# Patient Record
Sex: FEMALE | Race: WHITE
[De-identification: names, ages, dates, MRNs, and addresses within clinical notes are randomized per-mention and may not be internally consistent; named-entity substitution may affect disease eponyms.]

---

## 2017-02-12 ENCOUNTER — HOSPITAL ENCOUNTER (OUTPATIENT)
Dept: HOSPITAL 17 - NEPE | Age: 37
Setting detail: OBSERVATION
LOS: 1 days | Discharge: HOME | End: 2017-02-13
Attending: HOSPITALIST | Admitting: HOSPITALIST
Payer: COMMERCIAL

## 2017-02-12 VITALS
RESPIRATION RATE: 20 BRPM | HEART RATE: 95 BPM | DIASTOLIC BLOOD PRESSURE: 80 MMHG | SYSTOLIC BLOOD PRESSURE: 121 MMHG | TEMPERATURE: 98.8 F | OXYGEN SATURATION: 98 %

## 2017-02-12 VITALS — HEIGHT: 71 IN | WEIGHT: 149.91 LBS | BODY MASS INDEX: 20.99 KG/M2

## 2017-02-12 DIAGNOSIS — G43.909: ICD-10-CM

## 2017-02-12 DIAGNOSIS — K59.09: ICD-10-CM

## 2017-02-12 DIAGNOSIS — D72.829: ICD-10-CM

## 2017-02-12 DIAGNOSIS — T18.128A: Primary | ICD-10-CM

## 2017-02-12 DIAGNOSIS — K44.9: ICD-10-CM

## 2017-02-12 DIAGNOSIS — K21.9: ICD-10-CM

## 2017-02-12 DIAGNOSIS — K29.70: ICD-10-CM

## 2017-02-12 PROCEDURE — 88312 SPECIAL STAINS GROUP 1: CPT

## 2017-02-12 PROCEDURE — 43239 EGD BIOPSY SINGLE/MULTIPLE: CPT

## 2017-02-12 PROCEDURE — G0378 HOSPITAL OBSERVATION PER HR: HCPCS

## 2017-02-12 PROCEDURE — 83690 ASSAY OF LIPASE: CPT

## 2017-02-12 PROCEDURE — 43248 EGD GUIDE WIRE INSERTION: CPT

## 2017-02-12 PROCEDURE — 00740: CPT

## 2017-02-12 PROCEDURE — 96375 TX/PRO/DX INJ NEW DRUG ADDON: CPT

## 2017-02-12 PROCEDURE — 85025 COMPLETE CBC W/AUTO DIFF WBC: CPT

## 2017-02-12 PROCEDURE — 88305 TISSUE EXAM BY PATHOLOGIST: CPT

## 2017-02-12 PROCEDURE — 84703 CHORIONIC GONADOTROPIN ASSAY: CPT

## 2017-02-12 PROCEDURE — C9113 INJ PANTOPRAZOLE SODIUM, VIA: HCPCS

## 2017-02-12 PROCEDURE — 93005 ELECTROCARDIOGRAM TRACING: CPT

## 2017-02-12 PROCEDURE — 96374 THER/PROPH/DIAG INJ IV PUSH: CPT

## 2017-02-12 PROCEDURE — C1769 GUIDE WIRE: HCPCS

## 2017-02-12 PROCEDURE — 80053 COMPREHEN METABOLIC PANEL: CPT

## 2017-02-12 PROCEDURE — 99285 EMERGENCY DEPT VISIT HI MDM: CPT

## 2017-02-12 PROCEDURE — 71010: CPT

## 2017-02-12 NOTE — PD
HPI


Chief Complaint:  Foreign Body


Time Seen by Provider:  23:01


Travel History


International Travel<30 days:  No


Contact w/Intl Traveler<30days:  No


Traveled to known affect area:  No





History of Present Illness


HPI


The patient is a 37 year old female who presents to the [facility] 

emergency department with a history of reportedly having difficulty swallowing 

some starchy foods and meat over the last year.  She reports that she has had 

associated heartburn and reflux.  She reports that she has tried Prevacid 

without any relief.  She reports that over the last month her symptoms have 

greatly worsened.  She reports that today she went to [employer] and ate a sample 

of steak and asparagus and immediately began to choke on it.  She reports that 

she did not cough, however it felt like the food was stuck in her throat.  The 

patient reports that since then she has not been able to drink any fluids.  She 

reports that she cannot even swallow her own saliva.  She reports having 

central burning sensation in her chest.  The patient denies having any blood in 

her stool or black or tarry stools.  She reports that she last moved her bowels 

on Friday.  She reports having problems with chronic constipation.  She reports 

that she occasionally takes MiraLAX or senna.  The patient reports that she has 

a history of migraine headaches and usually will have a headache 3-4 times per 

week.  She reports that she takes BC powder 3-4 times per week for this.  She 

denies taking any other over-the-counter pain medication.  She reports that she 

drinks alcohol, usually 2 glasses of wine weekend.  The patient denies having a 

primary care physician.  She denies ever having endoscopy. The patient denies 

any recent fevers, cough, congestion, neck pain, shortness of breath, abdominal 

pain, vomiting, diarrhea, urinary symptoms, or neurologic symptoms. 


LMP:





[facility]


Past Medical History


*** Narrative Medical


The patient's past medical history is significant for ovarian cysts, history of

  headaches.


Medical History:  Denies Significant Hx


Diminished Hearing:  No


Pregnant?:  Not Pregnant


:  1


Miscarriage:  1


Ovarian Cysts:  Yes (CYSTS REMOVED )





Past Surgical History


*** Narrative Surgical


The patient's past surgical history is significant for 2 ovarian cysts being 

resected laparoscopically.


Gynecologic Surgery:  Yes (OVARIAN CYST   )





Social History


Alcohol Use:  Yes (WEEKENDS)


Tobacco Use:  No


Substance Use:  No (DENIES)





Allergies-Medications


(Allergen,Severity, Reaction):  


Coded Allergies:  


     No Known Allergies (Verified , 17)


Reported Meds & Prescriptions





Reported Meds & Active Scripts


Active


Pantoprazole (Pantoprazole Sodium) 40 Mg Tab 40 Mg PO DAILY 30 Days


Reported


Seasonique (Levonorgestrel-Ethinyl Estradiol) 0.15-0.03-0.01 Mg Tab 1 Tab PO 

DAILY





Narrative Medication


She reports taking BC powders for headaches 3-4 times per week.





Review of Systems


Except as stated in HPI:  all other systems reviewed are Neg


General / Constitutional:  No: Fever


Eyes:  No: Visual changes


HENT:  No: Headaches


Cardiovascular:  Positive: Chest Pain or Discomfort


Respiratory:  No: Cough, Shortness of Breath


Gastrointestinal:  Positive: Nausea, Constipation, Indigestion,  No: Vomiting, 

Diarrhea, Abdominal Pain, Changes in Bowel Habits, Loss of Appetite


Genitourinary:  No: Dysuria


Musculoskeletal:  No: Pain


Skin:  No Rash


Neurologic:  No: Weakness


Psychiatric:  No: Depression


Endocrine:  No: Polydipsia


Hematologic/Lymphatic:  No: Easy Bruising





Physical Exam


Narrative


General: 


The patient is well-developed well-nourished female who is uncomfortable 

appearing on examination, intermittently spitting saliva into a cup.





Head and Neck exam: 


Head is normocephalic atraumatic. 


Eyes: EOMI, pupils are equal round and reactive to light. 


Nose: Midline septum with pink mucous membranes 


Mouth: Dentition unremarkable. Moist mucus membranes. Posterior oropharynx is 

not erythematous. No tonsillar hypertrophy. Uvula midline. Airway patent. 


Neck: No palpable lymphadenopathy. No nuchal rigidity. No thyromegaly. 





Cardiovascular: 


Regular rate and rhythm without murmurs, gallops, or rubs. 





Lungs: 


Clear to auscultation bilaterally. No wheezes, rhonchi, or rales.


 


Abdomen:


Soft, without tenderness to palpation in all 4 quadrants of the abdomen. No 

guarding, rebound, or rigidity.  Normal bowel sounds are audible.  No 

tenderness on palpation of McBurney's point.





Extremities: 


No clubbing, cyanosis, or edema. 2+ pulses in all 4 extremities.  No calf 

tenderness on palpation.





Back: 


No spinous process tenderness to palpation. No costovertebral angle tenderness 

to palpation. 





Neurologic Exam: Grossly nonfocal.  Skin Exam: No rash noted. Intact skin that 

is warm and dry.





Data


Data


Last Documented VS





Vital Signs








  Date Time  Temp Pulse Resp B/P Pulse Ox O2 Delivery O2 Flow Rate FiO2


 


17 22:42 98.8 95 20 121/80 98 Room Air  








Orders





 Chest, Single Ap (17 23:13)


Iv Access Insert/Monitor (17 23:13)


Ecg Monitoring (17 23:13)


Oximetry (17 23:13)


Sodium Chlor 0.9% 1000 Ml Inj (Ns 1000 M (17 23:15)


Glucagon Inj (Glucagon Inj) (17 23:15)


Electrocardiogram (17 23:29)


Complete Blood Count With Diff (17 23:29)


Comprehensive Metabolic Panel (17 23:29)


Lipase (17 23:29)


Ed Urine Pregnancytest Poc (17 23:29)


Pantoprazole Inj (Protonix Inj) (17 23:30)


Admit Order (Ed Use Only) (17 01:01)





Labs





 Laboratory Tests








Test 17





 23:40


 


White Blood Count 14.5 TH/MM3


 


Red Blood Count 3.79 MIL/MM3


 


Hemoglobin 11.2 GM/DL


 


Hematocrit 32.5 %


 


Mean Corpuscular Volume 85.8 FL


 


Mean Corpuscular Hemoglobin 29.4 PG


 


Mean Corpuscular Hemoglobin 34.3 %





Concent 


 


Red Cell Distribution Width 13.3 %


 


Platelet Count 555 TH/MM3


 


Mean Platelet Volume 7.7 FL


 


Neutrophils (%) (Auto) 75.9 %


 


Lymphocytes (%) (Auto) 13.0 %


 


Monocytes (%) (Auto) 6.9 %


 


Eosinophils (%) (Auto) 3.7 %


 


Basophils (%) (Auto) 0.5 %


 


Neutrophils # (Auto) 11.0 TH/MM3


 


Lymphocytes # (Auto) 1.9 TH/MM3


 


Monocytes # (Auto) 1.0 TH/MM3


 


Eosinophils # (Auto) 0.5 TH/MM3


 


Basophils # (Auto) 0.1 TH/MM3


 


CBC Comment DIFF FINAL 


 


Differential Comment  


 


Sodium Level 141 MEQ/L


 


Potassium Level 3.7 MEQ/L


 


Chloride Level 106 MEQ/L


 


Carbon Dioxide Level 24.5 MEQ/L


 


Anion Gap 11 MEQ/L


 


Blood Urea Nitrogen 14 MG/DL


 


Creatinine 0.72 MG/DL


 


Estimat Glomerular Filtration 91 ML/MIN





Rate 


 


Random Glucose 82 MG/DL


 


Calcium Level 8.7 MG/DL


 


Total Bilirubin 0.2 MG/DL


 


Aspartate Amino Transf 10 U/L





(AST/SGOT) 


 


Alanine Aminotransferase 15 U/L





(ALT/SGPT) 


 


Alkaline Phosphatase 92 U/L


 


Total Protein 8.1 GM/DL


 


Albumin 3.3 GM/DL


 


Lipase 81 U/L











MDM


Medical Decision Making


Medical Screen Exam Complete:  Yes


Emergency Medical Condition:  Yes


Medical Record Reviewed:  Yes


Interpretation(s)





Laboratory Tests








Test 17





 23:40


 


White Blood Count 14.5 TH/MM3





 (4.0-11.0)


 


Red Blood Count 3.79 MIL/MM3





 (4.00-5.30)


 


Hemoglobin 11.2 GM/DL





 (11.6-15.3)


 


Hematocrit 32.5 %





 (35.0-46.0)


 


Platelet Count 555 TH/MM3





 (150-450)


 


Neutrophils (%) (Auto) 75.9 %





 (16.0-70.0)


 


Neutrophils # (Auto) 11.0 TH/MM3





 (1.8-7.7)


 


Monocytes # (Auto) 1.0 TH/MM3





 (0-0.9)


 


Eosinophils # (Auto) 0.5 TH/MM3





 (0-0.4)


 


Aspartate Amino Transf 10 U/L (15-37)





(AST/SGOT) 


 


Albumin 3.3 GM/DL





 (3.4-5.0)








Last Impressions








Chest X-Ray 17 [id number] Signed





Impressions: 





 Service Date/Time:  2017 23:21 - CONCLUSION:  1. No 

foreign 





 body is identified. 2. Possible nodule right base     Salazar You MD 








Differential Diagnosis


Acid reflux with stricture formation, versus esophageal food bolus


Narrative Course


During the course of the patients emergency department visit, the patients 

history, examination, and differential diagnosis were reviewed with the 

patient. The patient had  IV access obtained and blood work sent for analysis.  

The patient was placed on a cardiac monitor with oximetry and blood pressure 

monitoring.  An EKG has been ordered.  A chest x-ray has been ordered.





The patient was provided Protonix 40 mg IV, normal saline 1 L IV fluid bolus, 

glucagon 0.5 mg IV.





The patients laboratory studies were reviewed and remarkable for a white count 

of 14.5, hemoglobin 11.2, platelets 555, neutrophils 75.9, CMP is unremarkable.

  Lipase 81





 Radiology studies were reviewed and remarkable for a chest x-ray that shows no 

foreign body, possible nodule right lung base.





The patient was reexamined and reported some improvement after the glucagon, 

however she was given a trial of by mouth hydration and again was not able to 

tolerate by mouth and vomited and continued to spit out her saliva which she 

was not able to swallow.  A call was placed out to the gastroenterologist on 

call.  He reports that he will not be able to do her endoscopy until the 

morning.  The patient will be admitted for observation for pain control, IV 

fluids.  





The patients results were discussed with the patient, including the plan of 

care. I explained that further testing and/ or monitoring is indicated based on 

the patients history, examination, and/ or laboratory findings. Therefore, I 

recommended admission for additional evaluation. The patient expressed 

understanding and was agreeable with this plan. The patient was admitted to the 

hospital in stable condition and [facility]





Physician Communication


Physician Communication


I spoke to Dr. Long at 12:45 AM.  He requested that the patient be observed 

and nothing by mouth as he plans to take the patient for endoscopy in the 

morning.  A [facility] was then [facility] 

[facility].  The patient's case is discussed with Dr. Travis who did 

agree to admit the patient for further evaluation and treatment at this time.





Diagnosis





 Primary Impression:  


 Intractable vomiting


 Qualified Code:  R11.11 - Intractable vomiting without nausea, unspecified 

vomiting type


 Additional Impression:  


 Sensation of foreign body in esophagus





Admitting Information


Admitting Physician Requests:  Observation


Scripts


Pantoprazole 40 Mg Tab40 Mg PO DAILY  30 Days


   Prov:Estela Dyer DO         17








Neva Rowland MD 2017 23:37

## 2017-02-13 VITALS — TEMPERATURE: 98.4 F

## 2017-02-13 VITALS
OXYGEN SATURATION: 99 % | TEMPERATURE: 97.9 F | HEART RATE: 89 BPM | RESPIRATION RATE: 18 BRPM | DIASTOLIC BLOOD PRESSURE: 68 MMHG | SYSTOLIC BLOOD PRESSURE: 104 MMHG

## 2017-02-13 VITALS
OXYGEN SATURATION: 96 % | HEART RATE: 84 BPM | SYSTOLIC BLOOD PRESSURE: 113 MMHG | RESPIRATION RATE: 16 BRPM | DIASTOLIC BLOOD PRESSURE: 2 MMHG

## 2017-02-13 VITALS
RESPIRATION RATE: 18 BRPM | HEART RATE: 83 BPM | OXYGEN SATURATION: 98 % | DIASTOLIC BLOOD PRESSURE: 55 MMHG | TEMPERATURE: 99 F | SYSTOLIC BLOOD PRESSURE: 110 MMHG

## 2017-02-13 VITALS
OXYGEN SATURATION: 99 % | DIASTOLIC BLOOD PRESSURE: 68 MMHG | HEART RATE: 89 BPM | TEMPERATURE: 97.9 F | SYSTOLIC BLOOD PRESSURE: 104 MMHG | RESPIRATION RATE: 18 BRPM

## 2017-02-13 LAB
ALP SERPL-CCNC: 92 U/L (ref 45–117)
ALT SERPL-CCNC: 15 U/L (ref 10–53)
ANION GAP SERPL CALC-SCNC: 11 MEQ/L (ref 5–15)
AST SERPL-CCNC: 10 U/L (ref 15–37)
BASOPHILS # BLD AUTO: 0.1 TH/MM3 (ref 0–0.2)
BASOPHILS NFR BLD: 0.5 % (ref 0–2)
BILIRUB SERPL-MCNC: 0.2 MG/DL (ref 0.2–1)
BUN SERPL-MCNC: 14 MG/DL (ref 7–18)
CHLORIDE SERPL-SCNC: 106 MEQ/L (ref 98–107)
EOSINOPHIL # BLD: 0.5 TH/MM3 (ref 0–0.4)
EOSINOPHIL NFR BLD: 3.7 % (ref 0–4)
ERYTHROCYTE [DISTWIDTH] IN BLOOD BY AUTOMATED COUNT: 13.3 % (ref 11.6–17.2)
GFR SERPLBLD BASED ON 1.73 SQ M-ARVRAT: 91 ML/MIN (ref 89–?)
HCO3 BLD-SCNC: 24.5 MEQ/L (ref 21–32)
HCT VFR BLD CALC: 32.5 % (ref 35–46)
HEMO FLAGS: (no result)
LYMPHOCYTES # BLD AUTO: 1.9 TH/MM3 (ref 1–4.8)
LYMPHOCYTES NFR BLD AUTO: 13 % (ref 9–44)
MCH RBC QN AUTO: 29.4 PG (ref 27–34)
MCHC RBC AUTO-ENTMCNC: 34.3 % (ref 32–36)
MCV RBC AUTO: 85.8 FL (ref 80–100)
MONOCYTES NFR BLD: 6.9 % (ref 0–8)
NEUTROPHILS # BLD AUTO: 11 TH/MM3 (ref 1.8–7.7)
NEUTROPHILS NFR BLD AUTO: 75.9 % (ref 16–70)
PLATELET # BLD: 555 TH/MM3 (ref 150–450)
POTASSIUM SERPL-SCNC: 3.7 MEQ/L (ref 3.5–5.1)
RBC # BLD AUTO: 3.79 MIL/MM3 (ref 4–5.3)
SODIUM SERPL-SCNC: 141 MEQ/L (ref 136–145)
WBC # BLD AUTO: 14.5 TH/MM3 (ref 4–11)

## 2017-02-13 RX ADMIN — MORPHINE SULFATE PRN MG: 2 INJECTION, SOLUTION INTRAMUSCULAR; INTRAVENOUS at 09:50

## 2017-02-13 RX ADMIN — MORPHINE SULFATE PRN MG: 2 INJECTION, SOLUTION INTRAMUSCULAR; INTRAVENOUS at 03:16

## 2017-02-13 RX ADMIN — MORPHINE SULFATE PRN MG: 2 INJECTION, SOLUTION INTRAMUSCULAR; INTRAVENOUS at 06:24

## 2017-02-13 RX ADMIN — MORPHINE SULFATE PRN MG: 2 INJECTION, SOLUTION INTRAMUSCULAR; INTRAVENOUS at 13:21

## 2017-02-13 NOTE — RADRPT
EXAM DATE/TIME:  02/12/2017 23:21 

 

[facility] COMPARISON:     

No previous studies available for comparison.

 

                     

INDICATIONS :     

Foreign body.

                     

 

MEDICAL HISTORY :     

None.          

 

SURGICAL HISTORY :     

None.   

 

ENCOUNTER:     

Initial                                        

 

ACUITY:     

1 day      

 

PAIN SCORE:     

0/10

 

LOCATION:     

Bilateral chest 

 

FINDINGS:     

The cardiac silhouette is normal in transverse diameter. There is no  evidence of pneumonia. No forei
gn body is identified. There is a questionable nodule in the right base. The maximal diameter of this
 finding is 15 mm. CT scan is recommended for further evaluation if clinically indicated. This can be
 performed on an elective basis.

 

CONCLUSION:     

1. No foreign body is identified.

2. Possible nodule right base

 

 

 

 Salazar You MD on February 12, 2017 at 23:59           

Board Certified Radiologist.

 This report was verified electronically.

## 2017-02-13 NOTE — EKG
Date Performed: 02/12/2017       Time Performed: 23:49:56

 

PTAGE:      37 years

 

EKG:      Sinus rhythm 

 

 NORMAL ECG 

 

NO PREVIOUS TRACING            

 

DOCTOR:   Salazar Raza  Interpretating Date/Time  02/13/2017 09:36:23

## 2017-02-13 NOTE — HHI.PR
Subjective


Remarks


Follow up for food impaction. Ms. Lopez underwent EGD study today. She is 

complaining of some discomfort but able to eat small amount. No fever, chills. 

GI cleared for discharge.





Objective


Vitals





 Vital Signs








  Date Time  Temp Pulse Resp B/P Pulse Ox O2 Delivery O2 Flow Rate FiO2


 


2/13/17 11:45  84 16 113/2 96 Room Air  


 


2/13/17 11:35 98.4 98 16 117/75 97 Room Air  


 


2/13/17 09:35 97.9 89 18 104/68 99   


 


2/13/17 08:00 99.0 83 18 110/55 98   


 


2/13/17 03:06 97.9 89 18 104/68 99   


 


2/12/17 22:42 98.8 95 20 121/80 98 Room Air  








 I/O








 2/12/17 2/12/17 2/12/17 2/13/17 2/13/17 2/13/17





 07:00 15:00 23:00 07:00 15:00 23:00


 


Intake Total    300 ml 10 ml 


 


Balance    300 ml 10 ml 


 


      


 


Intake IV Total    300 ml 10 ml 








Result Diagram:  


2/12/17 2340                                                                   

             2/12/17 2340





Imaging





Last Impressions








Chest X-Ray 2/12/17 2313 Signed





Impressions: 





 Service Date/Time:  Sunday, February 12, 2017 23:21 - CONCLUSION:  1. No 

foreign 





 body is identified. 2. Possible nodule right base     Salazar You MD 








Objective Remarks


GENERAL:  AOX3, NAD. 


SKIN: Warm and dry.


HEAD: Normocephalic.


EYES: No scleral icterus. No injection or drainage. 


NECK: Supple, trachea midline. No JVD or lymphadenopathy.


CARDIOVASCULAR: Regular rate and rhythm without murmurs, gallops, or rubs. 


RESPIRATORY: Breath sounds equal bilaterally. No accessory muscle use.


GASTROINTESTINAL: Abdomen soft, non-tender, nondistended. 


MUSCULOSKELETAL: No cyanosis, or edema. 


BACK: Nontender without obvious deformity. No CVA tenderness.





Procedures


EGD 2/13/2017.





A/P


Problem List:  


(1) Food impaction of esophagus


ICD Code:  T18.128A


Status:  Acute


(2) Intractable vomiting


ICD Code:  R11.10


Status:  Acute


(3) Leukocytosis


ICD Code:  D72.829


Status:  Acute


Assessment and Plan


Ms. Lopez is a 37-year-old female with a PMH of Migraines who presented to 

the ER with complaints of sensation of foreign body in esophagus after eating.  

EGD was done with dilation and disimpaction. 





- Esophageal food impaction


   - s/p EGD.    


   - GI recommends PPI, avoid NSAIDs. 


   - Retroflexed hiatal hernia noted as well as gastritis with superficial 

ulceration antrum. 





Full code. Ambulation. 





Discharge patient to home


Condition on discharge: Improved


Regular Diet as tolerated


Ad Cherrie activity


Rx written:


- Protonix 40mg Qday. 





Follow-up with GI within two weeks.








Estela Dyer DO Feb 13, 2017 2:45 pm

## 2017-02-13 NOTE — PD.CONS
HPI


History of Present Illness


This is a 37 year old female who came to the ER for evaluation after getting a 

piece of meat caught in her esophagus.  She reports that she has been having 

issues with dry solids occasionally getting caught in her mid esophagus for 

about a year.  This seems to have been gradually worsening, although it 

suddenly became much worse about a week ago.  She was at [employer] and had a 

sample of steak and asparagus and reports that it became lodged and that she 

could not get this to go down and that she could not even pass any water.  She 

denies any heartburn or reflux prior to this, but did have some after the food 

became lodged.  She reports that her appetite has been good up until this point 

and she has not had any other GI issues.  Of note, she does get migraines and 

she takes BC ASA about 3-4 times a week.   (Mary Grace Odom)





PFSH


Past Medical History


Migraines


Past Surgical History


Surgery for Ovarian Cyst


LEEP procedure (Mary Grace Odom)


Coded Allergies:  


     No Known Allergies (Verified , 2/12/17)


Medications





 Allergies








Coded Allergies Type Severity Reaction Last Updated Verified


 


  No Known Allergies    2/12/17 Yes








 Active Scripts








 Medications  Dose


 Route/Sig Days Date Category


 


 Seasonique


  (Levonorgestrel-Ethinyl


 Estradiol)


 0.15-0.03-0.01 Mg


 Tab  1 Tab


 PO DAILY   2/13/17 Reported








Family History


Maternal grandmother has DM


Social History


Occasional alcohol.  Negative for tobacco or drugs. (Mary Grace Odom)





Review of Systems


Constitutional:  DENIES: Fatigue, Weight loss


Respiratory:  DENIES: Cough


Cardiovascular:  DENIES: Chest pain


Gastrointestinal:  COMPLAINS OF: Difficulty Swallowing, Odynophagia, Heartburn,

  DENIES: Abdominal pain, Black stools, Bloody stools, Constipation, Diarrhea, 

Nausea, Vomiting, Anorexia


Musculoskeletal:  DENIES: Joint pain


Integumentary:  DENIES: Abnormal pigmentation


Hematologic/lymphatic:  DENIES: Bruising


Neurologic:  COMPLAINS OF: Headache


Psychiatric:  DENIES: Confusion (Mary Grace Odom)





GI Exam


Vitals I&O





 Vital Signs








  Date Time  Temp Pulse Resp B/P Pulse Ox O2 Delivery O2 Flow Rate FiO2


 


2/13/17 11:45  84 16 113/2 96 Room Air  


 


2/13/17 11:35 98.4 98 16 117/75 97 Room Air  


 


2/13/17 09:35 97.9 89 18 104/68 99   


 


2/13/17 08:00 99.0 83 18 110/55 98   


 


2/13/17 03:06 97.9 89 18 104/68 99   


 


2/12/17 22:42 98.8 95 20 121/80 98 Room Air  








 I/O








 2/12/17 2/12/17 2/12/17 2/13/17 2/13/17 2/13/17





 07:00 15:00 23:00 07:00 15:00 23:00


 


Intake Total    300 ml 10 ml 


 


Balance    300 ml 10 ml 


 


      


 


Intake IV Total    300 ml 10 ml 








Imaging





Last Impressions








Chest X-Ray 2/12/17 8613 Signed





Impressions: 





 Service Date/Time:  Sunday, February 12, 2017 23:21 - CONCLUSION:  1. No 

foreign 





 body is identified. 2. Possible nodule right base     Salazar You MD 








Laboratory











Test 2/12/17





 23:40


 


White Blood Count 14.5 TH/MM3


 


Red Blood Count 3.79 MIL/MM3


 


Hemoglobin 11.2 GM/DL


 


Hematocrit 32.5 %


 


Mean Corpuscular Volume 85.8 FL


 


Mean Corpuscular Hemoglobin 29.4 PG


 


Mean Corpuscular Hemoglobin 34.3 %





Concent 


 


Red Cell Distribution Width 13.3 %


 


Platelet Count 555 TH/MM3


 


Mean Platelet Volume 7.7 FL


 


Neutrophils (%) (Auto) 75.9 %


 


Lymphocytes (%) (Auto) 13.0 %


 


Monocytes (%) (Auto) 6.9 %


 


Eosinophils (%) (Auto) 3.7 %


 


Basophils (%) (Auto) 0.5 %


 


Neutrophils # (Auto) 11.0 TH/MM3


 


Lymphocytes # (Auto) 1.9 TH/MM3


 


Monocytes # (Auto) 1.0 TH/MM3


 


Eosinophils # (Auto) 0.5 TH/MM3


 


Basophils # (Auto) 0.1 TH/MM3


 


CBC Comment DIFF FINAL 


 


Differential Comment  


 


Sodium Level 141 MEQ/L


 


Potassium Level 3.7 MEQ/L


 


Chloride Level 106 MEQ/L


 


Carbon Dioxide Level 24.5 MEQ/L


 


Anion Gap 11 MEQ/L


 


Blood Urea Nitrogen 14 MG/DL


 


Creatinine 0.72 MG/DL


 


Estimat Glomerular Filtration 91 ML/MIN





Rate 


 


Random Glucose 82 MG/DL


 


Calcium Level 8.7 MG/DL


 


Total Bilirubin 0.2 MG/DL


 


Aspartate Amino Transf 10 U/L





(AST/SGOT) 


 


Alanine Aminotransferase 15 U/L





(ALT/SGPT) 


 


Alkaline Phosphatase 92 U/L


 


Total Protein 8.1 GM/DL


 


Albumin 3.3 GM/DL


 


Lipase 81 U/L








Physical Examination


HEENT: Normocephalic; atraumatic; no jaundice.  Throat is clear.


NECK: Neck is supple, no JVD, no lymphadenopathy.


CHEST:  CTA


CARDIAC:  Regular rate and rhythm with no murmur gallop or rubs.


ABDOMEN:  Soft, nondistended, nontender; no hepatosplenomegaly; bowel sounds 

are present in all four quadrants.


EXTREMITIES: No clubbing, cyanosis, or edema.


SKIN:  Normal; no rash; no jaundice.


CNS:  No focal deficits; alert and oriented times three. (Mary Grace Odom)





Assessment and Plan


Plan


ASSESSMENT:


- Foreign Body in esophagus, Dysphagia, Odynophagia. Intermittent dysphagia 

with dry foods getting caught x 1 year, worse over the past week.  Had a piece 

of steak and asparagus


   get lodged in esophagus yesterday and was not able to get this down or pass 

liquids.  Of note, she takes BC ASA about 3-4 times a week for migraines.





PLAN:


- Plan for egd +/- dilatation today


- Obtain consents


- NPO


- PPI


- Supportive care


- Further recommendations to follow based on results of above


- Pt seen and examined by Dr. Thompson and myself and this note is written on her 

behalf (Mary Grace Odom)


Physician Comments


seen, examined


agree with above  (Sugey Thompson MD)








Mary Grace Odom Feb 13, 2017 12:29


Sugey Thompson MD Feb 13, 2017 18:47

## 2017-12-27 ENCOUNTER — HOSPITAL ENCOUNTER (EMERGENCY)
Dept: HOSPITAL 17 - NEPD | Age: 37
Discharge: HOME | End: 2017-12-27
Payer: SELF-PAY

## 2017-12-27 VITALS
TEMPERATURE: 97.5 F | DIASTOLIC BLOOD PRESSURE: 89 MMHG | HEART RATE: 99 BPM | RESPIRATION RATE: 18 BRPM | SYSTOLIC BLOOD PRESSURE: 129 MMHG | OXYGEN SATURATION: 100 %

## 2017-12-27 DIAGNOSIS — J18.1: Primary | ICD-10-CM

## 2017-12-27 PROCEDURE — 71010: CPT

## 2017-12-27 PROCEDURE — 99284 EMERGENCY DEPT VISIT MOD MDM: CPT

## 2017-12-27 NOTE — RADRPT
EXAM DATE/TIME:  12/27/2017 12:08 

 

[facility] COMPARISON:     

CHEST SINGLE AP, February 12, 2017, 23:21.

 

                     

INDICATIONS :     

Cough.

                     

 

MEDICAL HISTORY :     

None.          

 

SURGICAL HISTORY :     

None.   

 

ENCOUNTER:     

Initial                                        

 

ACUITY:     

3 months      

 

PAIN SCORE:     

0/10

 

LOCATION:     

Bilateral chest 

 

FINDINGS:     

A single view of the chest demonstrates a mild infiltrate in the right middle lobe. Otherwise, the re
st of lung fields are grossly clear. There are no pleural effusions or pulmonary edema. The heart and
 hilar structures are within normal limits. The bony structures are intact and stable..

 

CONCLUSION:     

Mild infiltrate in the right middle lobe.

 

 

 

 Enrico Russell MD on December 27, 2017 at 12:30           

Board Certified Radiologist.

 This report was verified electronically.

## 2017-12-27 NOTE — PD
HPI


Chief Complaint:  Cold / Flu Symptoms


Time Seen by Provider:  11:24


Travel History


International Travel<30 days:  No


Contact w/Intl Traveler<30days:  No


Traveled to known affect area:  No





History of Present Illness


HPI


37-year-old female presents to the emergency Department with complaint of nasal 

congestion, cough, bilateral ear pain, chest congestion for the last 3-4 weeks 

with worsening the past few days.  Reports subjective fevers and chills.  

Denies throat pain.  Reports shortness of breath, chest tightness, wheezing.  

Denies chest pain.  Report body aches.  Denies vomiting, abdominal pain.  Been 

taking Anitha[place] for symptom management.  If symptoms are mild in severity.  

He does not have an established primary care provider.  Denies significant past 

medical history.  No known allergies.  Has no other medical complaints.  No 

other modifying factors or associated signs and symptoms.





PFSH


Past Medical History


Anxiety:  Yes


Depression:  Yes


Cancer:  No


Cardiovascular Problems:  No


Chemotherapy:  No


Diminished Hearing:  No


Endocrine:  No


Genitourinary:  No


Immune Disorder:  No


Musculoskeletal:  No


Neurologic:  No


Psychiatric:  Yes


Reproductive:  No


Respiratory:  No


Radiation Therapy:  No


Pregnant?:  Not Pregnant


:  1


Miscarriage:  1


Ovarian Cysts:  Yes (CYSTS REMOVED )





Past Surgical History


Gynecologic Surgery:  Yes (OVARIAN CYST   )





Social History


Alcohol Use:  Yes (WEEKENDS)


Tobacco Use:  No


Substance Use:  No (DENIES)





Allergies-Medications


(Allergen,Severity, Reaction):  


Coded Allergies:  


     No Known Allergies (Verified  Adverse Reaction, Unknown, 17)


Reported Meds & Prescriptions





Reported Meds & Active Scripts


Active


Deltasone (Prednisone) 20 Mg Tab 20 Mg PO BID 5 Days


Ventolin Hfa 18 GM Inh (Albuterol Sulfate) 90 Mcg/Act Aer 2 Puff INH Q4-6H PRN


Tessalon Perles (Benzonatate) 100 Mg Cap 100 Mg PO TID PRN 3 Days


Azithromycin 500 Mg Tab 500 Mg PO DAILY 5 Days


Reported


Prevacid (Lansoprazole) 15 Mg Capdr 15 Mg PO DAILY








Review of Systems


Except as stated in HPI:  all other systems reviewed are Neg





Physical Exam


Narrative


GENERAL: Well-nourished, well-developed  female patient, in no acute 

distress; afebrile, nontoxic-appearing


SKIN: Warm and dry.


HEAD: Atraumatic. Normocephalic. 


EYES: Pupils equal and round. No scleral icterus. No injection or drainage. 


ENT: Mucosa pink and moist. No erythema or exudates. No uvular edema. No uvular

, palatal, or tonsillar deviation.  Airway patent.  Nares without nasal blood, 

purulent drainage.


EARS: Bilateral pinnae and external canals appear within normal limits. 

Bilateral tympanic membranes without erythema, dullness or perforation.


NECK: Trachea midline.  No lymphadenopathy. 


CARDIOVASCULAR: Regular rate and rhythm.  No murmur appreciated.


RESPIRATORY: No accessory muscle use.  Lungs with mild Wheezing in bilateral 

bases to auscultation. Breath sounds equal bilaterally.  No retractions or 

tachypnea.  No Audible wheezing noted.  Persistent dry cough.


GASTROINTESTINAL: Abdomen soft, non-tender, nondistended. Hepatic and splenic 

margins not palpable.  Bowel sounds are active 4 quadrants.  


MUSCULOSKELETAL: No obvious deformities. No clubbing.  No cyanosis.  No edema. 


NEUROLOGICAL: Awake and alert.  Oriented 3.  No obvious cranial nerve 

deficits.  Motor grossly within normal limits. Normal speech. Moves all 

extremities.  5/5 strength to all extremities.


PSYCHIATRIC: Appropriate mood and affect; insight and judgment normal.





Data


Data


Last Documented VS





Vital Signs








  Date Time  Temp Pulse Resp B/P (MAP) Pulse Ox O2 Delivery O2 Flow Rate FiO2


 


17 11:30      Room Air  


 


17 11:13 97.5 99 18 129/89 (102) 100   








Orders





 Orders


Chest, Single Ap (17 11:32)


Prednisone (Deltasone) (17 11:45)


Albuterol Neb (Albuterol Neb) (17 11:45)


Influenzae A/B Antigen (17 11:37)


Ed Discharge Order (17 12:41)








[facility]


Medical Decision Making


Medical Screen Exam Complete:  Yes


Emergency Medical Condition:  Yes


Medical Record Reviewed:  Yes


Differential Diagnosis


Bronchitis, influenza, pneumonia, viral illness


Narrative Course


37-year-old female with cough/cold/flu symptoms.  Patient is afebrile and 

nontoxic-appearing.  Reports subjective fevers.  Patient with wheezing in 

bilateral bases.  No acute distress.  No retractions or tachypnea.  Persistent 

dry cough.  Prednisone, albuterol nebulizer, chest x-ray, influenza ordered.


1235:  Chest x-ray concludes:  Mild infiltrate in the right middle lobe.  On 

reexamination the patient reports improvement in chest tightness and shortness 

of breath.  Lung sounds are clear and equal throughout.  Azithromycin, Ventolin 

inhaler, prednisone, Tessalon Perles prescribed for home.  Instructed patient 

to follow up with primary care provider.  Patient verbalizes understanding and 

agreement with treatment plan.  Patient is medically cleared and stable for 

discharge.  Discussed reasons to return to the emergency department.  Patient 

agrees with treatment plan.  The patients vital signs are stable and the 

patient is stable for outpatient follow-up and treatment.  Patient discharged 

home, stable and in no acute distress.





Diagnosis





 Primary Impression:  


 Pneumonia


 Qualified Codes:  J18.1 - Lobar pneumonia, unspecified organism


Referrals:  


[facility]





Primary Care Physician


Patient Instructions:  Community Acquired Pneumonia (ED), General Instructions, 

Safe Use of Cough and Cold Medicines (ED)


Departure Forms:  Tests/Procedures, Work Release   Enter return to work date:  

Dec 29, 2017





***Additional Instructions:  


Ibuprofen or Tylenol as directed and as needed to reduce fever; may alternate 

ibuprofen and Tylenol as needed every 3 hours to minimize fever


Over-the-counter cold/flu medications as directed and as needed for symptom 

management


Get plenty of sleep/rest


Drink plenty of fluids to prevent dehydration; such as Gatorade, Powerade, 

Pedialyte


[place] diet to encourage nutrition such as crackers, fruit, applesauce, toast, 

soup etc.


Use an air humidifier/turn off ceiling fans


Use albuterol inhaler as needed for shortness of breath and/or wheezing


Take oral steroids as prescribed and complete full course


Follow-up with your primary care provider within 1 day; follow up for repeat 

chest x-ray in 7-10 days to verify resolution


Return immediately to the emergency department with worsening of symptoms


***Med/Other Pt SpecificInfo:  Prescription(s) given


Scripts


Prednisone (Deltasone) 20 Mg Tab


20 MG PO BID for 5 Days, #10 TAB 0 Refills


   Prov: Maryann Regalado ARNP         17 


Albuterol 18 GM Inh (Ventolin Hfa 18 GM Inh) 90 Mcg/Act Aer


2 PUFF INH Q4-6H Y for SOB/WHEEZING, #1 INHALER 0 Refills


   Prov: Maryann Regalado ARNP         17 


Benzonatate (Tessalon Perles) 100 Mg Cap


100 MG PO TID Y for COUGH for 3 Days, CAP 0 Refills


   Prov: Maryann Regalado         17 


Azithromycin (Azithromycin) 500 Mg Tab


500 MG PO DAILY for Infection for 5 Days, #5 TAB 0 Refills


   Prov: Maryann Regalado         17


Disposition:  01 DISCHARGE HOME


Condition:  Stable











Maryann Regalado Dec 27, 2017 11:37

## 2018-03-07 ENCOUNTER — HOSPITAL ENCOUNTER (OUTPATIENT)
Dept: HOSPITAL 17 - HRAD | Age: 38
Discharge: HOME | End: 2018-03-07
Attending: FAMILY MEDICINE
Payer: COMMERCIAL

## 2018-03-07 ENCOUNTER — HOSPITAL ENCOUNTER (INPATIENT)
Dept: HOSPITAL 17 - NEPE | Age: 38
LOS: 12 days | Discharge: HOME | DRG: 842 | End: 2018-03-19
Attending: HOSPITALIST | Admitting: HOSPITALIST
Payer: COMMERCIAL

## 2018-03-07 VITALS
SYSTOLIC BLOOD PRESSURE: 91 MMHG | RESPIRATION RATE: 16 BRPM | OXYGEN SATURATION: 97 % | DIASTOLIC BLOOD PRESSURE: 54 MMHG | TEMPERATURE: 98.5 F | HEART RATE: 94 BPM

## 2018-03-07 VITALS
RESPIRATION RATE: 18 BRPM | TEMPERATURE: 98.2 F | HEART RATE: 103 BPM | SYSTOLIC BLOOD PRESSURE: 97 MMHG | OXYGEN SATURATION: 97 % | DIASTOLIC BLOOD PRESSURE: 52 MMHG

## 2018-03-07 VITALS
OXYGEN SATURATION: 97 % | DIASTOLIC BLOOD PRESSURE: 56 MMHG | HEART RATE: 107 BPM | SYSTOLIC BLOOD PRESSURE: 92 MMHG | RESPIRATION RATE: 17 BRPM

## 2018-03-07 VITALS
RESPIRATION RATE: 20 BRPM | HEART RATE: 86 BPM | DIASTOLIC BLOOD PRESSURE: 46 MMHG | OXYGEN SATURATION: 99 % | SYSTOLIC BLOOD PRESSURE: 78 MMHG | TEMPERATURE: 97.8 F

## 2018-03-07 VITALS
DIASTOLIC BLOOD PRESSURE: 53 MMHG | SYSTOLIC BLOOD PRESSURE: 116 MMHG | OXYGEN SATURATION: 98 % | RESPIRATION RATE: 22 BRPM | HEART RATE: 82 BPM

## 2018-03-07 VITALS
OXYGEN SATURATION: 100 % | RESPIRATION RATE: 18 BRPM | HEART RATE: 90 BPM | DIASTOLIC BLOOD PRESSURE: 54 MMHG | SYSTOLIC BLOOD PRESSURE: 90 MMHG | TEMPERATURE: 98.2 F

## 2018-03-07 VITALS
RESPIRATION RATE: 16 BRPM | SYSTOLIC BLOOD PRESSURE: 93 MMHG | TEMPERATURE: 97.3 F | OXYGEN SATURATION: 97 % | DIASTOLIC BLOOD PRESSURE: 53 MMHG | HEART RATE: 94 BPM

## 2018-03-07 VITALS
TEMPERATURE: 98.7 F | SYSTOLIC BLOOD PRESSURE: 104 MMHG | DIASTOLIC BLOOD PRESSURE: 62 MMHG | HEART RATE: 100 BPM | RESPIRATION RATE: 16 BRPM | OXYGEN SATURATION: 99 %

## 2018-03-07 VITALS
HEART RATE: 91 BPM | DIASTOLIC BLOOD PRESSURE: 52 MMHG | SYSTOLIC BLOOD PRESSURE: 95 MMHG | OXYGEN SATURATION: 99 % | RESPIRATION RATE: 16 BRPM

## 2018-03-07 VITALS
SYSTOLIC BLOOD PRESSURE: 104 MMHG | OXYGEN SATURATION: 98 % | DIASTOLIC BLOOD PRESSURE: 66 MMHG | HEART RATE: 103 BPM | RESPIRATION RATE: 22 BRPM

## 2018-03-07 VITALS
HEART RATE: 103 BPM | DIASTOLIC BLOOD PRESSURE: 62 MMHG | RESPIRATION RATE: 18 BRPM | OXYGEN SATURATION: 98 % | SYSTOLIC BLOOD PRESSURE: 96 MMHG | TEMPERATURE: 98.8 F

## 2018-03-07 VITALS — OXYGEN SATURATION: 98 %

## 2018-03-07 VITALS — HEIGHT: 71 IN | BODY MASS INDEX: 18.33 KG/M2 | WEIGHT: 130.95 LBS

## 2018-03-07 VITALS
OXYGEN SATURATION: 98 % | SYSTOLIC BLOOD PRESSURE: 83 MMHG | RESPIRATION RATE: 20 BRPM | HEART RATE: 122 BPM | DIASTOLIC BLOOD PRESSURE: 55 MMHG

## 2018-03-07 VITALS
DIASTOLIC BLOOD PRESSURE: 69 MMHG | RESPIRATION RATE: 20 BRPM | HEART RATE: 106 BPM | OXYGEN SATURATION: 98 % | SYSTOLIC BLOOD PRESSURE: 95 MMHG

## 2018-03-07 VITALS — BODY MASS INDEX: 17.96 KG/M2 | WEIGHT: 128.31 LBS | HEIGHT: 71 IN

## 2018-03-07 DIAGNOSIS — K21.0: ICD-10-CM

## 2018-03-07 DIAGNOSIS — Z80.0: ICD-10-CM

## 2018-03-07 DIAGNOSIS — G47.00: ICD-10-CM

## 2018-03-07 DIAGNOSIS — L29.9: ICD-10-CM

## 2018-03-07 DIAGNOSIS — F32.9: ICD-10-CM

## 2018-03-07 DIAGNOSIS — R19.5: ICD-10-CM

## 2018-03-07 DIAGNOSIS — R50.9: ICD-10-CM

## 2018-03-07 DIAGNOSIS — Z87.891: ICD-10-CM

## 2018-03-07 DIAGNOSIS — M54.9: ICD-10-CM

## 2018-03-07 DIAGNOSIS — R59.0: ICD-10-CM

## 2018-03-07 DIAGNOSIS — F06.4: ICD-10-CM

## 2018-03-07 DIAGNOSIS — C81.29: ICD-10-CM

## 2018-03-07 DIAGNOSIS — R16.0: Primary | ICD-10-CM

## 2018-03-07 DIAGNOSIS — R63.4: ICD-10-CM

## 2018-03-07 DIAGNOSIS — R16.0: ICD-10-CM

## 2018-03-07 DIAGNOSIS — R61: ICD-10-CM

## 2018-03-07 DIAGNOSIS — K59.09: ICD-10-CM

## 2018-03-07 DIAGNOSIS — F14.90: ICD-10-CM

## 2018-03-07 DIAGNOSIS — C81.28: Primary | ICD-10-CM

## 2018-03-07 DIAGNOSIS — D50.9: ICD-10-CM

## 2018-03-07 LAB
ALBUMIN SERPL-MCNC: 2.8 GM/DL (ref 3.4–5)
ALP SERPL-CCNC: 220 U/L (ref 45–117)
ALT SERPL-CCNC: 26 U/L (ref 10–53)
AST SERPL-CCNC: 21 U/L (ref 15–37)
BASOPHILS # BLD AUTO: 0 TH/MM3 (ref 0–0.2)
BASOPHILS NFR BLD: 0.3 % (ref 0–2)
BILIRUB SERPL-MCNC: 0.4 MG/DL (ref 0.2–1)
BUN SERPL-MCNC: 9 MG/DL (ref 7–18)
CALCIUM SERPL-MCNC: 8.9 MG/DL (ref 8.5–10.1)
CHLORIDE SERPL-SCNC: 101 MEQ/L (ref 98–107)
COLOR UR: (no result)
CREAT SERPL-MCNC: 0.79 MG/DL (ref 0.5–1)
EOSINOPHIL # BLD: 0.1 TH/MM3 (ref 0–0.4)
EOSINOPHIL NFR BLD: 0.7 % (ref 0–4)
ERYTHROCYTE [DISTWIDTH] IN BLOOD BY AUTOMATED COUNT: 19.1 % (ref 11.6–17.2)
GFR SERPLBLD BASED ON 1.73 SQ M-ARVRAT: 81 ML/MIN (ref 89–?)
GLUCOSE SERPL-MCNC: 67 MG/DL (ref 74–106)
GLUCOSE UR STRIP-MCNC: (no result) MG/DL
HCO3 BLD-SCNC: 27.4 MEQ/L (ref 21–32)
HCT VFR BLD CALC: 22.9 % (ref 35–46)
HGB BLD-MCNC: 7.4 GM/DL (ref 11.6–15.3)
HGB UR QL STRIP: (no result)
INR PPP: 1 RATIO
KETONES UR STRIP-MCNC: (no result) MG/DL
LYMPHOCYTES # BLD AUTO: 0.7 TH/MM3 (ref 1–4.8)
LYMPHOCYTES NFR BLD AUTO: 5.9 % (ref 9–44)
MCH RBC QN AUTO: 24.4 PG (ref 27–34)
MCHC RBC AUTO-ENTMCNC: 32.4 % (ref 32–36)
MCV RBC AUTO: 75.4 FL (ref 80–100)
MONOCYTE #: 0.8 TH/MM3 (ref 0–0.9)
MONOCYTES NFR BLD: 7.2 % (ref 0–8)
NEUTROPHILS # BLD AUTO: 10 TH/MM3 (ref 1.8–7.7)
NEUTROPHILS NFR BLD AUTO: 85.9 % (ref 16–70)
NITRITE UR QL STRIP: (no result)
PLATELET # BLD: 625 TH/MM3 (ref 150–450)
PMV BLD AUTO: 6.7 FL (ref 7–11)
PROT SERPL-MCNC: 8.5 GM/DL (ref 6.4–8.2)
PROTHROMBIN TIME: 10.6 SEC (ref 9.8–11.6)
RBC # BLD AUTO: 3.04 MIL/MM3 (ref 4–5.3)
SODIUM SERPL-SCNC: 137 MEQ/L (ref 136–145)
SP GR UR STRIP: 1 (ref 1–1.03)
URINE LEUKOCYTE ESTERASE: (no result)
WBC # BLD AUTO: 11.6 TH/MM3 (ref 4–11)

## 2018-03-07 PROCEDURE — C1729 CATH, DRAINAGE: HCPCS

## 2018-03-07 PROCEDURE — 88305 TISSUE EXAM BY PATHOLOGIST: CPT

## 2018-03-07 PROCEDURE — 86304 IMMUNOASSAY TUMOR CA 125: CPT

## 2018-03-07 PROCEDURE — 86038 ANTINUCLEAR ANTIBODIES: CPT

## 2018-03-07 PROCEDURE — 82103 ALPHA-1-ANTITRYPSIN TOTAL: CPT

## 2018-03-07 PROCEDURE — 77012 CT SCAN FOR NEEDLE BIOPSY: CPT

## 2018-03-07 PROCEDURE — P9016 RBC LEUKOCYTES REDUCED: HCPCS

## 2018-03-07 PROCEDURE — 84439 ASSAY OF FREE THYROXINE: CPT

## 2018-03-07 PROCEDURE — 86255 FLUORESCENT ANTIBODY SCREEN: CPT

## 2018-03-07 PROCEDURE — 94010 BREATHING CAPACITY TEST: CPT

## 2018-03-07 PROCEDURE — 72157 MRI CHEST SPINE W/O & W/DYE: CPT

## 2018-03-07 PROCEDURE — 73502 X-RAY EXAM HIP UNI 2-3 VIEWS: CPT

## 2018-03-07 PROCEDURE — 94726 PLETHYSMOGRAPHY LUNG VOLUMES: CPT

## 2018-03-07 PROCEDURE — 80048 BASIC METABOLIC PNL TOTAL CA: CPT

## 2018-03-07 PROCEDURE — 85730 THROMBOPLASTIN TIME PARTIAL: CPT

## 2018-03-07 PROCEDURE — 80307 DRUG TEST PRSMV CHEM ANLYZR: CPT

## 2018-03-07 PROCEDURE — 88313 SPECIAL STAINS GROUP 2: CPT

## 2018-03-07 PROCEDURE — C9113 INJ PANTOPRAZOLE SODIUM, VIA: HCPCS

## 2018-03-07 PROCEDURE — 77001 FLUOROGUIDE FOR VEIN DEVICE: CPT

## 2018-03-07 PROCEDURE — 76942 ECHO GUIDE FOR BIOPSY: CPT

## 2018-03-07 PROCEDURE — 88342 IMHCHEM/IMCYTCHM 1ST ANTB: CPT

## 2018-03-07 PROCEDURE — 72197 MRI PELVIS W/O & W/DYE: CPT

## 2018-03-07 PROCEDURE — 86703 HIV-1/HIV-2 1 RESULT ANTBDY: CPT

## 2018-03-07 PROCEDURE — 36561 INSERT TUNNELED CV CATH: CPT

## 2018-03-07 PROCEDURE — 85097 BONE MARROW INTERPRETATION: CPT

## 2018-03-07 PROCEDURE — 96374 THER/PROPH/DIAG INJ IV PUSH: CPT

## 2018-03-07 PROCEDURE — 86301 IMMUNOASSAY TUMOR CA 19-9: CPT

## 2018-03-07 PROCEDURE — 99153 MOD SED SAME PHYS/QHP EA: CPT

## 2018-03-07 PROCEDURE — 88307 TISSUE EXAM BY PATHOLOGIST: CPT

## 2018-03-07 PROCEDURE — 86900 BLOOD TYPING SEROLOGIC ABO: CPT

## 2018-03-07 PROCEDURE — 38222 DX BONE MARROW BX & ASPIR: CPT

## 2018-03-07 PROCEDURE — 30233N1 TRANSFUSION OF NONAUTOLOGOUS RED BLOOD CELLS INTO PERIPHERAL VEIN, PERCUTANEOUS APPROACH: ICD-10-PCS | Performed by: EMERGENCY MEDICINE

## 2018-03-07 PROCEDURE — 93306 TTE W/DOPPLER COMPLETE: CPT

## 2018-03-07 PROCEDURE — 82105 ALPHA-FETOPROTEIN SERUM: CPT

## 2018-03-07 PROCEDURE — 82378 CARCINOEMBRYONIC ANTIGEN: CPT

## 2018-03-07 PROCEDURE — 94729 DIFFUSING CAPACITY: CPT

## 2018-03-07 PROCEDURE — 83020 HEMOGLOBIN ELECTROPHORESIS: CPT

## 2018-03-07 PROCEDURE — 88341 IMHCHEM/IMCYTCHM EA ADD ANTB: CPT

## 2018-03-07 PROCEDURE — 83615 LACTATE (LD) (LDH) ENZYME: CPT

## 2018-03-07 PROCEDURE — 83540 ASSAY OF IRON: CPT

## 2018-03-07 PROCEDURE — 82746 ASSAY OF FOLIC ACID SERUM: CPT

## 2018-03-07 PROCEDURE — 86920 COMPATIBILITY TEST SPIN: CPT

## 2018-03-07 PROCEDURE — 84703 CHORIONIC GONADOTROPIN ASSAY: CPT

## 2018-03-07 PROCEDURE — 84550 ASSAY OF BLOOD/URIC ACID: CPT

## 2018-03-07 PROCEDURE — 84443 ASSAY THYROID STIM HORMONE: CPT

## 2018-03-07 PROCEDURE — 82390 ASSAY OF CERULOPLASMIN: CPT

## 2018-03-07 PROCEDURE — A9579 GAD-BASE MR CONTRAST NOS,1ML: HCPCS

## 2018-03-07 PROCEDURE — C1788 PORT, INDWELLING, IMP: HCPCS

## 2018-03-07 PROCEDURE — 76937 US GUIDE VASCULAR ACCESS: CPT

## 2018-03-07 PROCEDURE — 85610 PROTHROMBIN TIME: CPT

## 2018-03-07 PROCEDURE — 96375 TX/PRO/DX INJ NEW DRUG ADDON: CPT

## 2018-03-07 PROCEDURE — 88311 DECALCIFY TISSUE: CPT

## 2018-03-07 PROCEDURE — 96361 HYDRATE IV INFUSION ADD-ON: CPT

## 2018-03-07 PROCEDURE — 82728 ASSAY OF FERRITIN: CPT

## 2018-03-07 PROCEDURE — 94060 EVALUATION OF WHEEZING: CPT

## 2018-03-07 PROCEDURE — 87040 BLOOD CULTURE FOR BACTERIA: CPT

## 2018-03-07 PROCEDURE — 86850 RBC ANTIBODY SCREEN: CPT

## 2018-03-07 PROCEDURE — 83550 IRON BINDING TEST: CPT

## 2018-03-07 PROCEDURE — 83520 IMMUNOASSAY QUANT NOS NONAB: CPT

## 2018-03-07 PROCEDURE — 85652 RBC SED RATE AUTOMATED: CPT

## 2018-03-07 PROCEDURE — 47000 NEEDLE BIOPSY OF LIVER PERQ: CPT

## 2018-03-07 PROCEDURE — 36430 TRANSFUSION BLD/BLD COMPNT: CPT

## 2018-03-07 PROCEDURE — G0378 HOSPITAL OBSERVATION PER HR: HCPCS

## 2018-03-07 PROCEDURE — 38505 NEEDLE BIOPSY LYMPH NODES: CPT

## 2018-03-07 PROCEDURE — C1830 POWER BONE MARROW BX NEEDLE: HCPCS

## 2018-03-07 PROCEDURE — 86901 BLOOD TYPING SEROLOGIC RH(D): CPT

## 2018-03-07 PROCEDURE — 82607 VITAMIN B-12: CPT

## 2018-03-07 PROCEDURE — 80074 ACUTE HEPATITIS PANEL: CPT

## 2018-03-07 PROCEDURE — 74018 RADEX ABDOMEN 1 VIEW: CPT

## 2018-03-07 PROCEDURE — 85025 COMPLETE CBC W/AUTO DIFF WBC: CPT

## 2018-03-07 PROCEDURE — 96376 TX/PRO/DX INJ SAME DRUG ADON: CPT

## 2018-03-07 PROCEDURE — 72158 MRI LUMBAR SPINE W/O & W/DYE: CPT

## 2018-03-07 PROCEDURE — 99152 MOD SED SAME PHYS/QHP 5/>YRS: CPT

## 2018-03-07 PROCEDURE — 80053 COMPREHEN METABOLIC PANEL: CPT

## 2018-03-07 PROCEDURE — 81001 URINALYSIS AUTO W/SCOPE: CPT

## 2018-03-07 NOTE — RADRPT
EXAM DATE/TIME:  03/07/2018 09:22 

 

[facility] COMPARISON:     

No previous studies available for comparison.

 

 

 

INDICATIONS :      

Liver mass.

                     

 

 

 

SEDATION TIME:       

45 minutes

 

 

BIOPSY SITE:       

Left 

                     

 

MEDICATION(S):

1.) 5 mg midazolam (Versed) IV  

2.) 250 mcg fentanyl (Sublimaze) IV  

 

 

DEVICE(S):

1.) 18 gauge Temno core biopsy needle 

                     

 

MEDICAL HISTORY :     

None.   

 

SURGICAL HISTORY :     

None.   

 

ENCOUNTER:     

Initial

 

ACUITY:     

1 day

 

PAIN SCORE:     

0/10

 

LOCATION:     

Left 

                     

A total of three core specimen(s) were obtained and sent to the laboratory for pathologic evaluation.


 

 

PROCEDURE:

1.   CT guided liver biopsy.

2.   Conscious sedation with continuous EKG and oximetry monitoring.

3.   EKG and oximetry remained stable throughout the procedure.

 

Prior to the procedure informed consent was obtained.  Any appropriate prior imaging studies were rev
iewed. 

Using automated exposure control and adjustment of the mA and/or kV according to patient size, radiat
ion dose was kept as low as reasonably achievable to obtain optimal diagnostic quality images.  DICOM
 format image data is available electronically for review and comparison.   

 

 

The site was prepped in a sterile fashion.  Full sterile technique was used, including cap, mask, kaykay
rile gloves and gown and a large sterile sheet.  Hand hygiene and 2% chlorhexidine and/or betadine/al
cohol prep was utilized per protocol for cutaneous antisepsis.  The skin and subcutaneous tissues wer
e infiltrated with local anesthetic solution.

 

With CT guidance the previously identified target was localized. Biopsy was performed using the presc
ribed needle as above.  Adequate hemostasis was obtained with compression at the puncture site.

 

Follow-up CT scan reveals no hemorrhage.

 

The patient tolerated the procedure well and there were no complications. The patient was returned to
 the Radiology Outpatient Unit in stable condition.

 

CONCLUSION:     Uncomplicated CT guided biopsy of a left hepatic mass.

 

 

 

 Michele Vela MD on March 07, 2018 at 10:51           

Board Certified Radiologist.

 This report was verified electronically.

## 2018-03-07 NOTE — HHI.HP
HPI


Service


[facility]


Primary Care Physician


Diana Carbajal M.D.


Admission Diagnosis





symptomatic anemia


Chief Complaint:  


weakness s/p liver bx with anemia


Travel History


International Travel<30 Days:  No


Contact w/Intl Traveler <30 Da:  No


Traveled to Known Affected Are:  No


History of Present Illness





39 YO F presents to the ED for evaluation of multiple medical complaints.  She 

endorses intermittent dizziness. She states that she woke up around 530am and 

had an oral temp of 103.  She states that she also experienced stinging pain in 

the tailbone. No known injury. She complains of stinging pruritus of the back.  

She complains of left hip pain without trauma.  She complains of intermittent 

right shin pain which she states is "in the bone."  She states that she has 

lost 20 pounds since January.  She endorses night sweats for an unknown period 

of time. She endorses 3 week history of nonproductive cough. She denies CP, SOB

, palpitations. She endorses chronic constipation,one bowel movement a week "

for my lifetime."  States her stools have been dark lately.  She denies anorexia

, N/V,abdominal pain, diarrhea. She denies risk of pregnancy, states that LMP 

was " about a month ago."  She is a current smoker. She endorse occasionally 

snorting cocaine. She endorses occasional alcohol use.  She denies history of 

IVDA.  She states that she had CT guided biopsies of a liver mass and a right 

inguinal lymph node this AM, was afebrile at that time. She was discharged from 

the procedure around 3pm today. She complains of right inguinal pain at the 

needle insertion site of the right inguinal region. She states that her PCP 

called her and told her to come to the ED for "low hemoglobin and problems with 

the biopsies."   She is followed by Dr. Carbajal.  As outpatient found to have 

liver abnormality and rt inguinal lyph node abnormality and were biopsied today 

,patient had 1 week ago hgb 8 now 7.5 and syptomatic admit for blood 

transfusion and further work up did have trace positive stools.





Formerly [facility], [facility]





Review of Systems


Constitutional:  COMPLAINS OF: Diaphoretic episodes, Fatigue, Fever, Weight loss

, Change in appetite, Night Sweats


Gastrointestinal:  COMPLAINS OF: Abdominal pain, Black stools, GERD


Neurologic:  COMPLAINS OF: Localized weakness





Past Family Social History


Past Medical History


recent work up for abdominal pain,liver and rt inguinal node bx,hx pneumonia


Past Surgical History


ovarian cyst


Reported Medications





Magnesium Oxide 250 Mg Tab 250 Mg PO DAILY


Ferrous Sulfate 325 Mg (65 Mg Iron) Tablet 325 Mg PO DAILY


Vitamin B-12 (Cyanocobalamin) 500 Mcg Tab 500 Mcg PO DAILY


Calcium Gluconate 45 Mg Calcium (500 Mg) Tab 500 Mg PO DAILY


     1 gram of salt is 93 mg elemental calcium.


Fish Oil + D3 (Fish Oil-Cholecalciferol) 1,200-1,000 Mg-Unit Cap 1 Cap PO DAILY


Probiotic (Saccharomyces Boulardii) 250 Mg Cap 250 Mg PO BID


Xyzal (Levocetirizine Dihydrochloride) 2.5 Mg/5 Ml Solution 2.5 Mg PO DAILY


Meloxicam 7.5 Mg Tab 7.5 Mg PO DAILY


Hydroxyzine HCl 50 Mg Tab 50 Mg PO HS


Ranitidine (Ranitidine HCl) 150 Mg Tab 150 Mg PO DAILY


Allergies:  


Coded Allergies:  


     No Known Allergies (Verified  Adverse Reaction, Unknown, 1/3/18)


Social History


former smoker occ etoh





Physical Exam


Vital Signs





Vital Signs








  Date Time  Temp Pulse Resp B/P (MAP) Pulse Ox O2 Delivery O2 Flow Rate FiO2


 


3/7/18 20:55 98.7 100 16 104/62 99   


 


3/7/18 20:01  91 16 95/52 (66) 99 Room Air  


 


3/7/18 17:30     98   


 


3/7/18 16:36 98.2 90 18 90/54 (66) 100   








Physical Exam


GENERAL: This is a well-nourished, well-developed patient, in no apparent 

distress.


SKIN: No rashes, ecchymoses or lesions. Cool and dry.


HEAD: Atraumatic. Normocephalic. No temporal or scalp tenderness.


EYES: Pupils equal round and reactive. Extraocular motions intact. No scleral 

icterus. No injection or drainage. 


ENT: Nose without bleeding, purulent drainage or septal hematoma. Throat 

without erythema, tonsillar hypertrophy or exudate. Uvula midline. Airway 

patent.


NECK: Trachea midline. No JVD or lymphadenopathy. Supple, nontender, no 

meningeal signs.


CARDIOVASCULAR: Regular rate and rhythm without murmurs, gallops, or rubs. 


RESPIRATORY: Clear to auscultation. Breath sounds equal bilaterally. No wheezes

, rales, or rhonchi.  


GASTROINTESTINAL: Abdomen soft, mild tender, nondistended. No hepato-

splenomegaly, or palpable masses. No guarding.guiac trace positive


MUSCULOSKELETAL: Extremities without clubbing, cyanosis, or edema. No joint 

tenderness, effusion, or edema noted. No calf tenderness. Negative Homans sign 

bilaterally.


NEUROLOGICAL: Awake and alert. Cranial nerves II through XII intact.  Motor and 

sensory grossly within normal limits. Five out of 5 muscle strength in all 

muscle groups.  Normal speech.


Laboratory





Laboratory Tests








Test


  3/7/18


17:25


 


White Blood Count 11.6 


 


Red Blood Count 3.04 


 


Hemoglobin 7.4 


 


Hematocrit 22.9 


 


Mean Corpuscular Volume 75.4 


 


Mean Corpuscular Hemoglobin 24.4 


 


Mean Corpuscular Hemoglobin


Concent 32.4 


 


 


Red Cell Distribution Width 19.1 


 


Platelet Count 625 


 


Mean Platelet Volume 6.7 


 


Neutrophils (%) (Auto) 85.9 


 


Lymphocytes (%) (Auto) 5.9 


 


Monocytes (%) (Auto) 7.2 


 


Eosinophils (%) (Auto) 0.7 


 


Basophils (%) (Auto) 0.3 


 


Neutrophils # (Auto) 10.0 


 


Lymphocytes # (Auto) 0.7 


 


Monocytes # (Auto) 0.8 


 


Eosinophils # (Auto) 0.1 


 


Basophils # (Auto) 0.0 


 


CBC Comment DIFF FINAL 


 


Differential Comment  


 


Prothrombin Time 10.6 


 


Prothromb Time International


Ratio 1.0 


 


 


Activated Partial


Thromboplast Time 45.5 


 


 


Urine Color LIGHT-YELLOW 


 


Urine Turbidity CLEAR 


 


Urine pH 6.0 


 


Urine Specific Gravity 1.001 


 


Urine Protein NEG 


 


Urine Glucose (UA) NEG 


 


Urine Ketones NEG 


 


Urine Occult Blood NEG 


 


Urine Nitrite NEG 


 


Urine Bilirubin NEG 


 


Urine Urobilinogen LESS THAN 2.0 


 


Urine Leukocyte Esterase NEG 


 


Urine WBC LESS THAN 1 


 


Microscopic Urinalysis Comment


  CULT NOT


INDICATED


 


Blood Urea Nitrogen 9 


 


Creatinine 0.79 


 


Random Glucose 67 


 


Total Protein 8.5 


 


Albumin 2.8 


 


Calcium Level 8.9 


 


Alkaline Phosphatase 220 


 


Aspartate Amino Transf


(AST/SGOT) 21 


 


 


Alanine Aminotransferase


(ALT/SGPT) 26 


 


 


Total Bilirubin 0.4 


 


Sodium Level 137 


 


Potassium Level 3.4 


 


Chloride Level 101 


 


Carbon Dioxide Level 27.4 


 


Anion Gap 9 


 


Estimat Glomerular Filtration


Rate 81 


 


 


Ethyl Alcohol Level LESS THAN 3 








Result Diagram:  


3/7/18 1725                                                                    

            3/7/18 1725





Imaging





Last 24 hours Impressions








Hip and Pelvis X-Ray 3/7/18 1854 Signed





Impressions: 





 Service Date/Time:  2018 19:11 - CONCLUSION:  Negative 





 evaluation of the left hip.     Roby Matos MD 








Course


in er had decrease blood pressure received fluid which improved blood pressure 

and received morphine for pain





Caprini VTE Risk Assessment


Caprini VTE Risk Assessment:  No/Low Risk (score <= 1)


Caprini Risk Assessment Model











 Point Value = 1          Point Value = 2  Point Value = 3  Point Value = 5


 


Age 41-60


Minor surgery


BMI > 25 kg/m2


Swollen legs


Varicose veins


Pregnancy or postpartum


History of unexplained or recurrent


   spontaneous 


Oral contraceptives or hormone


   replacement


Sepsis (< 1 month)


Serious lung disease, including


   pneumonia (< 1 month)


Abnormal pulmonary function


Acute myocardial infarction


Congestive heart failure (< 1 month)


History of inflammatory bowel disease


Medical patient at bed rest Age 61-74


Arthroscopic surgery


Major open surgery (> 45 min)


Laparoscopic surgery (> 45 min)


Malignancy


Confined to bed (> 72 hours)


Immobilizing plaster cast


Central venous access Age >= 75


History of VTE


Family history of VTE


Factor V Leiden


Prothrombin 41372C


Lupus anticoagulant


Anticardiolipin antibodies


Elevated serum homocysteine


Heparin-induced thrombocytopenia


Other congenital or acquired


   thrombophilia Stroke (< 1 month)


Elective arthroplasty


Hip, pelvis, or leg fracture


Acute spinal cord injury (< 1 month)








Prophylaxis Regimen











   Total Risk


Factor Score Risk Level Prophylaxis Regimen


 


0-1      Low Early ambulation


 


2 Moderate Order ONE of the following:


*Sequential Compression Device (SCD)


*Heparin 5000 units SQ BID


 


3-4 Higher Order ONE of the following medications:


*Heparin 5000 units SQ TID


*Enoxaparin/Lovenox 40 mg SQ daily (WT < 150 kg, CrCl > 30 mL/min)


*Enoxaparin/Lovenox 30 mg SQ daily (WT < 150 kg, CrCl > 10-29 mL/min)


*Enoxaparin/Lovenox 30 mg SQ BID (WT < 150 kg, CrCl > 30 mL/min)


AND/OR


*Sequential Compression Device (SCD)


 


5 or more Highest Order ONE of the following medications:


*Heparin 5000 units SQ TID (Preferred with Epidurals)


*Enoxaparin/Lovenox 40 mg SQ daily (WT < 150 kg, CrCl > 30 mL/min)


*Enoxaparin/Lovenox 30 mg SQ daily (WT < 150 kg, CrCl > 10-29 mL/min)


*Enoxaparin/Lovenox 30 mg SQ BID (WT < 150 kg, CrCl > 30 mL/min)


AND


*Sequential Compression Device (SCD)











Assessment and Plan


Problem List:  


(1) Symptomatic anemia


ICD Codes:  D64.9 - Anemia, unspecified


Plan:  will transfuse 2 units blood follow up labs GI consult had guiac 

positive stools





(2) Liver mass


ICD Codes:  R16.0 - Hepatomegaly, not elsewhere classified


Plan:  bx pending





(3) Lymph node enlargement


ICD Codes:  R59.9 - Enlarged lymph nodes, unspecified


Plan:  bx pending





Assessment and Plan


further plqan as per workup and results of biopsy


Code Status


full


Discussed Condition With


patient











Lonnie Benitez MD Mar 7, 2018 21:17

## 2018-03-07 NOTE — PD.RAD
Post CT Procedure Prog Note


Pre Procedure Diagnosis:  


(1) Liver mass


(2) Lymph node enlargement


Post Procedure Diagnosis:  


Procedure Date:


Mar 7, 2018


Supervising Radiologist:


Michele Vela


Anesthesia:  Local, Conscious Sedation


Plan of Activity


Patient to Unit:  [facility]


Patient Condition:  Good


See PACS Report for procedural detail/treatment





Biopsy


Imaging Guidance:  CT, Ultrasound


Biopsy Procedure:  Liver, Lymph Node


Specimen:  Core Biopsy


Findings:


Left liver mass and Rt inguinal enlarged lymph node











Michele Vela MD Mar 7, 2018 10:38

## 2018-03-07 NOTE — RADRPT
EXAM DATE/TIME:  03/07/2018 19:11 

 

[facility] COMPARISON:     

No previous studies available for comparison.

 

                     

INDICATIONS :     

Patient complains of left hip pain. No known injury.

                     

 

MEDICAL HISTORY :     

None.          

 

SURGICAL HISTORY :     

None.   

 

ENCOUNTER:     

Initial                                        

 

ACUITY:     

1 day      

 

PAIN SCORE:     

5/10

 

LOCATION:     

Left  Hip

 

FINDINGS:     

Examination of the left hip was performed with AP Pelvis.  The primary and secondary trabecular patte
rn of the femoral neck is intact.  The hip joint is of normal width without significant sclerosis or 
bony hypertrophy.  The acetabulum is grossly intact.  Multiple particles of stool within the adnexa c
ontrast is present in the left colon and sigmoid.

 

CONCLUSION:     

Negative evaluation of the left hip.

 

 

 

 Roby Matos MD on March 07, 2018 at 19:30           

Board Certified Radiologist.

 This report was verified electronically.

## 2018-03-07 NOTE — RADRPT
EXAM DATE/TIME:  03/07/2018 08:56 

 

[facility] COMPARISON:     

No previous studies available for comparison.

 

EXTERNAL COMPARISON:  

[facility],  CT Abdomen and CT Chest, Mar 2 2018.

 

 

INDICATIONS :      

Enlarged inguinal lymph node; right.

                     

                     

 

 

 

MEDICAL HISTORY :        

Ovarian cysts.  Gastritis.  Reflux.  

 

SURGICAL HISTORY :        

Ovarian cysts removed.

 

ENCOUNTER:     

Initial

 

ACUITY:     

1 week

 

PAIN SCORE:     

0/10

 

LOCATION:     

Right lower quadrant

                     

 

ORGAN:      

Right lymph node 

 

SPECIMENS:      

Six core specimen(s) submitted for pathologic evaluation.

 

DEVICE:      

18 gauge Temno needle

                     

Post procedure scanning reveals no hematoma or other complication.

 

The possibility does exist that the tissue obtained will be non-diagnostic.  If the sample is non-kayden
gnostic a repeat

biopsy or surgical biopsy may need to be performed.  

 

TECHNIQUE:  

1.  Ultrasound guidance for needle biopsy.

2.  Needle biopsy.

 

The risks, benefits and alternatives to the procedure were explained and verbal and written consent w
as obtained.  The site was prepped in sterile fashion.  Full sterile technique was used, including ca
p, mask, sterile gloves and gown and a large sterile sheet.  Hand hygiene and 2% chlorhexidine and/or
 betadine/alcohol prep was utilized per protocol for cutaneous antisepsis.  The skin and subcutaneous
 tissues were infiltrated with local anesthetic solution.  Sterile gel and sterile probe cover were u
tilized for ultrasound guidance.

 

With the patient on the ultrasound table, images were obtained.  

 

A needle was advanced into the identified target and the number of specimens as above obtained and crockett
bmitted for pathologic evaluation.

 

The patient tolerated the procedure well and left the ultrasound suite in stable condition. 

 

CONCLUSION:     

Uncomplicated ultrasound guided needle biopsy of an enlarged right inguinal lymph node..  

 

 

 

 Michele Vela MD on March 07, 2018 at 10:50           

Board Certified Radiologist.

 This report was verified electronically.

## 2018-03-07 NOTE — PD
HPI


Chief Complaint:  Medical Clearance


Time Seen by Provider:  16:50


Travel History


International Travel<30 days:  No


Contact w/Intl Traveler<30days:  No


Traveled to known affect area:  No





History of Present Illness


HPI


39 YO F presents to the ED for evaluation of multiple medical complaints.  She 

endorses intermittent dizziness. She states that she woke up around 530am and 

had an oral temp of 103.  She states that she also experienced stinging pain in 

the tailbone. No known injury. She complains of stinging pruritus of the back.  

She complains of left hip pain without trauma.  She complains of intermittent 

right shin pain which she states is "in the bone."  She states that she has 

lost 20 pounds since January.  She endorses night sweats for an unknown period 

of time. She endorses 3 week history of nonproductive cough. She denies CP, SOB

, palpitations. She endorses chronic constipation,one bowel movement a week "

for my lifetime."  States her stools have been dark lately.  She denies anorexia

, N/V,abdominal pain, diarrhea. She denies risk of pregnancy, states that LMP 

was " about a month ago."  She is a current smoker. She endorse occasionally 

snorting cocaine. She endorses occasional alcohol use.  She denies history of 

IVDA.  She states that she had CT guided biopsies of a liver mass and a right 

inguinal lymph node this AM, was afebrile at that time. She was discharged from 

the procedure around 3pm today. She complains of right inguinal pain at the 

needle insertion site of the right inguinal region. She states that her PCP 

called her and told her to come to the ED for "low hemoglobin and problems with 

the biopsies."   She is followed by Dr. Carbajal.





[facility]


Past Medical History


Anxiety:  Yes


Depression:  Yes


Cancer:  No


Cardiovascular Problems:  No


Chemotherapy:  No


Diminished Hearing:  No


Endocrine:  No


Gastrointestinal Disorders:  Yes


Genitourinary:  No


Immune Disorder:  No


Musculoskeletal:  No


Neurologic:  No


Psychiatric:  Yes


Reproductive:  No


Respiratory:  No


Radiation Therapy:  No


:  1


Miscarriage:  1


Ovarian Cysts:  Yes (CYSTS REMOVED )





Past Surgical History


Abdominal Surgery:  Yes (ESOPHAGEAL STRETCH )


Gynecologic Surgery:  Yes (OVARIAN CYST   )


Other Surgery:  Yes (BILATERAL OVARIAN CYST REMOVAL)





Social History


Alcohol Use:  Yes (WEEKENDS)


Tobacco Use:  No (QUIT )


Substance Use:  No (DENIES)





Allergies-Medications


(Allergen,Severity, Reaction):  


Coded Allergies:  


     No Known Allergies (Verified  Adverse Reaction, Unknown, 1/3/18)


Reported Meds & Prescriptions





Reported Meds & Active Scripts


Active


Reported


Magnesium Oxide 250 Mg Tab 250 Mg PO DAILY


Ferrous Sulfate 325 Mg (65 Mg Iron) Tablet 325 Mg PO DAILY


Vitamin B-12 (Cyanocobalamin) 500 Mcg Tab 500 Mcg PO DAILY


Calcium Gluconate 45 Mg Calcium (500 Mg) Tab 500 Mg PO DAILY


     1 gram of salt is 93 mg elemental calcium.


Fish Oil + D3 (Fish Oil-Cholecalciferol) 1,200-1,000 Mg-Unit Cap 1 Cap PO DAILY


Probiotic (Saccharomyces Boulardii) 250 Mg Cap 250 Mg PO BID


Xyzal (Levocetirizine Dihydrochloride) 2.5 Mg/5 Ml Solution 2.5 Mg PO DAILY


Meloxicam 7.5 Mg Tab 7.5 Mg PO DAILY


Hydroxyzine HCl 50 Mg Tab 50 Mg PO HS


Ranitidine (Ranitidine HCl) 150 Mg Tab 150 Mg PO DAILY








Review of Systems


Except as stated in HPI:  all other systems reviewed are Neg





Physical Exam


Narrative


GENERAL: Well-nourished, well-developed thin, anxious white female in no acute 

distress.


SKIN: Focused skin assessment warm/dry.  No rashes noted.  Tiny puncture wound 

in the right groin without signs of hematoma or active bleeding.  Tiny puncture 

wound in the epigastric region with no active bleeding or signs of hematoma.


HEAD: Normocephalic.


EYES: No scleral icterus. No injection or drainage. 


NECK: Supple, trachea midline. No JVD or lymphadenopathy.


CARDIOVASCULAR: Regular rate and rhythm without murmurs, gallops, or rubs. 


RESPIRATORY: Breath sounds clear and equal bilaterally. No accessory muscle use.


GASTROINTESTINAL: Abdomen soft, non-tender, nondistended.  Active bowel sounds.


MUSCULOSKELETAL: No cyanosis, or edema.  No tenderness to palpation over the 

tailbone, left hip, right hip or left shin.


BACK: Nontender without obvious deformity. No CVA tenderness.





Data


Data


Last Documented VS





Vital Signs








  Date Time  Temp Pulse Resp B/P (MAP) Pulse Ox O2 Delivery O2 Flow Rate FiO2


 


3/7/18 17:30     98   


 


3/7/18 16:36 98.2 90 18 90/54 (66)    








Orders





 Orders


Complete Blood Count With Diff (3/7/18 17:04)


Comprehensive Metabolic Panel (3/7/18 17:04)


Prothrombin Time / Inr (Pt) (3/7/18 17:04)


Act Partial Throm Time (Ptt) (3/7/18 17:04)


Urinalysis - C+S If Indicated (3/7/18 17:04)


Iv Access Insert/Monitor (3/7/18 17:04)


Ecg Monitoring (3/7/18 17:04)


Oximetry (3/7/18 17:04)


Sodium Chlor 0.9% 1000 Ml Inj (Ns 1000 M (3/7/18 17:04)


Sodium Chloride 0.9% Flush (Ns Flush) (3/7/18 17:15)


Ed Urine Pregnancytest Poc (3/7/18 17:04)


Drug Screen, Random Urine (3/7/18 17:04)


Type And Screen (3/7/18 17:04)


Hydroxyzine Pamoate (Vistaril) (3/7/18 17:15)


Alcohol (Ethanol) (3/7/18 17:08)


Morphine Inj (Morphine Inj) (3/7/18 18:45)


Ondansetron Inj (Zofran Inj) (3/7/18 18:45)


Hip, Uni(Ap&Lat) W Ap Pelvis (3/7/18 18:54)


Blood Product Administration (3/7/18 18:56)


Sodium Chlor 0.9% 250 Ml Inj (Ns 250 Ml (3/7/18 19:00)


Diphenhydramine (Benadryl) (3/7/18 19:00)


Acetaminophen (Tylenol) (3/7/18 19:00)


Red Blood Cells (Rbc) (3/7/18 18:56)


Admit Order (Ed Use Only) (3/7/18 19:46)


Vital Signs (Adult) Q4H (3/7/18 19:46)


Activity Oob With Assistance (3/7/18 19:46)


Notify Dr: Other (3/7/18 19:46)


Diet Regular Basic (3/7/18 Dinner)





Labs





Laboratory Tests








Test


  3/7/18


17:25


 


White Blood Count 11.6 TH/MM3 


 


Red Blood Count 3.04 MIL/MM3 


 


Hemoglobin 7.4 GM/DL 


 


Hematocrit 22.9 % 


 


Mean Corpuscular Volume 75.4 FL 


 


Mean Corpuscular Hemoglobin 24.4 PG 


 


Mean Corpuscular Hemoglobin


Concent 32.4 % 


 


 


Red Cell Distribution Width 19.1 % 


 


Platelet Count 625 TH/MM3 


 


Mean Platelet Volume 6.7 FL 


 


Neutrophils (%) (Auto) 85.9 % 


 


Lymphocytes (%) (Auto) 5.9 % 


 


Monocytes (%) (Auto) 7.2 % 


 


Eosinophils (%) (Auto) 0.7 % 


 


Basophils (%) (Auto) 0.3 % 


 


Neutrophils # (Auto) 10.0 TH/MM3 


 


Lymphocytes # (Auto) 0.7 TH/MM3 


 


Monocytes # (Auto) 0.8 TH/MM3 


 


Eosinophils # (Auto) 0.1 TH/MM3 


 


Basophils # (Auto) 0.0 TH/MM3 


 


CBC Comment DIFF FINAL 


 


Differential Comment  


 


Prothrombin Time 10.6 SEC 


 


Prothromb Time International


Ratio 1.0 RATIO 


 


 


Activated Partial


Thromboplast Time 45.5 SEC 


 


 


Urine Color LIGHT-YELLOW 


 


Urine Turbidity CLEAR 


 


Urine pH 6.0 


 


Urine Specific Gravity 1.001 


 


Urine Protein NEG mg/dL 


 


Urine Glucose (UA) NEG mg/dL 


 


Urine Ketones NEG mg/dL 


 


Urine Occult Blood NEG 


 


Urine Nitrite NEG 


 


Urine Bilirubin NEG 


 


Urine Urobilinogen


  LESS THAN 2.0


MG/DL


 


Urine Leukocyte Esterase NEG 


 


Urine WBC


  LESS THAN 1


/hpf


 


Microscopic Urinalysis Comment


  CULT NOT


INDICATED


 


Blood Urea Nitrogen 9 MG/DL 


 


Creatinine 0.79 MG/DL 


 


Random Glucose 67 MG/DL 


 


Total Protein 8.5 GM/DL 


 


Albumin 2.8 GM/DL 


 


Calcium Level 8.9 MG/DL 


 


Alkaline Phosphatase 220 U/L 


 


Aspartate Amino Transf


(AST/SGOT) 21 U/L 


 


 


Alanine Aminotransferase


(ALT/SGPT) 26 U/L 


 


 


Total Bilirubin 0.4 MG/DL 


 


Sodium Level 137 MEQ/L 


 


Potassium Level 3.4 MEQ/L 


 


Chloride Level 101 MEQ/L 


 


Carbon Dioxide Level 27.4 MEQ/L 


 


Anion Gap 9 MEQ/L 


 


Estimat Glomerular Filtration


Rate 81 ML/MIN 


 


 


Ethyl Alcohol Level


  LESS THAN 3


MG/DL











MDM


Medical Decision Making


Medical Screen Exam Complete:  Yes


Emergency Medical Condition:  Yes


Differential Diagnosis


Symptomatically anemia versus GI bleed versus musculoskeletal pain versus 

metabolic derangement versus other


Narrative Course


38-year-old female presents to the ED for evaluation of intermittent dizziness, 

nights sweats, bone pain, pruritus weight loss, dark stools.  She had CT guided 

biopsies of a liver mass and right inguinal lymph nodes this a.m.  On 

presentation she complains of pain in the right groin needle insertion site.  

She states that her PCPs office called and told her to come to the ED for low 

hemoglobin.  She is followed by Dr. Carbajal.  Vitals reviewed, BP 90/54 on 

presentation.  Physical exam reveals no focal neuro deficits.  Chest CT AB.  

Abdomen soft and nontender.  No evidence of hematoma or active bleeding at the 

CT biopsy sites.  IV was established.  Patient was administered 50 mg Vistaril 

and 1 L normal saline.  She complained of pain and was administered 4 mg 

morphine and 4 mg Zofran.





I spoke with Dr. Carbajal who states that she has seen the patient once.  She 

states that the patient's hemoglobin has dropped from 8 to 7 in 1 week.  She 

states that she sent the patient for biopsies and is suspicious of lymphoma.





CBC: WBC 11.6, hemoglobin 7.4.  Hematocrit 22.9.  Platelets 625.


INR 1.0.


CMP:  BUN 9.  Creatinine 0.79.


ER impression test: Negative.


UA: No culture indicated.


Alcohol less than 3.


Hip and pelvis x-ray: Negative evaluation of the left hip per radiology read.





I discussed the results of the workup with the patient as well as my 

recommendation for transfusion and observation.  Patient is agreeable to this 

plan.  2 units packed red blood cells to be administered pending type and 

screen.  I spoke with Dr. Joy who agrees to accept the patient to the 

medicine service.  Please see medicine notes for disposition.





[facility] Point of Care


Internal Pos. & Neg. Controls:  Passed


Fecal Specimen Occult Blood:  Positive


Comment


Faintly positive.











Julisa Plunkett Mar 7, 2018 17:18

## 2018-03-08 VITALS
SYSTOLIC BLOOD PRESSURE: 100 MMHG | RESPIRATION RATE: 18 BRPM | OXYGEN SATURATION: 98 % | TEMPERATURE: 97.6 F | HEART RATE: 84 BPM | DIASTOLIC BLOOD PRESSURE: 57 MMHG

## 2018-03-08 VITALS
SYSTOLIC BLOOD PRESSURE: 114 MMHG | HEART RATE: 90 BPM | TEMPERATURE: 98.8 F | OXYGEN SATURATION: 100 % | DIASTOLIC BLOOD PRESSURE: 60 MMHG | RESPIRATION RATE: 16 BRPM

## 2018-03-08 VITALS
OXYGEN SATURATION: 100 % | HEART RATE: 96 BPM | DIASTOLIC BLOOD PRESSURE: 62 MMHG | RESPIRATION RATE: 16 BRPM | TEMPERATURE: 98.5 F | SYSTOLIC BLOOD PRESSURE: 107 MMHG

## 2018-03-08 VITALS
DIASTOLIC BLOOD PRESSURE: 64 MMHG | HEART RATE: 102 BPM | TEMPERATURE: 98.4 F | RESPIRATION RATE: 16 BRPM | SYSTOLIC BLOOD PRESSURE: 114 MMHG | OXYGEN SATURATION: 100 %

## 2018-03-08 VITALS
TEMPERATURE: 98.3 F | RESPIRATION RATE: 16 BRPM | HEART RATE: 137 BPM | DIASTOLIC BLOOD PRESSURE: 63 MMHG | OXYGEN SATURATION: 99 % | SYSTOLIC BLOOD PRESSURE: 111 MMHG

## 2018-03-08 VITALS
HEART RATE: 89 BPM | OXYGEN SATURATION: 98 % | SYSTOLIC BLOOD PRESSURE: 101 MMHG | TEMPERATURE: 99.1 F | DIASTOLIC BLOOD PRESSURE: 53 MMHG | RESPIRATION RATE: 18 BRPM

## 2018-03-08 VITALS — TEMPERATURE: 98.7 F

## 2018-03-08 VITALS
HEART RATE: 106 BPM | TEMPERATURE: 98.6 F | DIASTOLIC BLOOD PRESSURE: 54 MMHG | SYSTOLIC BLOOD PRESSURE: 92 MMHG | OXYGEN SATURATION: 96 % | RESPIRATION RATE: 18 BRPM

## 2018-03-08 VITALS — TEMPERATURE: 103 F

## 2018-03-08 LAB
ALBUMIN SERPL-MCNC: 2.3 GM/DL (ref 3.4–5)
ALP SERPL-CCNC: 167 U/L (ref 45–117)
ALT SERPL-CCNC: 19 U/L (ref 10–53)
AST SERPL-CCNC: 20 U/L (ref 15–37)
BASOPHILS # BLD AUTO: 0 TH/MM3 (ref 0–0.2)
BASOPHILS NFR BLD: 0.2 % (ref 0–2)
BILIRUB SERPL-MCNC: 1.2 MG/DL (ref 0.2–1)
BUN SERPL-MCNC: 9 MG/DL (ref 7–18)
CALCIUM SERPL-MCNC: 8.1 MG/DL (ref 8.5–10.1)
CANCER AG125 SERPL-ACNC: 10.4 U/ML (ref 0–30.2)
CANCER AG19-9 SERPL-ACNC: 7 U/ML (ref 0–35)
CEA SERPL-MCNC: 0.4 NG/ML (ref 0.2–5)
CHLORIDE SERPL-SCNC: 102 MEQ/L (ref 98–107)
CREAT SERPL-MCNC: 0.62 MG/DL (ref 0.5–1)
EOSINOPHIL # BLD: 0.2 TH/MM3 (ref 0–0.4)
EOSINOPHIL NFR BLD: 1.8 % (ref 0–4)
ERYTHROCYTE [DISTWIDTH] IN BLOOD BY AUTOMATED COUNT: 18.8 % (ref 11.6–17.2)
GFR SERPLBLD BASED ON 1.73 SQ M-ARVRAT: 107 ML/MIN (ref 89–?)
GLUCOSE SERPL-MCNC: 80 MG/DL (ref 74–106)
HCO3 BLD-SCNC: 24.9 MEQ/L (ref 21–32)
HCT VFR BLD CALC: 26.8 % (ref 35–46)
HGB BLD-MCNC: 9.1 GM/DL (ref 11.6–15.3)
LYMPHOCYTES # BLD AUTO: 0.6 TH/MM3 (ref 1–4.8)
LYMPHOCYTES NFR BLD AUTO: 6.5 % (ref 9–44)
MCH RBC QN AUTO: 26 PG (ref 27–34)
MCHC RBC AUTO-ENTMCNC: 34 % (ref 32–36)
MCV RBC AUTO: 76.3 FL (ref 80–100)
MONOCYTE #: 0.7 TH/MM3 (ref 0–0.9)
MONOCYTES NFR BLD: 7.8 % (ref 0–8)
NEUTROPHILS # BLD AUTO: 7.6 TH/MM3 (ref 1.8–7.7)
NEUTROPHILS NFR BLD AUTO: 83.7 % (ref 16–70)
PLATELET # BLD: 524 TH/MM3 (ref 150–450)
PMV BLD AUTO: 6.8 FL (ref 7–11)
PROT SERPL-MCNC: 7.1 GM/DL (ref 6.4–8.2)
RBC # BLD AUTO: 3.51 MIL/MM3 (ref 4–5.3)
SODIUM SERPL-SCNC: 136 MEQ/L (ref 136–145)
T4 FREE SERPL-MCNC: 1.25 NG/DL (ref 0.76–1.46)
WBC # BLD AUTO: 9.1 TH/MM3 (ref 4–11)

## 2018-03-08 RX ADMIN — MORPHINE SULFATE PRN MG: 2 INJECTION, SOLUTION INTRAMUSCULAR; INTRAVENOUS at 16:20

## 2018-03-08 RX ADMIN — THIAMINE HYDROCHLORIDE SCH MLS/HR: 100 INJECTION, SOLUTION INTRAMUSCULAR; INTRAVENOUS at 05:44

## 2018-03-08 RX ADMIN — MORPHINE SULFATE PRN MG: 2 INJECTION, SOLUTION INTRAMUSCULAR; INTRAVENOUS at 00:13

## 2018-03-08 RX ADMIN — MORPHINE SULFATE PRN MG: 2 INJECTION, SOLUTION INTRAMUSCULAR; INTRAVENOUS at 12:16

## 2018-03-08 RX ADMIN — STANDARDIZED SENNA CONCENTRATE AND DOCUSATE SODIUM SCH TAB: 8.6; 5 TABLET, FILM COATED ORAL at 09:14

## 2018-03-08 RX ADMIN — MORPHINE SULFATE PRN MG: 2 INJECTION, SOLUTION INTRAMUSCULAR; INTRAVENOUS at 05:42

## 2018-03-08 RX ADMIN — Medication SCH ML: at 09:00

## 2018-03-08 RX ADMIN — MORPHINE SULFATE PRN MG: 2 INJECTION, SOLUTION INTRAMUSCULAR; INTRAVENOUS at 21:26

## 2018-03-08 RX ADMIN — THIAMINE HYDROCHLORIDE SCH MLS/HR: 100 INJECTION, SOLUTION INTRAMUSCULAR; INTRAVENOUS at 10:37

## 2018-03-08 RX ADMIN — Medication SCH ML: at 20:03

## 2018-03-08 RX ADMIN — WATER PRN ML: 1 IRRIGANT IRRIGATION at 16:22

## 2018-03-08 RX ADMIN — MORPHINE SULFATE PRN MG: 2 INJECTION, SOLUTION INTRAMUSCULAR; INTRAVENOUS at 09:13

## 2018-03-08 RX ADMIN — MORPHINE SULFATE SCH MG: 15 TABLET, EXTENDED RELEASE ORAL at 20:03

## 2018-03-08 RX ADMIN — ACETAMINOPHEN PRN MG: 325 TABLET ORAL at 05:10

## 2018-03-08 RX ADMIN — STANDARDIZED SENNA CONCENTRATE AND DOCUSATE SODIUM SCH TAB: 8.6; 5 TABLET, FILM COATED ORAL at 20:02

## 2018-03-08 NOTE — RADRPT
EXAM DATE/TIME:  03/08/2018 17:55 

 

[facility] COMPARISON:     

No previous studies available for comparison.

       

 

 

INDICATIONS :     

Mass. Back pain.

                     

 

CONTRAST:     

11 cc Omniscan (gadodiamide) IV

                     

 

MEDICAL HISTORY :     

None.     

 

SURGICAL HISTORY :           

Ovarian cyst removal.

 

ENCOUNTER:     

Initial

 

ACUITY:     

3 day

 

PAIN SCORE:     

8/10

 

LOCATION:         

Back.

 

TECHNIQUE:     

Multiplanar multisequence MRI examination of the sacrum/coccyx was performed.

 

FINDINGS:     

There is normal alignment configuration of the sacrum and coccyx.  There is a focal area of T2 prolon
gation in the superior right S2 vertebral body without signal abnormality on noncontrast T1, but ther
e is evidence of correlating enhancement on the postcontrast T1 scan.  There is some scattered areas 
of T2 prolongation in the sacroiliac bilaterally with some mild discernible enhancement on the postco
ntrast T1 weighted images.  Moderate amount of free fluid in the right dependent pelvis.

 

CONCLUSION:     

There are signal abnormalities in the superior S2 and bilateral sacroiliac with T2 prolongation and c
ontrast enhancement.  This suggests the possibility of metastatic lesions.

 

 

 

 Roby Matos MD on March 08, 2018 at 19:48           

Board Certified Radiologist.

 This report was verified electronically.

## 2018-03-08 NOTE — HHI.PR
Subjective


Remarks


Pt reports that she had previously been diagnosed with pneumonia in 12/2017 

when she was seen in the ED with a noted 


 RLL infiltrate. She was treated with Azithromycin, Prednisone and Albuterol 

Inhaler 


She had significant weight loss since that time of approx 20lbs. 


She has had various other complaints over the last few months of generalized 

pruritus, back pain, constipation, anemia and more recently


 she has had fevers, night sweats, shaking chills, and a non productive cough 

that comes and goes. 


She was seen as an outpt by GI and had evaluation with EGD with dilation in 2/ 2018 for complaints of dysphagia and odynophagia, which noted 


 gastritis/superficial ulceration of the antrum, esophagitis in the distal 

esophagus, and food bolus in the stomach. 


She was supposed to have an outpt colonoscopy which has not been performed yet 

for evaluation of anemia. 


She had also had outpt CT Chest/Abd/Pelvis which noted evidence of metastatic 

lesions in the lungs, liver, spleen, retroperitoneal lymphadenopathy,


 and inguinal lymphadenopathy. 


She underwent biopsy of the liver and right inguinal LN yesterday. She had 

increased pain at the biopsy sites and fever and was called for a noted


 decrease in her H/H on outpt labs and recommended to come to the ED for 

further evaluation. 





She reports continued pain at the biopsy sites, pain in her back and tailbone 

area which is described as a burning pain


She continues to have generalized pruritus


Pt also spiked a fever of 103 last night. 


No BM in almost a week





Objective


Vitals





Vital Signs








  Date Time  Temp Pulse Resp B/P (MAP) Pulse Ox O2 Delivery O2 Flow Rate FiO2


 


3/8/18 05:55 98.7       


 


3/8/18 05:07 103.0       


 


3/8/18 03:43 98.3 137 16 111/63 (79) 99   


 


3/8/18 03:06 98.4 102 16 114/64 100   


 


3/8/18 02:33 98.8 90 16 114/60 100   


 


3/7/18 23:54 97.3 94 16 93/53 (66) 97   


 


3/7/18 21:37 98.5 94 16 91/54 (66) 97   


 


3/7/18 20:55 98.7 100 16 104/62 99   


 


3/7/18 20:01  91 16 95/52 (66) 99 Room Air  


 


3/7/18 17:30     98   


 


3/7/18 16:36 98.2 90 18 90/54 (66) 100   














 3/8/18 3/8/18 3/9/18





 15:00 23:00 07:00


 


Intake Total 1200 ml  


 


Balance 1200 ml  


 


   


 


Intake Oral 1200 ml  


 


# Voids 3  








Result Diagram:  


3/8/18 0640                                                                    

            3/8/18 0640





Other Results





 Laboratory Tests








Test


  3/7/18


17:25 3/8/18


06:40


 


White Blood Count 11.6 TH/MM3  9.1 TH/MM3 


 


Red Blood Count 3.04 MIL/MM3  3.51 MIL/MM3 


 


Hemoglobin 7.4 GM/DL  9.1 GM/DL 


 


Hematocrit 22.9 %  26.8 % 


 


Mean Corpuscular Volume 75.4 FL  76.3 FL 


 


Mean Corpuscular Hemoglobin 24.4 PG  26.0 PG 


 


Mean Corpuscular Hemoglobin


Concent 32.4 % 


  34.0 % 


 


 


Red Cell Distribution Width 19.1 %  18.8 % 


 


Platelet Count 625 TH/MM3  524 TH/MM3 


 


Mean Platelet Volume 6.7 FL  6.8 FL 


 


Neutrophils (%) (Auto) 85.9 %  83.7 % 


 


Lymphocytes (%) (Auto) 5.9 %  6.5 % 


 


Monocytes (%) (Auto) 7.2 %  7.8 % 


 


Eosinophils (%) (Auto) 0.7 %  1.8 % 


 


Basophils (%) (Auto) 0.3 %  0.2 % 


 


Neutrophils # (Auto) 10.0 TH/MM3  7.6 TH/MM3 


 


Lymphocytes # (Auto) 0.7 TH/MM3  0.6 TH/MM3 


 


Monocytes # (Auto) 0.8 TH/MM3  0.7 TH/MM3 


 


Eosinophils # (Auto) 0.1 TH/MM3  0.2 TH/MM3 


 


Basophils # (Auto) 0.0 TH/MM3  0.0 TH/MM3 


 


CBC Comment DIFF FINAL  DIFF FINAL 


 


Differential Comment    


 


Prothrombin Time 10.6 SEC  


 


Prothromb Time International


Ratio 1.0 RATIO 


  


 


 


Activated Partial


Thromboplast Time 45.5 SEC 


  


 


 


Urine Color LIGHT-YELLOW  


 


Urine Turbidity CLEAR  


 


Urine pH 6.0  


 


Urine Specific Gravity 1.001  


 


Urine Protein NEG mg/dL  


 


Urine Glucose (UA) NEG mg/dL  


 


Urine Ketones NEG mg/dL  


 


Urine Occult Blood NEG  


 


Urine Nitrite NEG  


 


Urine Bilirubin NEG  


 


Urine Urobilinogen


  LESS THAN 2.0


MG/DL 


 


 


Urine Leukocyte Esterase NEG  


 


Urine WBC


  LESS THAN 1


/hpf 


 


 


Microscopic Urinalysis Comment


  CULT NOT


INDICATED 


 


 


Blood Urea Nitrogen 9 MG/DL  9 MG/DL 


 


Creatinine 0.79 MG/DL  0.62 MG/DL 


 


Random Glucose 67 MG/DL  80 MG/DL 


 


Total Protein 8.5 GM/DL  7.1 GM/DL 


 


Albumin 2.8 GM/DL  2.3 GM/DL 


 


Calcium Level 8.9 MG/DL  8.1 MG/DL 


 


Alkaline Phosphatase 220 U/L  167 U/L 


 


Aspartate Amino Transf


(AST/SGOT) 21 U/L 


  20 U/L 


 


 


Alanine Aminotransferase


(ALT/SGPT) 26 U/L 


  19 U/L 


 


 


Total Bilirubin 0.4 MG/DL  1.2 MG/DL 


 


Sodium Level 137 MEQ/L  136 MEQ/L 


 


Potassium Level 3.4 MEQ/L  3.6 MEQ/L 


 


Chloride Level 101 MEQ/L  102 MEQ/L 


 


Carbon Dioxide Level 27.4 MEQ/L  24.9 MEQ/L 


 


Anion Gap 9 MEQ/L  9 MEQ/L 


 


Estimat Glomerular Filtration


Rate 81 ML/MIN 


  107 ML/MIN 


 


 


Urine Opiates Screen NEG  


 


Urine Barbiturates Screen NEG  


 


Urine Amphetamines Screen NEG  


 


Urine Benzodiazepines Screen POS  


 


Urine Cocaine Screen NEG  


 


Urine Cannabinoids Screen NEG  


 


Ethyl Alcohol Level


  LESS THAN 3


MG/DL 


 








Imaging





Last 24 hours Impressions








Hip and Pelvis X-Ray 3/7/18 6450 Signed





Impressions: 





 Service Date/Time:  Wednesday, March 7, 2018 19:11 - CONCLUSION:  Negative 





 evaluation of the left hip.     Roby Matos MD 





Outpt Chest CT W/W/O IV contrast (3/2/18):


- Innumerable masses scattered throughout the lungs. Numerous lymph nodes 

throughout the mediastinum and both hilar regions





Outpt CT Abd/pelvis W/W/O contrast (3/2/18):


- There is widespread metastatic disease throughout the spleen, the liver and 

the retroperitoneum. Lymphoma most likely diagnosis versus conceivably melanoma 

or breast cancer. 


- Indeterminate sclerotic lesion in T12. 


- Liver with 2.7cm low-density lesion in the lateral segment of the left lobe 

of the liver. At least 5 other low-density lesions scattered throughout the 

liver concerning for metastatic disease. 


- Spleen is markedly heterogenous appearance with multiple masses. 


- Retroperitoneum with 2.2cm left periaortic lymph node below the left renal 

vein. At least 3 other retroperitoneal lymph nodes are present. 


- 3.0 x 1.6cm right inguinal lymph node. Some internal obturator 

lymphadenopathy on the right as well.


Objective Remarks


General: Thin female in no acute distress


Chest: CTA


Cardiac: Regular


Abd: +BS, soft nondistended, tenderness to palpation over the biopsy sites but 

no evidence of 


 drainage or erythema or swelling at the sites


Ext: No edema





A/P


Problem List:  


(1) Symptomatic anemia


ICD Codes:  D64.9 - Anemia, unspecified


Plan:  


- Pt is a 39 y/o female who was sent to the ED by her PCP for worsening anemia 

and leukocytosis on outpt labs. 


- Pt reported various complaints at the time of admission, including weight 

loss (Approx 20lbs in the last 3 months), intermittent dizziness, 


 fevers, "bone pain", night sweats. 


- Pts outpt H/H has decreased to Hgb 7.4/Hct 22.8, MCV 77, MCH 25 on 3/6/18 and 

was recommended to report to the ED for further eval. 


- Pt had previously reported intermittent black stools and was noted to be 

Hemoccult positive in the ED. 


- She was seen by GI in 2/2017 and had an EGD with dilation for complaints of 

dysphagia and odynophagia. The EGD noted gastritis/superficial 


 ulceration of the antrum, esophagitis in the distal esophagus, and food bolus 

in the stomach. 


- Pt was planned for outpt evaluation with colonoscopy which had not been 

performed yet. 


- Pt has family hx of colon cancer, father. 


- She has received 2 units of PRBCs at admission with H/H improving to Hgb 9.1/

Hct 26.9 (3/8/18)


- GI has been consulted


- Monitor H/H 


- PPI


- Supportive care





(2) Fever


ICD Codes:  R50.9 - Fever, unspecified


Status:  Acute


Plan:  


- Pt has had reported night sweats and was noted to have fevers prior to 

admission, Tmax 103 degrees


- Blood cultures taken in the ED are pending


- UA was negative


- Tylenol PRN





(3) Weight loss, unintentional


ICD Codes:  R63.4 - Abnormal weight loss


Plan:  


- Pt had reported unintentional weight loss ~20lbs since 1/2018, night sweats 

and had outpt CT Chest/Abd/Pelvis on 3/2/18


- Outpt Chest CT W/W/O IV contrast (3/2/18):


   - Innumerable masses scattered throughout the lungs. Numerous lymph nodes 

throughout the mediastinum and both hilar regions





- Outpt CT Abd/pelvis W/W/O contrast (3/2/18):


   - There is widespread metastatic disease throughout the spleen, the liver 

and the retroperitoneum. Lymphoma most likely diagnosis


    versus conceivably melanoma or breast cancer. 


   - Indeterminate sclerotic lesion in T12. 


   - Liver with 2.7cm low-density lesion in the lateral segment of the left 

lobe of the liver. At least 5 other low-density lesions scattered


    throughout the liver concerning for metastatic disease. 


   - Spleen is markedly heterogenous appearance with multiple masses. 


   - Retroperitoneum with 2.2cm left periaortic lymph node below the left renal 

vein. At least 3 other retroperitoneal lymph nodes are present. 


   - 3.0 x 1.6cm right inguinal lymph node. Some internal obturator 

lymphadenopathy on the right as well. 





- Pt underwent CT guided liver biopsy and US guided LN biopsy on 3/7/18. 

Pathology is pending. 


- Check AFP, CA-19-9, CEA, Ca 125


- Check TSH, Free T4


- She denies ever having had a mammogram previously, no reported obvious breast 

masses. She does not perform self-breast exams regularly


- She does get pap smears annually and reports hx of an abnormal PAP 3 years 

ago and underwent a LEEP procedure at that time. Her last PAP


 was in 2017 and she reports that this was normal. 


- She has family hx of colon cancer, father diagnosed in his early 70s





(4) Liver mass


ICD Codes:  R16.0 - Hepatomegaly, not elsewhere classified


Plan:  


- See above


- Pathology pending. 





(5) Lymph node enlargement


ICD Codes:  R59.9 - Enlarged lymph nodes, unspecified


Plan:  


- See above. 


- Pathology pending





Assessment and Plan


Patient examined.


Assessment and plan formulated with Renée Dykes PA-C.


I agree with the above.


concern for metastatic dz process.  unknown primary.


outpt ct imaging reviewed. f/u liver and ln bx results. call path


lab and hopefully prelim tomorrow. f/u spine imaging as she


has alot of complaints


she is refusing colonoscopy prep.


try to get basal pain meds started. bp will be limiting.











Renée Dykes Mar 8, 2018 08:21


Pedro Le MD Mar 8, 2018 16:18

## 2018-03-08 NOTE — RADRPT
EXAM DATE/TIME:  03/08/2018 17:55 

 

[facility] COMPARISON:     

No previous studies available for comparison.

       

 

 

INDICATIONS :     

Mass. Back pain.

                     

 

CONTRAST:     

11 cc Omniscan (gadodiamide) IV

                     

 

MEDICAL HISTORY :     

None.     

 

SURGICAL HISTORY :           

Ovarian cyst removal.

 

ENCOUNTER:     

Initial

 

ACUITY:     

3 day

 

PAIN SCORE:     

6/10

 

LOCATION:         

Back.

 

TECHNIQUE:     

Multiplanar multisequence MRI of the lumbar spine was performed with and without contrast.

 

FINDINGS:     

The most caudal appearing lumbar vertebra is numbered as L5.  Vertebral body height is maintained the
 vertebral bodies are in normal alignment.  There is some scattered areas of T2 prolongation in the m
arrow of the anterior inferior L1 and L2 vertebral body and on the postcontrast study, there is some 
enhancement in the anterior inferior L1 vertebral body.  Interspace height is maintained.  The conus 
is at the level of T12-L1.  No abnormal areas of enhancement within the thecal sac.

 

T12-L1:  

The thecal sac has a normal diameter.  No evidence of disc bulge or protrusion.  The neural foramina 
are patent bilaterally.

 

L1-L2:  

The thecal sac has a normal diameter.  No evidence of disc bulge or protrusion.  The neural foramina 
are patent bilaterally.

 

L2-L3:  

The thecal sac has a normal diameter.  No evidence of disc bulge or protrusion.  The neural foramina 
are patent bilaterally.

 

L3-L4:  

The thecal sac has a normal diameter.  No evidence of disc bulge or protrusion.  The neural foramina 
are patent bilaterally.

 

L4-L5:  

The thecal sac has a normal diameter.  No evidence of disc bulge or protrusion.  The neural foramina 
are patent bilaterally.

 

L5-S1:  

The thecal sac has a normal diameter.  No evidence of disc bulge or protrusion.  The neural foramina 
are patent bilaterally.

 

CONCLUSION:     

1. No evidence of disc bulge or protrusion.

2. Triangular shaped area of T2 prolongation in the anterior inferior L1 vertebral body with correlat
ing enhancement on the postcontrast study.  This of uncertain significance, but would be an unusual s
hape and location for a metastatic lesion.

 

 

 

 Roby Matos MD on March 08, 2018 at 19:45           

Board Certified Radiologist.

 This report was verified electronically.

## 2018-03-08 NOTE — RADRPT
EXAM DATE/TIME:  03/08/2018 17:55 

 

[facility] COMPARISON:     

No previous studies available for comparison.

       

 

 

INDICATIONS :     

Mass. Back pain.

                     

 

CONTRAST:     

11 cc Omniscan (gadodiamide) IV

                     

 

MEDICAL HISTORY :     

None.     

 

SURGICAL HISTORY :           

Ovarian cyst removal.

 

ENCOUNTER:     

Initial

 

ACUITY:     

3 day

 

PAIN SCORE:     

5/10

 

LOCATION:         

Back.

 

TECHNIQUE:     

Multiplanar multisequence MRI of the thoracic spine was performed.

 

FINDINGS:     

Vertebral body height is maintained.  There are signal abnormalities in several thoracic vertebral mirna
dies including the T3, T4, and T8.  At T3, there is mild T2 prolongation anteriorly with some periphe
ral enhancement seen at T4, there is T2 prolongation in the posterior vertebral body with correlating
 contrast enhancement both in the vertebral body and right pedicle.  At T8, there is no T2 prolongati
on or abnormal enhancement, but there is marked T1 prolongation of the vertebral bodies.  At T10-T11,
 there are some focal areas of fatty replacement of the marrow.

 

The thoracic cord has normal size and signal characteristics.  The conus is at the level of L1.

 

CONCLUSION:     

Signal abnormalities within the T3 and T4 vertebral body and right T4 pedicle with contrast enhanceme
nt.  There is also T1 prolongation without contrast enhancement in the T8 vertebral body.  The appear
ance is nonspecific but is abnormal and suggests possible metastatic disease.

 

 

 

 Roby Matos MD on March 08, 2018 at 19:20           

Board Certified Radiologist.

 This report was verified electronically.

## 2018-03-08 NOTE — RADRPT
EXAM DATE/TIME:  03/08/2018 15:26 

 

[facility] COMPARISON:     

No previous studies available for comparison.

 

                     

INDICATIONS :     

Abdominal pain, constipation.

                     

 

MEDICAL HISTORY :            

ovarian cysts, gastritis, reflux, ocarian cysts.   

 

SURGICAL HISTORY :        

biopsy liver, ovarian cysts removed

 

ENCOUNTER:     

Initial                                        

 

ACUITY:     

1 day      

 

PAIN SCORE:     

9/10

 

LOCATION:     

Bilateral  abdomen

 

FINDINGS:     

Supine view of the abdomen was performed.  The abdominal bowel gas pattern is normal.  No definite ca
lcifications overlying the kidneys..  There is some residual contrast seen within the left colon. The
re is stool throughout the colon. The lung bases are grossly clear. The osseous structures are unrema
rkable.

 

CONCLUSION:     

Moderate stool throughout the colon. Otherwise, benign-appearing abdomen.

 

 

 

 Enrico Russell MD on March 08, 2018 at 15:37           

Board Certified Radiologist.

 This report was verified electronically.

## 2018-03-08 NOTE — PD.CONS
HPI


History of Present Illness


This is a 38 year old female who presented to ER after being told by PCP for 

abnormal labwork.  She is s/p liver mass and inguinal lymph node biopsy 3/7/18.

  GI has been consulted for anemia, poss liver mass, heme pos stool.  She was 

scheduled to have EGD and colonoscopy at [facility] but had fever of 104 and now has 

appt 3/20/18 for those procedures.  Admits intermittently dark stool.  she 

takes iron. She admits intermittent lower abd pain associated with 

constipation. She has lost 20 lbs in 3-4 months. The weight loss started 

December when she was sick with PNA.  Denies cedric blood in stool, n/v, 

diarrhea.  She had EGD and dilatation 2/13/17 and finding of food bolus, reflux 

esophagitis, gastritis.  denies hx liver problems, GIB.  Not on blood thinners. 

Denies frequent NSAID use.  Admits occasional consumption ETOH but in large 

amounts when she does drink.  She had CT done at  that found lesions liver, 

lungs, spine.   


 (Kajal De La Vega)





PFSH


Past Medical History


migraines


Past Surgical History


liver bx, lymph node bx


ovarian cyst removal


 (Kajal De La Vega)


Coded Allergies:  


     No Known Allergies (Verified  Adverse Reaction, Unknown, 1/3/18)


Family History


DM


Social History


+ ETOH, heavy at times


former smoker, quit 3 y ago


denies illicit drug use currently, per EMR has admitted to cocaine use


denies IVDU


 (Kajal De La Vega)





Review of Systems


Constitutional:  COMPLAINS OF: Fever, Weight loss


Endocrine:  DENIES: Polydipsia


Eyes:  DENIES: Blurred vision


Ears, nose, mouth, throat:  DENIES: Hearing loss


Respiratory:  DENIES: Hemoptysis


Cardiovascular:  DENIES: Chest pain


Gastrointestinal:  COMPLAINS OF: Abdominal pain, Black stools, Constipation, 

DENIES: Bloody stools, Diarrhea, Nausea, Vomiting, Difficulty Swallowing


Genitourinary:  DENIES: Hematuria


Musculoskeletal:  DENIES: Joint Swelling


Integumentary:  DENIES: Abnormal pigmentation


Hematologic/lymphatic:  COMPLAINS OF: Bruising


Immunologic/allergic:  DENIES: Eczema


Neurologic:  DENIES: Abnormal gait


Psychiatric:  DENIES: Confusion (Kajal De La Vega)





GI Exam


Vitals I&O





Vital Signs








  Date Time  Temp Pulse Resp B/P (MAP) Pulse Ox O2 Delivery O2 Flow Rate FiO2


 


3/8/18 08:22 97.6 84 18 100/57 (71) 98   


 


3/8/18 05:55 98.7       


 


3/8/18 05:07 103.0       


 


3/8/18 03:43 98.3 137 16 111/63 (79) 99   


 


3/8/18 03:06 98.4 102 16 114/64 100   


 


3/8/18 02:33 98.8 90 16 114/60 100   


 


3/7/18 23:54 97.3 94 16 93/53 (66) 97   


 


3/7/18 21:37 98.5 94 16 91/54 (66) 97   


 


3/7/18 20:55 98.7 100 16 104/62 99   


 


3/7/18 20:01  91 16 95/52 (66) 99 Room Air  


 


3/7/18 17:30     98   


 


3/7/18 16:36 98.2 90 18 90/54 (66) 100   














I/O      


 


 3/7/18 3/7/18 3/7/18 3/8/18 3/8/18 3/8/18





 07:00 15:00 23:00 07:00 15:00 23:00


 


Intake Total   1020 ml 800 ml 1200 ml 


 


Balance   1020 ml 800 ml 1200 ml 


 


      


 


Intake Oral     1200 ml 


 


IV Total   1000 ml   


 


Packed Cells    800 ml  


 


Blood Product IV Normal Saline Flush   20 ml   


 


# Voids     3 








Imaging





Last Impressions








Hip and Pelvis X-Ray 3/7/18 7075 Signed





Impressions: 





 Service Date/Time:  Wednesday, March 7, 2018 19:11 - CONCLUSION:  Negative 





 evaluation of the left hip.     Roby Matos MD 








Laboratory











Test


  3/7/18


17:25 3/8/18


06:40


 


White Blood Count 11.6 TH/MM3  9.1 TH/MM3 


 


Red Blood Count 3.04 MIL/MM3  3.51 MIL/MM3 


 


Hemoglobin 7.4 GM/DL  9.1 GM/DL 


 


Hematocrit 22.9 %  26.8 % 


 


Mean Corpuscular Volume 75.4 FL  76.3 FL 


 


Mean Corpuscular Hemoglobin 24.4 PG  26.0 PG 


 


Mean Corpuscular Hemoglobin


Concent 32.4 % 


  34.0 % 


 


 


Red Cell Distribution Width 19.1 %  18.8 % 


 


Platelet Count 625 TH/MM3  524 TH/MM3 


 


Mean Platelet Volume 6.7 FL  6.8 FL 


 


Neutrophils (%) (Auto) 85.9 %  83.7 % 


 


Lymphocytes (%) (Auto) 5.9 %  6.5 % 


 


Monocytes (%) (Auto) 7.2 %  7.8 % 


 


Eosinophils (%) (Auto) 0.7 %  1.8 % 


 


Basophils (%) (Auto) 0.3 %  0.2 % 


 


Neutrophils # (Auto) 10.0 TH/MM3  7.6 TH/MM3 


 


Lymphocytes # (Auto) 0.7 TH/MM3  0.6 TH/MM3 


 


Monocytes # (Auto) 0.8 TH/MM3  0.7 TH/MM3 


 


Eosinophils # (Auto) 0.1 TH/MM3  0.2 TH/MM3 


 


Basophils # (Auto) 0.0 TH/MM3  0.0 TH/MM3 


 


CBC Comment DIFF FINAL  DIFF FINAL 


 


Differential Comment    


 


Prothrombin Time 10.6 SEC  


 


Prothromb Time International


Ratio 1.0 RATIO 


  


 


 


Activated Partial


Thromboplast Time 45.5 SEC 


  


 


 


Urine Color LIGHT-YELLOW  


 


Urine Turbidity CLEAR  


 


Urine pH 6.0  


 


Urine Specific Gravity 1.001  


 


Urine Protein NEG mg/dL  


 


Urine Glucose (UA) NEG mg/dL  


 


Urine Ketones NEG mg/dL  


 


Urine Occult Blood NEG  


 


Urine Nitrite NEG  


 


Urine Bilirubin NEG  


 


Urine Urobilinogen


  LESS THAN 2.0


MG/DL 


 


 


Urine Leukocyte Esterase NEG  


 


Urine WBC


  LESS THAN 1


/hpf 


 


 


Microscopic Urinalysis Comment


  CULT NOT


INDICATED 


 


 


Blood Urea Nitrogen 9 MG/DL  9 MG/DL 


 


Creatinine 0.79 MG/DL  0.62 MG/DL 


 


Random Glucose 67 MG/DL  80 MG/DL 


 


Total Protein 8.5 GM/DL  7.1 GM/DL 


 


Albumin 2.8 GM/DL  2.3 GM/DL 


 


Calcium Level 8.9 MG/DL  8.1 MG/DL 


 


Alkaline Phosphatase 220 U/L  167 U/L 


 


Aspartate Amino Transf


(AST/SGOT) 21 U/L 


  20 U/L 


 


 


Alanine Aminotransferase


(ALT/SGPT) 26 U/L 


  19 U/L 


 


 


Total Bilirubin 0.4 MG/DL  1.2 MG/DL 


 


Sodium Level 137 MEQ/L  136 MEQ/L 


 


Potassium Level 3.4 MEQ/L  3.6 MEQ/L 


 


Chloride Level 101 MEQ/L  102 MEQ/L 


 


Carbon Dioxide Level 27.4 MEQ/L  24.9 MEQ/L 


 


Anion Gap 9 MEQ/L  9 MEQ/L 


 


Estimat Glomerular Filtration


Rate 81 ML/MIN 


  107 ML/MIN 


 


 


Urine Opiates Screen NEG  


 


Urine Barbiturates Screen NEG  


 


Urine Amphetamines Screen NEG  


 


Urine Benzodiazepines Screen POS  


 


Urine Cocaine Screen NEG  


 


Urine Cannabinoids Screen NEG  


 


Ethyl Alcohol Level


  LESS THAN 3


MG/DL 


 














 Date/Time


Source Procedure


Growth Status


 


 


 3/8/18 06:40


Blood Other Aerobic Blood Culture


Pending Received


 


 3/8/18 06:40


Blood Other Anaerobic Blood Culture


Pending Received








Physical Examination


HEENT: PERRL; normocephalic; atraumatic; no jaundice.   


CHEST:  CTA


CARDIAC:  tachy


ABDOMEN:  Semifirm, nondistended, TTP region liver bx; no hepatosplenomegaly; 

bowel sounds are present in all four quadrants.


EXTREMITIES: No clubbing, cyanosis, or edema.


SKIN:  Normal; no rash; no jaundice.


CNS:  No focal deficits; alert and oriented times three.


 (Kajal De La Vega)





Assessment and Plan


Plan


ASSESSMENT


- anemia, heme pos stool - could be multifactoria, GIB vs HOMA.  microcytic.  

hgb 7.4 on admission, admits dark stools, on iron


- abnormal imaging, liver mass - reportedly seen on CT done recently at [facility], had bx yesterday.  tumor markers pending





PLAN


- EGD and colonoscopy in am


- obtain consent


- clears today


- NPO after midnight


- mg citrate x 2 + dulcolax


- monitor labs


- liver w/u


- further recs as case unfolds





pt seen by myself and Dr Patten and this note is on his behalf


 (Kajal De La Vega)


Physician Comments


Seen and examined, plan for EGD and Colonoscopy.


Thank you for the consult.


 (Cheri Patten MD)











Kajal De La Vega Mar 8, 2018 10:31


Cheri Patten MD Mar 9, 2018 06:08

## 2018-03-09 VITALS
HEART RATE: 129 BPM | RESPIRATION RATE: 22 BRPM | TEMPERATURE: 100.6 F | DIASTOLIC BLOOD PRESSURE: 65 MMHG | OXYGEN SATURATION: 94 % | SYSTOLIC BLOOD PRESSURE: 117 MMHG

## 2018-03-09 VITALS
SYSTOLIC BLOOD PRESSURE: 100 MMHG | OXYGEN SATURATION: 96 % | HEART RATE: 99 BPM | RESPIRATION RATE: 20 BRPM | TEMPERATURE: 99.6 F | DIASTOLIC BLOOD PRESSURE: 60 MMHG

## 2018-03-09 VITALS
RESPIRATION RATE: 18 BRPM | HEART RATE: 98 BPM | OXYGEN SATURATION: 100 % | TEMPERATURE: 98.4 F | SYSTOLIC BLOOD PRESSURE: 108 MMHG | DIASTOLIC BLOOD PRESSURE: 64 MMHG

## 2018-03-09 VITALS
DIASTOLIC BLOOD PRESSURE: 57 MMHG | TEMPERATURE: 97.8 F | SYSTOLIC BLOOD PRESSURE: 93 MMHG | RESPIRATION RATE: 18 BRPM | OXYGEN SATURATION: 95 % | HEART RATE: 88 BPM

## 2018-03-09 VITALS
DIASTOLIC BLOOD PRESSURE: 55 MMHG | TEMPERATURE: 104 F | HEART RATE: 56 BPM | SYSTOLIC BLOOD PRESSURE: 90 MMHG | OXYGEN SATURATION: 97 % | RESPIRATION RATE: 18 BRPM

## 2018-03-09 VITALS
OXYGEN SATURATION: 98 % | TEMPERATURE: 99.4 F | HEART RATE: 91 BPM | DIASTOLIC BLOOD PRESSURE: 60 MMHG | SYSTOLIC BLOOD PRESSURE: 98 MMHG

## 2018-03-09 VITALS
HEART RATE: 119 BPM | TEMPERATURE: 102.7 F | SYSTOLIC BLOOD PRESSURE: 88 MMHG | OXYGEN SATURATION: 93 % | RESPIRATION RATE: 16 BRPM | DIASTOLIC BLOOD PRESSURE: 50 MMHG

## 2018-03-09 VITALS
RESPIRATION RATE: 16 BRPM | SYSTOLIC BLOOD PRESSURE: 94 MMHG | HEART RATE: 96 BPM | TEMPERATURE: 96.6 F | OXYGEN SATURATION: 95 % | DIASTOLIC BLOOD PRESSURE: 59 MMHG

## 2018-03-09 VITALS — TEMPERATURE: 100.4 F

## 2018-03-09 LAB
% SATURATION IRON PROFILE: 3.4 % (ref 20–50)
% SATURATION IRON PROFILE: 5.1 % (ref 20–50)
BASOPHILS # BLD AUTO: 0 TH/MM3 (ref 0–0.2)
BASOPHILS NFR BLD: 0.3 % (ref 0–2)
EOSINOPHIL # BLD: 0.1 TH/MM3 (ref 0–0.4)
EOSINOPHIL NFR BLD: 0.9 % (ref 0–4)
ERYTHROCYTE [DISTWIDTH] IN BLOOD BY AUTOMATED COUNT: 18.6 % (ref 11.6–17.2)
FERRITIN SERPL-MCNC: 234 NG/ML (ref 8–252)
FERRITIN SERPL-MCNC: 384 NG/ML (ref 8–252)
FOLATE SERPL-MCNC: 13.8 NG/ML (ref 3.1–17.5)
HCT VFR BLD CALC: 26.3 % (ref 35–46)
HEPATITIS A AB IGM: NEGATIVE
HEPATITIS B CORE AB IGM: NEGATIVE
HGB BLD-MCNC: 8.9 GM/DL (ref 11.6–15.3)
IRON (FE): 10 MCG/DL (ref 50–170)
IRON (FE): 12 MCG/DL (ref 50–170)
LYMPHOCYTES # BLD AUTO: 0.6 TH/MM3 (ref 1–4.8)
LYMPHOCYTES NFR BLD AUTO: 5.9 % (ref 9–44)
MCH RBC QN AUTO: 25.9 PG (ref 27–34)
MCHC RBC AUTO-ENTMCNC: 33.7 % (ref 32–36)
MCV RBC AUTO: 76.9 FL (ref 80–100)
MONOCYTE #: 0.5 TH/MM3 (ref 0–0.9)
MONOCYTES NFR BLD: 5.4 % (ref 0–8)
NEUTROPHILS # BLD AUTO: 8.3 TH/MM3 (ref 1.8–7.7)
NEUTROPHILS NFR BLD AUTO: 87.5 % (ref 16–70)
PLATELET # BLD: 591 TH/MM3 (ref 150–450)
PMV BLD AUTO: 6.8 FL (ref 7–11)
RBC # BLD AUTO: 3.42 MIL/MM3 (ref 4–5.3)
TIBC SERPL-MCNC: 235 MCG/DL (ref 250–450)
TIBC SERPL-MCNC: 293 MCG/DL (ref 250–450)
VIT B12 SERPL-MCNC: 1166 PG/ML (ref 193–986)
WBC # BLD AUTO: 9.5 TH/MM3 (ref 4–11)

## 2018-03-09 RX ADMIN — HYDROMORPHONE HYDROCHLORIDE PRN MG: 2 INJECTION INTRAMUSCULAR; INTRAVENOUS; SUBCUTANEOUS at 23:57

## 2018-03-09 RX ADMIN — THIAMINE HYDROCHLORIDE SCH MLS/HR: 100 INJECTION, SOLUTION INTRAMUSCULAR; INTRAVENOUS at 05:42

## 2018-03-09 RX ADMIN — HYDROMORPHONE HYDROCHLORIDE PRN MG: 2 INJECTION INTRAMUSCULAR; INTRAVENOUS; SUBCUTANEOUS at 20:41

## 2018-03-09 RX ADMIN — ACETAMINOPHEN PRN MG: 325 TABLET ORAL at 23:50

## 2018-03-09 RX ADMIN — MORPHINE SULFATE SCH MG: 15 TABLET, EXTENDED RELEASE ORAL at 18:31

## 2018-03-09 RX ADMIN — ACETAMINOPHEN PRN MG: 325 TABLET ORAL at 04:59

## 2018-03-09 RX ADMIN — MORPHINE SULFATE SCH MG: 15 TABLET, EXTENDED RELEASE ORAL at 10:23

## 2018-03-09 RX ADMIN — Medication SCH ML: at 09:00

## 2018-03-09 RX ADMIN — MORPHINE SULFATE PRN MG: 2 INJECTION, SOLUTION INTRAMUSCULAR; INTRAVENOUS at 05:39

## 2018-03-09 RX ADMIN — STANDARDIZED SENNA CONCENTRATE AND DOCUSATE SODIUM SCH TAB: 8.6; 5 TABLET, FILM COATED ORAL at 09:00

## 2018-03-09 RX ADMIN — STANDARDIZED SENNA CONCENTRATE AND DOCUSATE SODIUM SCH TAB: 8.6; 5 TABLET, FILM COATED ORAL at 20:43

## 2018-03-09 RX ADMIN — Medication SCH ML: at 20:43

## 2018-03-09 RX ADMIN — THIAMINE HYDROCHLORIDE SCH MLS/HR: 100 INJECTION, SOLUTION INTRAMUSCULAR; INTRAVENOUS at 21:45

## 2018-03-09 RX ADMIN — MORPHINE SULFATE PRN MG: 2 INJECTION, SOLUTION INTRAMUSCULAR; INTRAVENOUS at 02:23

## 2018-03-09 RX ADMIN — MORPHINE SULFATE PRN MG: 2 INJECTION, SOLUTION INTRAMUSCULAR; INTRAVENOUS at 11:55

## 2018-03-09 RX ADMIN — ACETAMINOPHEN PRN MG: 325 TABLET ORAL at 14:45

## 2018-03-09 RX ADMIN — THIAMINE HYDROCHLORIDE SCH MLS/HR: 100 INJECTION, SOLUTION INTRAMUSCULAR; INTRAVENOUS at 13:06

## 2018-03-09 NOTE — GIPROC
[facility]

[address] [facility]. [facility], [address]

 

 

EGD PROCEDURE REPORT     EXAM DATE: 03/09/2018

 

PATIENT NAME:      Annette Lopez           MR #:      [id number]

YOB: 1980      VISIT #:     [id number]

ATTENDING:     Cheri Patten MD     ORDER #:     [id number]

ASSISTANT:      Gaby Greenwood and Shavon Degroot     STATUS:     inpatient

 

INDICATIONS:  The patient is a 38 yr old female here for an EGD due to anemia

 

PROCEDURE PERFORMED:     EGD, diagnostic

MEDICATIONS:     Per Anesthesia.

TOPICAL ANESTHETIC:     none

 

CONSENT: The patient understands the risks and benefits of the procedure and

understands that these risks include, but are not limited to: sedation,

allergic reaction, infection, perforation and/or bleeding. Alternative means of

evaluation and treatment include, among others: physical exam, x-rays, and/or

surgical intervention. The patient elects to proceed with this endoscopic

procedure.

 



medical equipment was checked for proper function. Hand hygiene and appropriate

measures for infection prevention was taken. After the risks, benefits and

alternatives of the procedure were thoroughly explained, Informed consent was

verified, confirmed and timeout was successfully executed by the treatment

team. The patient was anesthetized with topical anesthesia and the Pentax

[id number] endoscope was introduced through the mouth and advanced to the second

portion of the duodenum.  Retroflexion was performed and was normal  The

gastroscope was then slowly withdrawn and removed.

The endoscopy was otherwise normal.

 

 

 

ADVERSE EVENTS:     There were no complications.

IMPRESSIONS:     1.  Normal endoscopy otherwise

2.  Retroflexion was performed and was normal

 

RECOMMENDATIONS:     Colonoscopy

PATIENT CONDITION:     stable

DISPOSITION:     Observation

REPEAT EXAM:     NONE

 

 

___________________________________

Cheri Patten MD

eSigned:  Cheri Patten MD 03/09/2018 9:26 AM

 

 

cc:

 

 

 

 

PATIENT NAME:  Annette Lopez

MR#: [id number]

## 2018-03-09 NOTE — HHI.PR
Subjective


Remarks


Pt still having a lot of pain in her tailbone


She is still having fevers, last night fever of 103





Objective


Vitals





Vital Signs








  Date Time  Temp Pulse Resp B/P (MAP) Pulse Ox O2 Delivery O2 Flow Rate FiO2


 


3/9/18 09:31 97.9 89 18 91/46 (61) 97   


 


3/9/18 05:43 100.4       


 


3/9/18 04:48 104.0 56 18 90/55 (67) 97   


 


3/9/18 00:11 98.4 98 18 108/64 (79) 100   


 


3/8/18 22:06 99.1 89 18 101/53 (69) 98   


 


3/8/18 16:35 98.5 96 16 107/62 (77) 100   


 


3/8/18 12:20 98.6 106 18 92/54 (67) 96   














 3/9/18 3/9/18 3/10/18





 15:00 23:00 07:00


 


Intake Total 400 ml  


 


Balance 400 ml  


 


   


 


Other 400 ml  








Result Diagram:  


3/8/18 0640                                                                    

            3/8/18 0640





Other Results





 Laboratory Tests








Test


  3/7/18


17:25 3/8/18


06:40 3/8/18


11:20


 


White Blood Count 11.6 TH/MM3  9.1 TH/MM3  


 


Red Blood Count 3.04 MIL/MM3  3.51 MIL/MM3  


 


Hemoglobin 7.4 GM/DL  9.1 GM/DL  


 


Hematocrit 22.9 %  26.8 %  


 


Mean Corpuscular Volume 75.4 FL  76.3 FL  


 


Mean Corpuscular Hemoglobin 24.4 PG  26.0 PG  


 


Mean Corpuscular Hemoglobin


Concent 32.4 % 


  34.0 % 


  


 


 


Red Cell Distribution Width 19.1 %  18.8 %  


 


Platelet Count 625 TH/MM3  524 TH/MM3  


 


Mean Platelet Volume 6.7 FL  6.8 FL  


 


Neutrophils (%) (Auto) 85.9 %  83.7 %  


 


Lymphocytes (%) (Auto) 5.9 %  6.5 %  


 


Monocytes (%) (Auto) 7.2 %  7.8 %  


 


Eosinophils (%) (Auto) 0.7 %  1.8 %  


 


Basophils (%) (Auto) 0.3 %  0.2 %  


 


Neutrophils # (Auto) 10.0 TH/MM3  7.6 TH/MM3  


 


Lymphocytes # (Auto) 0.7 TH/MM3  0.6 TH/MM3  


 


Monocytes # (Auto) 0.8 TH/MM3  0.7 TH/MM3  


 


Eosinophils # (Auto) 0.1 TH/MM3  0.2 TH/MM3  


 


Basophils # (Auto) 0.0 TH/MM3  0.0 TH/MM3  


 


CBC Comment DIFF FINAL  DIFF FINAL  


 


Differential Comment     


 


Prothrombin Time 10.6 SEC   


 


Prothromb Time International


Ratio 1.0 RATIO 


  


  


 


 


Activated Partial


Thromboplast Time 45.5 SEC 


  


  


 


 


Urine Color LIGHT-YELLOW   


 


Urine Turbidity CLEAR   


 


Urine pH 6.0   


 


Urine Specific Gravity 1.001   


 


Urine Protein NEG mg/dL   


 


Urine Glucose (UA) NEG mg/dL   


 


Urine Ketones NEG mg/dL   


 


Urine Occult Blood NEG   


 


Urine Nitrite NEG   


 


Urine Bilirubin NEG   


 


Urine Urobilinogen


  LESS THAN 2.0


MG/DL 


  


 


 


Urine Leukocyte Esterase NEG   


 


Urine WBC


  LESS THAN 1


/hpf 


  


 


 


Microscopic Urinalysis Comment


  CULT NOT


INDICATED 


  


 


 


Blood Urea Nitrogen 9 MG/DL  9 MG/DL  


 


Creatinine 0.79 MG/DL  0.62 MG/DL  


 


Random Glucose 67 MG/DL  80 MG/DL  


 


Total Protein 8.5 GM/DL  7.1 GM/DL  


 


Albumin 2.8 GM/DL  2.3 GM/DL  


 


Calcium Level 8.9 MG/DL  8.1 MG/DL  


 


Alkaline Phosphatase 220 U/L  167 U/L  


 


Aspartate Amino Transf


(AST/SGOT) 21 U/L 


  20 U/L 


  


 


 


Alanine Aminotransferase


(ALT/SGPT) 26 U/L 


  19 U/L 


  


 


 


Total Bilirubin 0.4 MG/DL  1.2 MG/DL  


 


Sodium Level 137 MEQ/L  136 MEQ/L  


 


Potassium Level 3.4 MEQ/L  3.6 MEQ/L  


 


Chloride Level 101 MEQ/L  102 MEQ/L  


 


Carbon Dioxide Level 27.4 MEQ/L  24.9 MEQ/L  


 


Anion Gap 9 MEQ/L  9 MEQ/L  


 


Estimat Glomerular Filtration


Rate 81 ML/MIN 


  107 ML/MIN 


  


 


 


Urine Opiates Screen NEG   


 


Urine Barbiturates Screen NEG   


 


Urine Amphetamines Screen NEG   


 


Urine Benzodiazepines Screen POS   


 


Urine Cocaine Screen NEG   


 


Urine Cannabinoids Screen NEG   


 


Ethyl Alcohol Level


  LESS THAN 3


MG/DL 


  


 


 


Tumor Marker Alpha Fetoprotein   0.9 NG/ML 


 


Carcinoembryonic Antigen   0.4 NG/ML 


 


CA 19-9 Antigen   7.0 U/ML 


 


 Antigen   10.4 U/ML 


 


Free Thyroxine   1.25 NG/DL 


 


Thyroid Stimulating Hormone


3rd Gen 


  


  3.720 uIU/ML 


 








Imaging





Last Impressions








Thoracic Spine MRI 3/8/18 0000 Signed





Impressions: 





 Service Date/Time:  Thursday, March 8, 2018 17:55 - CONCLUSION:  Signal 





 abnormalities within the T3 and T4 vertebral body and right T4 pedicle with 





 contrast enhancement.  There is also T1 prolongation without contrast 





 enhancement in the T8 vertebral body.  The appearance is nonspecific but is 





 abnormal and suggests possible metastatic disease.     Roby Matos MD 


 


Sacrum/Coccyx MRI 3/8/18 0000 Signed





Impressions: 





 Service Date/Time:  Thursday, March 8, 2018 17:55 - CONCLUSION:  There are 





 signal abnormalities in the superior S2 and bilateral sacroiliac with T2 





 prolongation and contrast enhancement.  This suggests the possibility of 





 metastatic lesions.     Roby Matos MD 


 


Lumbar Spine MRI 3/8/18 0000 Signed





Impressions: 





 Service Date/Time:  Thursday, March 8, 2018 17:55 - CONCLUSION:  1. No 

evidence 





 of disc bulge or protrusion. 2. Triangular shaped area of T2 prolongation in 

the 





 anterior inferior L1 vertebral body with correlating enhancement on the 





 postcontrast study.  This of uncertain significance, but would be an unusual 





 shape and location for a metastatic lesion.     Roby Matos MD 


 


Abdomen X-Ray 3/8/18 0000 Signed





Impressions: 





 Service Date/Time:  Thursday, March 8, 2018 15:26 - CONCLUSION:  Moderate 

stool 





 throughout the colon. Otherwise, benign-appearing abdomen.     Enrico Russell MD 


 


Hip and Pelvis X-Ray 3/7/18 0894 Signed





Impressions: 





 Service Date/Time:  Wednesday, March 7, 2018 19:11 - CONCLUSION:  Negative 





 evaluation of the left hip.     Roby Matos MD 











Outpt Chest CT W/W/O IV contrast (3/2/18):


- Innumerable masses scattered throughout the lungs. Numerous lymph nodes 

throughout the mediastinum and both hilar regions





Outpt CT Abd/pelvis W/W/O contrast (3/2/18):


- There is widespread metastatic disease throughout the spleen, the liver and 

the retroperitoneum. Lymphoma most likely diagnosis versus conceivably melanoma 

or breast cancer. 


- Indeterminate sclerotic lesion in T12. 


- Liver with 2.7cm low-density lesion in the lateral segment of the left lobe 

of the liver. At least 5 other low-density lesions scattered throughout the 

liver concerning for metastatic disease. 


- Spleen is markedly heterogenous appearance with multiple masses. 


- Retroperitoneum with 2.2cm left periaortic lymph node below the left renal 

vein. At least 3 other retroperitoneal lymph nodes are present. 


- 3.0 x 1.6cm right inguinal lymph node. Some internal obturator 

lymphadenopathy on the right as well.


Objective Remarks


General: Thin female in no acute distress


Chest: CTA


Cardiac: Regular


Abd: +BS, soft nondistended, tenderness to palpation over the biopsy sites but 

no evidence of 


 drainage or erythema or swelling at the sites


Ext: No edema





A/P


Problem List:  


(1) Symptomatic anemia


ICD Codes:  D64.9 - Anemia, unspecified


Plan:  


- Pt is a 39 y/o female who was sent to the ED by her PCP for worsening anemia 

and leukocytosis on outpt labs. 


- Pt reported various complaints at the time of admission, including weight 

loss (Approx 20lbs in the last 3 months), intermittent dizziness, 


 fevers, "bone pain", night sweats. 


- Pts outpt H/H has decreased to Hgb 7.4/Hct 22.8, MCV 77, MCH 25 on 3/6/18 and 

was recommended to report to the ED for further eval. 


- Pt had previously reported intermittent black stools and was noted to be 

Hemoccult positive in the ED. 


- She was seen by GI in 2/2017 and had an EGD with dilation for complaints of 

dysphagia and odynophagia. The EGD noted gastritis/superficial 


 ulceration of the antrum, esophagitis in the distal esophagus, and food bolus 

in the stomach. 


- Pt was planned for outpt evaluation with colonoscopy which had not been 

performed yet. 


- Pt has family hx of colon cancer, father. 


- She has received 2 units of PRBCs at admission with H/H improving to Hgb 9.1/

Hct 26.9 (3/8/18)


- GI following


- Pt had EGD today which was negative. 


- Pt refused the colonoscopy yesterday


- Monitor H/H 


- PPI


- Supportive care





(2) Fever


ICD Codes:  R50.9 - Fever, unspecified


Status:  Acute


Plan:  


- Pt has had reported night sweats and was noted to have fevers prior to 

admission, Tmax 103 degrees


- Blood cultures taken in the ED with NGTD


- UA was negative


- May be tumor fever or related to malignancy, no obvious signs of infection


- Tylenol PRN





(3) Weight loss, unintentional


ICD Codes:  R63.4 - Abnormal weight loss


Plan:  


- Pt had reported unintentional weight loss ~20lbs since 1/2018, night sweats 

and had outpt CT Chest/Abd/Pelvis on 3/2/18


- Outpt Chest CT W/W/O IV contrast (3/2/18):


   - Innumerable masses scattered throughout the lungs. Numerous lymph nodes 

throughout the mediastinum and both hilar regions





- Outpt CT Abd/pelvis W/W/O contrast (3/2/18):


   - There is widespread metastatic disease throughout the spleen, the liver 

and the retroperitoneum. Lymphoma most likely diagnosis


    versus conceivably melanoma or breast cancer. 


   - Indeterminate sclerotic lesion in T12. 


   - Liver with 2.7cm low-density lesion in the lateral segment of the left 

lobe of the liver. At least 5 other low-density lesions scattered


    throughout the liver concerning for metastatic disease. 


   - Spleen is markedly heterogenous appearance with multiple masses. 


   - Retroperitoneum with 2.2cm left periaortic lymph node below the left renal 

vein. At least 3 other retroperitoneal lymph nodes are present. 


   - 3.0 x 1.6cm right inguinal lymph node. Some internal obturator 

lymphadenopathy on the right as well. 





- Pt underwent CT guided liver biopsy and US guided LN biopsy on 3/7/18. 

Pathology is pending. 


- AFP, CA-19-9, CEA, Ca 125 are WNL


- TSH, Free T4 are WNL


- Spoke with pathology labs yesterday and will check back later today to see if 

there are any preliminary results





- She denies ever having had a mammogram previously, no reported obvious breast 

masses. She does not perform self-breast exams regularly


- She does get pap smears annually and reports hx of an abnormal PAP 3 years 

ago and underwent a LEEP procedure at that time. Her last PAP


 was in 2017 and she reports that this was normal. 


- She has family hx of colon cancer, father diagnosed in his early 70s





(4) Back pain


ICD Codes:  M54.9 - Dorsalgia, unspecified


Status:  Acute


Plan:  


- Pt has been having increased pain in her tailbone and back more recently. 


- Thoracic Spine MRI (3/8/18) --> Signal abnormalities within the T3 and T4 

vertebral body and right T4 pedicle with contrast enhancement.  There is 


 also T1 prolongation without contrast enhancement in the T8 vertebral body.  

The appearance is nonspecific but is abnormal and suggests possible


 metastatic disease. 


- Lumbar Spine MRI (3/8/18) --> No evidence of disc bulge or protrusion. 

Triangular shaped area of T2 prolongation in the anterior inferior L1 vertebral 


 body with correlating enhancement on the postcontrast study.  This of 

uncertain significance, but would be an unusual shape and location for a


 metastatic lesion.  


- Sacral spine MRI (3/8/18) --> There are signal abnormalities in the superior 

S2 and bilateral sacroiliac with T2 prolongation and contrast enhancement.


  This suggests the possibility of metastatic lesions.


- Started Oramorph 15mg Q12H on 3/8 but she is still having a lot of pain. We 

will increase frequency to Q8H on the Oramorph


- IV Morphine 2mg Q3H PRN and Norco PRN





(5) Liver mass


ICD Codes:  R16.0 - Hepatomegaly, not elsewhere classified


Plan:  


- See above


- Pathology pending. 





(6) Lymph node enlargement


ICD Codes:  R59.9 - Enlarged lymph nodes, unspecified


Plan:  


- See above. 


- Pathology pending





Assessment and Plan


Patient examined.


Assessment and plan formulated with Renée Dykes PA-C.


I agree with the above.


metastatic dz process. biopsies pending. severe pain


adjust pain meds and await biopsy then consult oncology.











Renée Dykes Mar 9, 2018 11:23


Pedro Le MD Mar 9, 2018 12:19

## 2018-03-09 NOTE — MB
cc:

Mary Kennedy MD, Abdul J MD Hernandez,Diana Le,Pedro LOZA MD

****

 

 

DATE OF CONSULT:

2018

 

REASON FOR CONSULTATION:

Consult requested by Dr. Le for evaluation of Hodgkin's 

lymphoma.

 

HISTORY OF PRESENT ILLNESS:

Annette is a pleasant 38-year-old female.  She is without having any 

significant past medical history, except that she has anxiety, 

depression and gastroesophageal reflux disease.  The patient says that

she has not been feeling well for at least a year or so.  She did not 

seek any medical advice, as she did not have any health insurance.  

About 4 months  ago in December she had developed pneumonia-like 

symptoms with cough.  She took over-the-counter medications.  Since 

then, the patient said that she has been running fever.  Sometimes it 

goes up to 103.  Also, she gets drenching night sweats.  She has lost 

20 pounds in the last 4 months.  She stated that she has been eating 

but not gaining weight.

 

She also had been complaining of back pain and severe bone pain.  She 

finally got health insurance last month and went to see Dr. Carbajal.

 Blood workup was ordered and she was found to have severe anemia.  A 

CAT of the chest, abdomen and pelvis was done which showed 

lymphadenopathy, multiple lesions in the lung, spleen and liver.  The 

patient underwent biopsy of the liver mass and also right inguinal 

lymph node.  This was done as an outpatient.

 

After the biopsy when patient went home, she started feeling sick and 

had a fever of 104 with chills and sweating.  She had called her 

primary physician who advised her to come to the emergency room for 

further evaluation.

 

When patient came into the emergency room, she had a blood test which 

showed a white count of 11.6, hemoglobin 7.4, hematocrit 22.9, MCV 75,

platelet count 625.  The comprehensive metabolic profile was normal 

except the potassium was 3.4, alkaline phosphatase was 220, total 

protein was 8.5 and albumin was 2.8.  The patient was admitted to the 

hospital for anemia.  She had received 2 units of blood transfusion.  

Dr. Le received a call from the pathologist, Dr. Snider, that

the lymph node biopsy came back positive for classical Hodgkin's 

lymphoma.  Dr. Le had contacted me for further evaluation and

management of Hodgkin's lymphoma.

 

The patient has been not feeling well.  She has severe bone, 

especially tailbone pain, bilateral shoulder pain and middle back 

pain.  She has been getting morphine which does not touch her pain and

this has been changed to Dilaudid a few hours ago.  Her significant 

other was present at the bedside.  The rest of the review of systems 

is negative.

 

PAST MEDICAL HISTORY:

Depression, anxiety, gastroesophageal reflux disease, history of 

hypoglycemia, endometriosis, ovarian cyst.

 

PAST SURGICAL HISTORY:

Bilateral ovarian cyst removed several years ago.

 

ALLERGIES:

NONE.

 

MEDICATIONS:

A lot of over-the-counter medications and Zantac due to the 

gastroesophageal reflux disease.

 

FAMILY HISTORY:

Father is alive with colon cancer.  Mother is alive and well.  The 

patient has 2 brothers and 1 sister.  One of the brothers  from a 

motor vehicle accident.  She does not have any children.

 

SOCIAL HISTORY:

The patient does not smoke cigarettes anymore, occasionally drinks 

alcohol.  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 

unemployed.

 

PHYSICAL EXAMINATION:

GENERAL:  A well-developed, well-nourished white female in mild 

distress due to the back pain.

VITAL SIGNS:  Temperature 97.8, heart rate is 88, respiratory rate is 

18, blood pressure is 93/57, O2 saturation is 95%.

HEENT:  JULIÁN, EOMI.  Anicteric.  No oral lesions noted.

NECK:  No lymphadenopathy noted.

LUNGS:  Clear.  No wheezing, rhonchi, rales.

CARDIAC:  Regular rate and rhythm.

ABDOMEN:  Diffusely tender.

EXTREMITIES:  No pedal edema.

NEUROLOGIC:  Awake, alert and oriented x 3.

SKIN:  No significant lesions are noted.

 

ASSESSMENT:

1.  Classical Hodgkin's lymphoma, mixed cellularity stage IV-B with 

visceral involvement.

2.  Severe back pain due to multiple lesions in the spine from 

Hodgkin's lymphoma.

3.  Fever, night sweats and weight loss due to B symptoms of Hodgkin's

lymphoma.

4.  History of anxiety and depression.

5.  Gastroesophageal reflux disease.

6.  History of endometriosis with bilateral ovarian cysts.

 

PLAN:

I have reviewed her available records and I had an extensive 

discussion with the patient and her significant other regarding the 

diagnosis, staging, treatment and prognosis of stage IV-B Hodgkin's 

lymphoma.  The patient is in severe pain.  Therefore, my 

recommendation is to consult radiation oncologist to evaluate for 

palliative radiation therapy to the spine to control her pain.  I have

also recommended that we need to start chemotherapy as soon as 

possible as she has visceral involvement such as lungs, liver and 

spleen.

 

We discussed the results of the CAT scan which was done as an 

outpatient.  The CT of the chest done on  showed multiple masses 

scattered throughout the lungs.  There are numerous lymph nodes noted 

in the mediastinum and both hilar regions. The CAT scan of the abdomen

and pelvis which was done on the same day, , showed widespread 

metastatic disease throughout the spleen, liver, retroperitoneum, 

sclerotic lesion in T12.  There is a 2.7 cm low-density lesion noted 

in the left lobe of the liver.  There are at least 5 other low-density

lesions scattered throughout the liver concerning for metastatic 

disease.  Spleen has markedly heterogenous appearance with multiple 

masses.  Left paraaortic lymphadenopathy with 2.2 cm.  There are 3 

other retroperitoneal lymph nodes noted as well.  There is a 3 cm 

right inguinal lymph node noted.   There is internal obturator

lymphadenopathy noted on the right side.  She had multiple 

tumor markers done, which are all normal such as alpha-fetoprotein, CA

19-9, CEA and CA-125.  TSH and free T4 are also normal.

 

She had an upper endoscopy last month which showed some gastritis and 

esophagitis.  Due to the anemia and stools are heme positive, she 

underwent upper endoscopy again today which came back normal.  

Colonoscopy has been recommended, but the patient wants to have it 

done as an outpatient.

 

Unfortunately, the iron studies were not done prior to the blood 

transfusion.  I have called the lab and asked them if they can run the

iron studies on the admission specimen.  The iron studies done today 

are not reliable since these were done after the blood transfusion.  

She does have microcytic hypochromic anemia which is due to the iron 

deficiency.  The cause of iron deficiency is unknown at the present 

time.  Certainly, anemia could be from bone marrow involvement by the 

lymphoma.

 

We discussed that she has quite extensive widely disseminated 

Hodgkin's lymphoma.  We need to start treatment as soon as possible.  

We will see how she responds to the chemotherapy.  She had a hepatitis

screen which came back negative.  The liver biopsy is non-diagnostic. 

I will check the HIV test prior to giving her chemotherapy.  I will 

also check the vitamin B12 and serum folate to evaluate for the cause 

of the anemia.  I will get the echocardiogram for left ventricular 

ejection fraction prior to the anthracycline chemotherapy.  I will 

also get a pulmonary function test with DLCO prior to giving bleomycin

chemotherapy.  We will also check LDH and sed rate for lymphoma tumor 

burden.

 

We discuss results of MRI whole spine which shows multiple lesions c/w lymphoma.

I have placed a consult for radiation oncologist to see if

she can get radiation therapy to the painful site, especially the 

tailbone and the middle back.  She is in excruciating pain which is 

not bearable according to her.  She has been getting morphine and now 

it is switched over to Dilaudid.

 

I will transfer the patient to the oncology floor where she will get 

the chemotherapy.  I have discussed with the patient's nurse as well. 

 

The patient and her significant other have asked several questions and

these were answered to their satisfaction.



We also discuss that chemotherpay and radiation may cause infertility and 
option 

of egg cryopreservation was offered. Pt has decline it. She does not want to 
delay 

her chemotherapy. Child adoption as an alternative was discuss. 

 

Thank you, Dr. Le, for asking my opinion.

 

 

__________________________________

MD JEAN CARLOS Echevarria//neri

D: 2018, 06:38 PM

T: 2018, 08:29 PM

Visit #: [id number]

Job #: [id number]

JAMAL

## 2018-03-10 VITALS — HEART RATE: 104 BPM

## 2018-03-10 VITALS
OXYGEN SATURATION: 100 % | TEMPERATURE: 97.5 F | HEART RATE: 108 BPM | DIASTOLIC BLOOD PRESSURE: 68 MMHG | RESPIRATION RATE: 17 BRPM | SYSTOLIC BLOOD PRESSURE: 120 MMHG

## 2018-03-10 VITALS
RESPIRATION RATE: 18 BRPM | TEMPERATURE: 98.3 F | DIASTOLIC BLOOD PRESSURE: 68 MMHG | HEART RATE: 80 BPM | SYSTOLIC BLOOD PRESSURE: 102 MMHG | OXYGEN SATURATION: 98 %

## 2018-03-10 VITALS — HEART RATE: 116 BPM

## 2018-03-10 VITALS
DIASTOLIC BLOOD PRESSURE: 58 MMHG | SYSTOLIC BLOOD PRESSURE: 102 MMHG | OXYGEN SATURATION: 100 % | HEART RATE: 104 BPM | RESPIRATION RATE: 16 BRPM | TEMPERATURE: 99.8 F

## 2018-03-10 VITALS — HEART RATE: 107 BPM | SYSTOLIC BLOOD PRESSURE: 101 MMHG | DIASTOLIC BLOOD PRESSURE: 55 MMHG

## 2018-03-10 VITALS
SYSTOLIC BLOOD PRESSURE: 108 MMHG | OXYGEN SATURATION: 100 % | HEART RATE: 96 BPM | RESPIRATION RATE: 24 BRPM | TEMPERATURE: 98.6 F | DIASTOLIC BLOOD PRESSURE: 69 MMHG

## 2018-03-10 VITALS — HEART RATE: 110 BPM

## 2018-03-10 VITALS
OXYGEN SATURATION: 100 % | SYSTOLIC BLOOD PRESSURE: 111 MMHG | RESPIRATION RATE: 20 BRPM | HEART RATE: 105 BPM | TEMPERATURE: 102.7 F | DIASTOLIC BLOOD PRESSURE: 69 MMHG

## 2018-03-10 VITALS — HEART RATE: 76 BPM

## 2018-03-10 VITALS — HEART RATE: 105 BPM

## 2018-03-10 VITALS — HEART RATE: 94 BPM

## 2018-03-10 VITALS — HEART RATE: 86 BPM

## 2018-03-10 LAB
A1AT SERPL-MCNC: 346 MG/DL (ref 100–190)
ALBUMIN SERPL-MCNC: 2 GM/DL (ref 3.4–5)
ALP SERPL-CCNC: 196 U/L (ref 45–117)
ALT SERPL-CCNC: 17 U/L (ref 10–53)
AST SERPL-CCNC: 16 U/L (ref 15–37)
BASOPHILS # BLD AUTO: 0 TH/MM3 (ref 0–0.2)
BASOPHILS NFR BLD: 0.2 % (ref 0–2)
BILIRUB SERPL-MCNC: 0.4 MG/DL (ref 0.2–1)
BUN SERPL-MCNC: 8 MG/DL (ref 7–18)
CALCIUM SERPL-MCNC: 8.3 MG/DL (ref 8.5–10.1)
CHLORIDE SERPL-SCNC: 98 MEQ/L (ref 98–107)
CREAT SERPL-MCNC: 0.54 MG/DL (ref 0.5–1)
EOSINOPHIL # BLD: 0.2 TH/MM3 (ref 0–0.4)
EOSINOPHIL NFR BLD: 2.1 % (ref 0–4)
ERYTHROCYTE [DISTWIDTH] IN BLOOD BY AUTOMATED COUNT: 18.6 % (ref 11.6–17.2)
GFR SERPLBLD BASED ON 1.73 SQ M-ARVRAT: 126 ML/MIN (ref 89–?)
GLUCOSE SERPL-MCNC: 68 MG/DL (ref 74–106)
HCO3 BLD-SCNC: 24.4 MEQ/L (ref 21–32)
HCT VFR BLD CALC: 25.6 % (ref 35–46)
HGB BLD-MCNC: 8.8 GM/DL (ref 11.6–15.3)
LYMPHOCYTES # BLD AUTO: 0.6 TH/MM3 (ref 1–4.8)
LYMPHOCYTES NFR BLD AUTO: 6.2 % (ref 9–44)
MCH RBC QN AUTO: 26.6 PG (ref 27–34)
MCHC RBC AUTO-ENTMCNC: 34.3 % (ref 32–36)
MCV RBC AUTO: 77.7 FL (ref 80–100)
MONOCYTE #: 0.7 TH/MM3 (ref 0–0.9)
MONOCYTES NFR BLD: 7.9 % (ref 0–8)
NEUTROPHILS # BLD AUTO: 7.4 TH/MM3 (ref 1.8–7.7)
NEUTROPHILS NFR BLD AUTO: 83.6 % (ref 16–70)
PLATELET # BLD: 586 TH/MM3 (ref 150–450)
PMV BLD AUTO: 6.6 FL (ref 7–11)
PROT SERPL-MCNC: 6.7 GM/DL (ref 6.4–8.2)
RBC # BLD AUTO: 3.3 MIL/MM3 (ref 4–5.3)
SODIUM SERPL-SCNC: 134 MEQ/L (ref 136–145)
WBC # BLD AUTO: 8.9 TH/MM3 (ref 4–11)

## 2018-03-10 RX ADMIN — MORPHINE SULFATE SCH MG: 30 TABLET, EXTENDED RELEASE ORAL at 16:57

## 2018-03-10 RX ADMIN — ACETAMINOPHEN PRN MG: 325 TABLET ORAL at 23:25

## 2018-03-10 RX ADMIN — HYDROMORPHONE HYDROCHLORIDE PRN MG: 2 INJECTION INTRAMUSCULAR; INTRAVENOUS; SUBCUTANEOUS at 03:38

## 2018-03-10 RX ADMIN — STANDARDIZED SENNA CONCENTRATE AND DOCUSATE SODIUM SCH TAB: 8.6; 5 TABLET, FILM COATED ORAL at 08:00

## 2018-03-10 RX ADMIN — HYDROMORPHONE HYDROCHLORIDE PRN MG: 2 INJECTION INTRAMUSCULAR; INTRAVENOUS; SUBCUTANEOUS at 16:58

## 2018-03-10 RX ADMIN — MORPHINE SULFATE SCH MG: 15 TABLET, EXTENDED RELEASE ORAL at 01:11

## 2018-03-10 RX ADMIN — HYDROMORPHONE HYDROCHLORIDE PRN MG: 2 INJECTION INTRAMUSCULAR; INTRAVENOUS; SUBCUTANEOUS at 07:58

## 2018-03-10 RX ADMIN — STANDARDIZED SENNA CONCENTRATE AND DOCUSATE SODIUM SCH TAB: 8.6; 5 TABLET, FILM COATED ORAL at 21:02

## 2018-03-10 RX ADMIN — HYDROMORPHONE HYDROCHLORIDE PRN MG: 2 INJECTION INTRAMUSCULAR; INTRAVENOUS; SUBCUTANEOUS at 12:34

## 2018-03-10 RX ADMIN — Medication SCH ML: at 21:02

## 2018-03-10 RX ADMIN — Medication SCH ML: at 08:01

## 2018-03-10 RX ADMIN — HYDROMORPHONE HYDROCHLORIDE PRN MG: 2 INJECTION INTRAMUSCULAR; INTRAVENOUS; SUBCUTANEOUS at 21:01

## 2018-03-10 RX ADMIN — ACETAMINOPHEN PRN MG: 325 TABLET ORAL at 16:03

## 2018-03-10 NOTE — HHI.GIFU
Subjective


Remarks


Pt just got out of the shower, doing good considerably, no N/V or bleeding. 

Still refusing colonoscopy  


 (Clinton Nath)





Objective


Vitals I&O





Vital Signs








  Date Time  Temp Pulse Resp B/P (MAP) Pulse Ox O2 Delivery O2 Flow Rate FiO2


 


3/10/18 08:00  95      


 


3/10/18 08:00 98.6 96 24 108/69 (82) 100   


 


3/10/18 06:00  76      


 


3/10/18 05:00  76      


 


3/10/18 04:00  80      


 


3/10/18 04:00 98.3 80 18 102/68 (79) 98   


 


3/10/18 03:00  86      


 


3/10/18 02:00  104      


 


3/10/18 01:00  116      


 


3/10/18 00:40  107  101/55 (70)    


 


3/10/18 00:21  110      


 


3/9/18 23:50 100.6 129 22 117/65 (82) 94   


 


3/9/18 21:45 99.4 91  98/60 (73) 98   


 


3/9/18 20:00 96.6 96 16 94/59 (71) 95   


 


3/9/18 16:00 97.8 88 18 93/57 (69) 95   


 


3/9/18 14:00 102.7 119 16 88/50 (63) 93   


 


3/9/18 12:21 99.6 99 20 100/60 (73) 96   














I/O      


 


 3/9/18 3/9/18 3/9/18 3/10/18 3/10/18 3/10/18





 07:00 15:00 23:00 07:00 15:00 23:00


 


Intake Total 1000 ml 400 ml  480 ml  


 


Balance 1000 ml 400 ml  480 ml  


 


      


 


Intake Oral 1000 ml   480 ml  


 


Other  400 ml    


 


# Voids 3   3  


 


# Bowel Movements 2     








Laboratory





Laboratory Tests








Test


  3/9/18


12:30 3/9/18


17:35 3/9/18


23:15 3/10/18


08:34


 


White Blood Count 9.5    8.9 


 


Red Blood Count 3.42    3.30 


 


Hemoglobin 8.9    8.8 


 


Hematocrit 26.3    25.6 


 


Mean Corpuscular Volume 76.9    77.7 


 


Mean Corpuscular Hemoglobin 25.9    26.6 


 


Mean Corpuscular Hemoglobin


Concent 33.7 


  


  


  34.3 


 


 


Red Cell Distribution Width 18.6    18.6 


 


Platelet Count 591    586 


 


Mean Platelet Volume 6.8    6.6 


 


Neutrophils (%) (Auto) 87.5    83.6 


 


Lymphocytes (%) (Auto) 5.9    6.2 


 


Monocytes (%) (Auto) 5.4    7.9 


 


Eosinophils (%) (Auto) 0.9    2.1 


 


Basophils (%) (Auto) 0.3    0.2 


 


Neutrophils # (Auto) 8.3    7.4 


 


Lymphocytes # (Auto) 0.6    0.6 


 


Monocytes # (Auto) 0.5    0.7 


 


Eosinophils # (Auto) 0.1    0.2 


 


Basophils # (Auto) 0.0    0.0 


 


CBC Comment DIFF FINAL    DIFF FINAL 


 


Differential Comment      


 


Iron Level 12  10   


 


Total Iron Binding Capacity 235  293   


 


Percent Iron Saturation 5.1  3.4   


 


Ferritin 384  234   


 


Hepatitis A IgM Antibody NEGATIVE    


 


Hepatitis B Surface Antigen NEGATIVE    


 


Hepatitis B Core IgM Antibody NEGATIVE    


 


Hepatitis C Antibody NEGATIVE    


 


Lactate Dehydrogenase  297   


 


Vitamin B12 Level  1166   


 


Folate  13.8   


 


Erythrocyte Sedimentation Rate


  


  


  GREATER THAN


140 


 


 


Blood Urea Nitrogen    8 


 


Creatinine    0.54 


 


Random Glucose    68 


 


Total Protein    6.7 


 


Albumin    2.0 


 


Calcium Level    8.3 


 


Alkaline Phosphatase    196 


 


Aspartate Amino Transf


(AST/SGOT) 


  


  


  16 


 


 


Alanine Aminotransferase


(ALT/SGPT) 


  


  


  17 


 


 


Total Bilirubin    0.4 


 


Sodium Level    134 


 


Potassium Level    3.2 


 


Chloride Level    98 


 


Carbon Dioxide Level    24.4 


 


Anion Gap    12 


 


Estimat Glomerular Filtration


Rate 


  


  


  126 


 














 Date/Time


Source Procedure


Growth Status


 


 


 3/8/18 06:40


Blood Other Aerobic Blood Culture - Preliminary


NO GROWTH IN 2 DAYS Resulted


 


 3/8/18 06:40


Blood Other Anaerobic Blood Culture - Preliminary


NO GROWTH IN 2 DAYS Resulted








Imaging





Last Impressions








Thoracic Spine MRI 3/8/18 0000 Signed





Impressions: 





 Service Date/Time:  Thursday, March 8, 2018 17:55 - CONCLUSION:  Signal 





 abnormalities within the T3 and T4 vertebral body and right T4 pedicle with 





 contrast enhancement.  There is also T1 prolongation without contrast 





 enhancement in the T8 vertebral body.  The appearance is nonspecific but is 





 abnormal and suggests possible metastatic disease.     Roby Matos MD 


 


Sacrum/Coccyx MRI 3/8/18 0000 Signed





Impressions: 





 Service Date/Time:  Thursday, March 8, 2018 17:55 - CONCLUSION:  There are 





 signal abnormalities in the superior S2 and bilateral sacroiliac with T2 





 prolongation and contrast enhancement.  This suggests the possibility of 





 metastatic lesions.     Roby Matos MD 


 


Lumbar Spine MRI 3/8/18 0000 Signed





Impressions: 





 Service Date/Time:  Thursday, March 8, 2018 17:55 - CONCLUSION:  1. No 

evidence 





 of disc bulge or protrusion. 2. Triangular shaped area of T2 prolongation in 

the 





 anterior inferior L1 vertebral body with correlating enhancement on the 





 postcontrast study.  This of uncertain significance, but would be an unusual 





 shape and location for a metastatic lesion.     Roby Matos MD 


 


Abdomen X-Ray 3/8/18 0000 Signed





Impressions: 





 Service Date/Time:  Thursday, March 8, 2018 15:26 - CONCLUSION:  Moderate 

stool 





 throughout the colon. Otherwise, benign-appearing abdomen.     Enrico Russell MD 


 


Hip and Pelvis X-Ray 3/7/18 1854 Signed





Impressions: 





 Service Date/Time:  Wednesday, March 7, 2018 19:11 - CONCLUSION:  Negative 





 evaluation of the left hip.     Roby Matos MD 








Physical Exam


HEENT: Pupils round and reactive to light; normocephalic; atraumatic; no 

jaundice.  Throat is clear.


NECK: Neck is supple, no JVD, no lymphadenopathy.


CHEST:  Chest is clear to auscultation and percussion.


CARDIAC:  Regular rate and rhythm with no murmur gallop or rubs.


ABDOMEN:  Soft, nondistended, nontender;bowel sounds are present in all four 

quadrants.


EXTREMITIES: No clubbing, cyanosis, or edema.


SKIN:  Normal; no rash; no jaundice.


CNS:  No focal deficits; alert and oriented times three.


 (Clinton Nath [facility])





Assessment and Plan


Plan


ASSESSMENT


- anemia, heme pos stool - No bleeding reported, could be multifactorial, pt 

with  widespread metastatic disease throughout the spleen, the liver and the 

retroperitoneum.


   hgb 7.4 on admission, no obvious bleeding reported. S/p negative EGD on 3/9/

18, refusing colonoscopy 


- widespread metastatic disease throughout the spleen, the liver and the 

retroperitoneum.  tumor markers all wnl, oncology on the case, plans for 

radiation 


  S/P liver bx,3/7/18  - liver biopsy demonstrates intact hepatic architecture  

HEPATIC TISSUE WITH PROMINENT SINUSOIDAL DILATATION. NEGATIVE FOR MALIGNANCY.


  S/P  CT GUIDED NEEDLE BIOPSY OF RIGHT INGUINAL LYMPH NODE- -  CLASSICAL 

HODGKINS LYMPHOMA 





PLAN


- LUPE


- Not much to add from GI stand point


- monitor labs


- Patient refusing colonoscopy 


- Oncology on the case


- GI will sign off





pt seen by myself and Dr Patten and this note is on his behalf


 (Clinton Nath)


Physician Comments


As above, please notify us if needed again.


 (Cheri Patten MD)











Clinton Nath Mar 10, 2018 11:55


Cheri Patten MD Mar 10, 2018 12:25

## 2018-03-10 NOTE — MB
cc:

Deangelo Fernandes MD,Mary Carbajal,Diana Le,Pedro LOZA MD

****

 

 

DATE OF CONSULT:

 

This patient was seen at the request of Dr. Kennedy for a lady with 

newly diagnosed Hodgkin lymphoma.

 

BRIEF HISTORY:

This is a 38-year-old female who has been healthy over the majority of

her lifetime.  Unfortunately, for the past several months, perhaps a 

year, she has been feeling less well.  For the past 4-6 months she 

developed a cough, mostly nonproductive.  She has run fevers 

associated with drenching sweats and has lost at least 20 pounds.

 

Over the past year she has developed increasing pain. She indicates 

pain is in multiple locations, her ribs, her shoulders, but most 

severe pain appears to be in her low back, low pelvic area. She denies

any aggravating or alleviating factors.

 

When she recently obtained health insurance, workup included anemia.  

CT scan on an outpatient basis of the chest, abdomen and pelvis 

revealed pulmonary lesions with lymphadenopathy.  She underwent a 

right inguinal node biopsy which was performed as an outpatient which 

was termed classical Hodgkin lymphoma.  She also underwent a CT guided

liver biopsy which was negative for malignancy.  She has been 

subsequently admitted to the hospital where she has undergone further 

imaging including MRI of the lumbar spine, thoracic spine and sacrum 

coccyx region.  This revealed signal abnormalities in the superior S2 

and bilateral sacroiliac with T2 prolongation and contrast enhancement

suggesting possible metastatic disease.  In the lumbar spine she was 

noted to have  a triangular shaped area of T2 prolongation in the 

anterior inferior L1 vertebral body with correlating enhancement on 

the post contrast study.  This was though to be an unusual location 

for metastatic disease.  In the thoracic spine signal abnormalities 

within T3 and T4 vertebral body and right T4 pedicle with contrast 

enhancement.  There T1 prolongation without contrast enhancement and 

the T8 vertebral body.  The appearance is nonspecific but it abnormal 

and suggesting metastatic disease.

 

This lady has been seen by Dr. Kennedy who has recommended systemic 

therapy.  She has been transferred to oncology but because of her pain

which has proven to control with narcotic analgesics, we are being 

asked to see her for consideration of radiation treatment.

 

PAST MEDICAL SURGICAL HISTORY INCLUDES:

1.  Depression.

2.  Anxiety.

3.  Gastroesophageal reflux.

4.  Endometriosis.

5.  Surgery for ovarian cysts.

 

ALLERGIES:

NONE.

 

MEDICATIONS ON ADMISSION:

Included over-the-counter medications and Zantac.

 

SOCIAL HISTORY/FAMILY HISTORY:

Father has had colon cancer.  Mother is alive and well.  One brother 

unfortunately  from a motor vehicle accident.  She has had no 

children.  She gives a occasional smoking history years ago.  She is 

currently unemployed.

 

SYSTEMS REVIEW:

This lady has had a history of fevers, sweats and weight loss. She has

pain which is in multiple locations and somewhat nonspecific but 

certainly more severe in the low posterior pelvic region consistent 

with the abnormality found at S2 and the sacroiliac region as well.  

She does not give a history of peripheral neuropathy associated with 

this pain.  She denies shortness of breath but she does have a 

bothersome cough without sputum production.  She admits to fatigue and

weight loss.  She denies chest pain, although she does have ribcage 

pain without palpitations.  She has gastroesophageal reflux.  She 

denies diarrhea or bloody stool.  She denies urinary complaints. She 

has no neurologic complaints.  She has no dermatologic complaints.

 

PHYSICAL EXAMINATION:

GENERAL:  Today on exam she is alert and oriented.  She was 

accompanied by her mother but she is obviously uncomfortable.

VITAL SIGNS:  She was afebrile with a pulse of 104 and regular, 

respiratory rate is 16, blood pressure of 102/58 with a pulse oximetry

of 100 on room air.

HEENT:  There was no jaundice.  Her conjunctivae and eyelids were 

normal.

LYMPHATICS:  I did not palpate adenopathy in her head and neck region.

 She had shotty adenopathy in both axillary regions.

LUNGS:  Her lung fields were clear without effusion.

HEART:  Sounds were normal with no murmurs, rubs or bruits.

ABDOMEN:  Diffusely tender.  No hepatosplenomegaly, no evidence of 

ascites.

EXTREMITIES:  She is moving all limbs.

SKIN:  Warm.

 

REVIEW OF DATA:

I have reviewed this lady's outpatient imaging including her CT chest,

abdomen and pelvis.  There is mediastinal disease.  There is also 

disease in the periaortic and pelvic area, at least my interpretation 

of the scans.  There is possibly some lateral infraclavicular disease 

on the right although I was not able to palpate this on exam.  As well

I have reviewed her MR studies.  The findings on the MR scan are 

somewhat subtle but certainly the radiologists are indicating disease 

at S3 and the sacrum which would be consistent with her pain.  All of 

this was reviewed with the patient.

 

I have discussed radiation treatment with her.  I have stressed that 

chemotherapy is the most important aspect of her care as she is 

presenting with stage IVB disease at this time.  As classical 

chemotherapy does involve the use of steroids, I am somewhat 

optimistic that when her treatment is stated, the steroid effect being

both cytotoxic and its essentially dehydrating effect on the tumor may

relieve some of the pressure and relieve her pain.  Unfortunately, 

chemotherapy regimen and radiation treatment cannot be delivered at 

the same time.  It would be my anticipation that we would want to 

start her systemic chemotherapy regimen as soon as possible.  If her 

pain is not improving, then I would consider delivering radiation 

treatment perhaps just prior to her next chemotherapy but I would need

to discuss this further with Dr. Kennedy.  We could try to limit her 

radiation treatment to 5 fractions.  I do not believe she is a 

candidate for single fraction treatment.

 

As chemotherapy is such an important part of her regimen, I am 

reluctant to deliver radiation treatment and compromise any of her 

bone marrow, however, if pain remains a dominant symptom, then this 

may be necessary.

 

I will discuss this further with Dr. Kennedy and we can then 

determine the treatment course.

 

 

__________________________________

MD ALEXSANDRA Cesar/rt

D: 03/10/2018, 01:47 PM

T: 03/10/2018, 10:03 PM

Visit #: [id number]

Job #: [id number]

## 2018-03-10 NOTE — PD.ONC.PN
Subjective


Subjective


Remarks


T-max 100.6 overnight


Patient reports she still has significant pain in her tailbone and left hip


Also complaining of itching and continued night sweats


Denies drowsiness from pain medications





Objective


Data











  Date Time  Temp Pulse Resp B/P (MAP) Pulse Ox O2 Delivery O2 Flow Rate FiO2


 


3/10/18 06:00  76      


 


3/10/18 05:00  76      


 


3/10/18 04:00  80      


 


3/10/18 04:00 98.3 80 18 102/68 (79) 98   


 


3/10/18 03:00  86      


 


3/10/18 02:00  104      


 


3/10/18 01:00  116      


 


3/10/18 00:40  107  101/55 (70)    


 


3/10/18 00:21  110      


 


3/9/18 23:50 100.6 129 22 117/65 (82) 94   


 


3/9/18 21:45 99.4 91  98/60 (73) 98   


 


3/9/18 20:00 96.6 96 16 94/59 (71) 95   


 


3/9/18 16:00 97.8 88 18 93/57 (69) 95   


 


3/9/18 14:00 102.7 119 16 88/50 (63) 93   


 


3/9/18 12:21 99.6 99 20 100/60 (73) 96   


 


3/9/18 09:31 97.9 89 18 91/46 (61) 97   














 3/10/18 3/10/18 3/10/18





 07:00 15:00 23:00


 


Intake Total 480 ml  


 


Balance 480 ml  








Result Diagram:  


3/9/18 1230                                                                    

            3/8/18 0640





Laboratory Results





Laboratory Tests








Test


  3/9/18


12:30 3/9/18


17:35 3/9/18


23:15


 


White Blood Count 9.5 TH/MM3   


 


Red Blood Count 3.42 MIL/MM3   


 


Hemoglobin 8.9 GM/DL   


 


Hematocrit 26.3 %   


 


Mean Corpuscular Volume 76.9 FL   


 


Mean Corpuscular Hemoglobin 25.9 PG   


 


Mean Corpuscular Hemoglobin


Concent 33.7 % 


  


  


 


 


Red Cell Distribution Width 18.6 %   


 


Platelet Count 591 TH/MM3   


 


Mean Platelet Volume 6.8 FL   


 


Neutrophils (%) (Auto) 87.5 %   


 


Lymphocytes (%) (Auto) 5.9 %   


 


Monocytes (%) (Auto) 5.4 %   


 


Eosinophils (%) (Auto) 0.9 %   


 


Basophils (%) (Auto) 0.3 %   


 


Neutrophils # (Auto) 8.3 TH/MM3   


 


Lymphocytes # (Auto) 0.6 TH/MM3   


 


Monocytes # (Auto) 0.5 TH/MM3   


 


Eosinophils # (Auto) 0.1 TH/MM3   


 


Basophils # (Auto) 0.0 TH/MM3   


 


CBC Comment DIFF FINAL   


 


Differential Comment    


 


Iron Level 12 MCG/DL  10 MCG/DL  


 


Total Iron Binding Capacity 235 MCG/DL  293 MCG/DL  


 


Percent Iron Saturation 5.1 %  3.4 %  


 


Ferritin 384 NG/ML  234 NG/ML  


 


Hepatitis A IgM Antibody NEGATIVE   


 


Hepatitis B Surface Antigen NEGATIVE   


 


Hepatitis B Core IgM Antibody NEGATIVE   


 


Hepatitis C Antibody NEGATIVE   


 


Lactate Dehydrogenase  297 U/L  


 


Vitamin B12 Level  1166 PG/ML  


 


Folate  13.8 NG/ML  


 


Erythrocyte Sedimentation Rate


  


  


  GREATER THAN


140 mm/hr








Culture Results





Microbiology








 Date/Time


Source Procedure


Growth Status


 


 


 3/8/18 06:40


Blood Other Aerobic Blood Culture - Preliminary


NO GROWTH IN 1 DAY Resulted


 


 3/8/18 06:40


Blood Other Anaerobic Blood Culture - Preliminary


NO GROWTH IN 1 DAY Resulted











Administered Medications








 Medications


  (Trade)  Dose


 Ordered  Sig/Nohemi


 Route


 PRN Reason  Start Time


 Stop Time Status Last Admin


Dose Admin


 


 Sodium Chloride  1,000 ml @ 


 75 mls/hr  [id number]


 IV


   3/7/18 21:17


    3/9/18 21:45


 


 


 Sodium Chloride


  (NS Flush)  2 ml  BID


 IV FLUSH


   3/8/18 09:00


    3/10/18 08:01


 


 


 Acetaminophen


  (Tylenol)  650 mg  Q4H  PRN


 PO


 TEMP > 100.4  3/7/18 21:30


    3/9/18 23:50


 


 


 Senna/Docusate


 Sodium


  (Thelma-Colace)  1 tab  BID


 PO


   3/8/18 09:00


    3/10/18 08:00


 


 


 Lactulose


  (Lactulose Liq)  30 ml  DAILY  PRN


 PO


 SEVERE CONSITIPATION  3/7/18 21:30


    3/8/18 16:22


 


 


 Hydroxyzine HCl


  (Atarax)  10 mg  Q8H  PRN


 PO


 itching  3/7/18 21:45


    3/10/18 03:39


 


 


 Acetaminophen/


 Hydrocodone Bitart


  (Norco  5-325 Mg)  1 tab  Q4H  PRN


 PO


 pain 2-5  3/8/18 15:15


    3/9/18 19:50


 


 


 Lactated Ringer's  1,000 ml @ 


 30 mls/hr  Q24H PRN


 IV


 SEE LABEL COMMENTS  3/9/18 06:45


 3/12/18 06:44  3/9/18 07:57


 


 


 Morphine Sulfate


  (Oramorph Sr)  15 mg  Q8H


 PO


   3/9/18 18:00


    3/10/18 01:11


 


 


 Hydromorphone HCl


  (Dilaudid Pf Inj)  2 mg  Q4H  PRN


 IV PUSH


 pain 6-10  3/9/18 22:15


    3/10/18 07:58


 








Objective Remarks


GENERAL: Thin young female sitting up in bed talking with significant other 

with occasional wincing


SKIN: Warm and dry.


HEAD: Normocephalic.


EYES: No injection or drainage. 


NECK: Supple, trachea midline. No JVD or lymphadenopathy.


LYMPHATIC: Left supraclavicular shoddy lymph node


CARDIOVASCULAR: Regular rate and rhythm without murmurs. 


RESPIRATORY: Clear posteriorly.  Breathing unlabored at rest.


GASTROINTESTINAL: Abdomen soft, non-tender, nondistended. 


EXTREMITIES: No cyanosis, or edema. 


MUSCULOSKELETAL: Adequate muscle tone.


NEUROLOGICAL: No obvious focal deficit. Awake, alert, and oriented x3.





Assessment/Plan


Problem List:  


(1) Hodgkin lymphoma


ICD Codes:  C81.90 - Hodgkin lymphoma, unspecified, unspecified site


Status:  Acute


Plan:  3/10/18: Await echocardiogram.  Likely start chemotherapy today.





--HIV test pending








Hx/Workup: The patient originally became symptomatic in December 2017 with 

pneumonia like symptoms.  She states that she had fevers with drenching night 

sweats.  She is also had a 20 pound weight loss in the last 4 months.  An 

outpatient CT scan of the chest abdomen and pelvis showed widespread 

lymphadenopathy, multiple lesions in the lung spleen and liver.  The patient 

had a biopsy on 3/7/18 of a right inguinal lymph node that showed classical 

Hodgkin's lymphoma.








(2) Back pain


ICD Codes:  M54.9 - Dorsalgia, unspecified


Status:  Acute


Plan:  --Patient still with significant pain despite IV Dilaudid.


--Discussed we can increase her long-acting pain medication for now





Assessment


39 y/o female admitted with severe back pain with a new diagnosis of classical 

Hodgkin's lymphoma


Attending Statement


The exam, history, and the medical decision-making described in the above note 

were completed with the assistance of the mid-level provider. I reviewed and 

agree with the findings presented.  I attest that I had a face-to-face 

encounter with the patient on the same day, and personally performed and 

documented my assessment and findings in the medical record.





Complaining of pain in the tailbone


Anxious to start treatment


Echo Shows  LVEF 55 to 70%


PFTS with [facility] is pending.


start chemo after PFT result. 


I explain to pt and family that her lungs are compromised due to involvement by 

lymphoma. Bleomycin has pulm toxicity. We need to have PFT with [facility] before 

chemo.


My recommendation is to start ABVD which is the standard regimen in [facility].


DR Fernandes saw the pt. Await his recommendation.


Family wants her to transfer to [facility]. I told them that i have no problem with 

that. Her insurance and DR Le needs to make arrangements for transfer.


Consult IR for port on monday.


Family have asked several questions and i have answered them.


D/W RN.


Start allopurinol and Decadron.


Check uric acid.


HIV is pending.


.


.











Judy Navarro Mar 10, 2018 09:00


Mary Kennedy MD Mar 10, 2018 23:52

## 2018-03-10 NOTE — ECHRPT
Indication:   PRE CHEMO EVALUATION

 

 CONCLUSIONS

 Normal left ventricular size. 

 Wall thickness is normal. 

 The left ventricular systolic function is hyperdynamic with an estimated ejection fraction in the ra
nge of 65-

 70%. 

 There is trace to mild tricuspid valve regurgitation. 

 The estimated pulmonary arterial pressure is 37.2 mmHg. 

 

 BP:  102   / 68      HR: 76                       Rhythm:           Sinus

 

 MEASUREMENTS  (Male / Female) Normal Values       Technical Quality:Fair

 2D ECHO

 LV Diastolic Diameter PLAX        5.3 cm                4.2 - 5.9 / 3.9 - 5.3 cm

 LV Systolic Diameter PLAX         3.7 cm                

 IVS Diastolic Thickness           0.8 cm                0.6 - 1.0 / 0.6 - 0.9 cm

 LVPW Diastolic Thickness          0.7 cm                0.6 - 1.0 / 0.6 - 0.9 cm

 LV Relative Wall Thickness        0.3                   

 RV Internal Dim ED PLAX           3.1 cm                

 LVOT Diameter                     2.1 cm                

 Aortic Root Diameter              3.0 cm                

 LA Systolic Diameter LX           3.0 cm                3.0 - 4.0 / 2.7 - 3.8 cm

 

 M-MODE

 AV Cusp Separation MM             2.2 cm                

 

 DOPPLER

 AV Peak Velocity                  135.0 cm/s            

 AV Peak Gradient                  7.3 mmHg              

 AV Mean Gradient                  3.0 mmHg              

 AV Velocity Time Integral         21.3 cm               

 LVOT Peak Velocity                111.0 cm/s            

 LVOT Peak Gradient                4.9 mmHg              

 LVOT Velocity Time Integral       17.9 cm               

 AV Area Cont Eq vti               2.9 cm               

 AV Area Cont Eq pk                2.8 cm               

 Mitral E Point Velocity           102.0 cm/s            

 Mitral A Point Velocity           79.5 cm/s             

 Mitral E to A Ratio               1.3                   

 LV E' Lateral Velocity            7.3 cm/s              

 Mitral E to LV E' Lateral Ratio   14.0                  

 LV E' Septal Velocity             8.2 cm/s              

 Mitral E to LV E' Septal Ratio    12.5                  

 TR Peak Velocity                  261.0 cm/s            

 TR Peak Gradient                  27.2 mmHg             

 Right Atrial Pressure             10.0 mmHg             

 Pulmonary Artery Systolic Pressu  37.2 mmHg             

 Right Ventricular Systolic Press  37.2 mmHg             

 PV Peak Velocity                  85.0 cm/s             

 PV Peak Gradient                  2.9 mmHg              

 

 

 FINDINGS

 

 LEFT VENTRICLE

 Normal left ventricular size. 

 Wall thickness is normal. 

 The left ventricular systolic function is hyperdynamic with an estimated ejection fraction in the ra
nge of 65-

 70%. 

 

 RIGHT VENTRICLE

 Normal right ventricular size and systolic function. 

 

 LEFT ATRIUM

 The left atrial size is normal. 

 

 RIGHT ATRIUM

 The right atrial size is normal. 

 

 ATRIAL SEPTUM

 Normal atrial septal thickness without atrial level shunting by limited color doppler interrogation.
  

 

 AORTA

 The aortic root and proximal ascending aorta are normal in size on limited imaging.  

 

 MITRAL VALVE

 Structurally normal mitral valve. No mitral valve stenosis or regurgitation.  

 

 AORTIC VALVE

 Trileaflet aortic valve. No aortic valve stenosis or regurgitation.  

 

 TRICUSPID VALVE

 There is trace to mild tricuspid valve regurgitation. 

 The estimated pulmonary arterial pressure is 37.2 mmHg. 

 

 PULMONARY VALVE

 No pulmonary valve regurgitation or stenosis.  

 

 VESSELS

 The inferior vena cava is normal in size.  

 

 PERICARDIUM

 No pericardial effusion.  

 

 

 

 

  Niraj Ford MD

  (Electronically Signed)

  Final Date:10 March 2018 09:25

## 2018-03-10 NOTE — HHI.PR
Subjective


Remarks


sleeping. boyfriend in room. questions answered.





Objective


Vitals


nad


heart reg


lung cta


abd s/nt


ext no edema


Vital Signs








  Date Time  Temp Pulse Resp B/P (MAP) Pulse Ox O2 Delivery O2 Flow Rate FiO2


 


3/10/18 06:00  76      


 


3/10/18 05:00  76      


 


3/10/18 04:00  80      


 


3/10/18 04:00 98.3 80 18 102/68 (79) 98   


 


3/10/18 03:00  86      


 


3/10/18 02:00  104      


 


3/10/18 01:00  116      


 


3/10/18 00:40  107  101/55 (70)    


 


3/10/18 00:21  110      


 


3/9/18 23:50 100.6 129 22 117/65 (82) 94   


 


3/9/18 21:45 99.4 91  98/60 (73) 98   


 


3/9/18 20:00 96.6 96 16 94/59 (71) 95   


 


3/9/18 16:00 97.8 88 18 93/57 (69) 95   


 


3/9/18 14:00 102.7 119 16 88/50 (63) 93   


 


3/9/18 12:21 99.6 99 20 100/60 (73) 96   


 


3/9/18 09:31 97.9 89 18 91/46 (61) 97   








Result Diagram:  


3/9/18 1230                                                                    

            3/8/18 0640





Imaging





Last Impressions








Thoracic Spine MRI 3/8/18 0000 Signed





Impressions: 





 Service Date/Time:  Thursday, March 8, 2018 17:55 - CONCLUSION:  Signal 





 abnormalities within the T3 and T4 vertebral body and right T4 pedicle with 





 contrast enhancement.  There is also T1 prolongation without contrast 





 enhancement in the T8 vertebral body.  The appearance is nonspecific but is 





 abnormal and suggests possible metastatic disease.     Roby Matos MD 


 


Sacrum/Coccyx MRI 3/8/18 0000 Signed





Impressions: 





 Service Date/Time:  Thursday, March 8, 2018 17:55 - CONCLUSION:  There are 





 signal abnormalities in the superior S2 and bilateral sacroiliac with T2 





 prolongation and contrast enhancement.  This suggests the possibility of 





 metastatic lesions.     Roby Matos MD 


 


Lumbar Spine MRI 3/8/18 0000 Signed





Impressions: 





 Service Date/Time:  Thursday, March 8, 2018 17:55 - CONCLUSION:  1. No 

evidence 





 of disc bulge or protrusion. 2. Triangular shaped area of T2 prolongation in 

the 





 anterior inferior L1 vertebral body with correlating enhancement on the 





 postcontrast study.  This of uncertain significance, but would be an unusual 





 shape and location for a metastatic lesion.     Roby Matos MD 


 


Abdomen X-Ray 3/8/18 0000 Signed





Impressions: 





 Service Date/Time:  Thursday, March 8, 2018 15:26 - CONCLUSION:  Moderate 

stool 





 throughout the colon. Otherwise, benign-appearing abdomen.     Enrico Russell MD 


 


Hip and Pelvis X-Ray 3/7/18 1854 Signed





Impressions: 





 Service Date/Time:  Wednesday, March 7, 2018 19:11 - CONCLUSION:  Negative 





 evaluation of the left hip.     Roby Matos MD 











Outpt Chest CT W/W/O IV contrast (3/2/18):


- Innumerable masses scattered throughout the lungs. Numerous lymph nodes 

throughout the mediastinum and both hilar regions





Outpt CT Abd/pelvis W/W/O contrast (3/2/18):


- There is widespread metastatic disease throughout the spleen, the liver and 

the retroperitoneum. Lymphoma most likely diagnosis versus conceivably melanoma 

or breast cancer. 


- Indeterminate sclerotic lesion in T12. 


- Liver with 2.7cm low-density lesion in the lateral segment of the left lobe 

of the liver. At least 5 other low-density lesions scattered throughout the 

liver concerning for metastatic disease. 


- Spleen is markedly heterogenous appearance with multiple masses. 


- Retroperitoneum with 2.2cm left periaortic lymph node below the left renal 

vein. At least 3 other retroperitoneal lymph nodes are present. 


- 3.0 x 1.6cm right inguinal lymph node. Some internal obturator 

lymphadenopathy on the right as well.





A/P


Problem List:  


(1) Hodgkin lymphoma


ICD Codes:  C81.90 - Hodgkin lymphoma, unspecified, unspecified site


Status:  Acute


Plan:  - Pt had reported unintentional weight loss ~20lbs since 1/2018, night 

sweats and had outpt CT Chest/Abd/Pelvis on 3/2/18


- Outpt Chest CT W/W/O IV contrast (3/2/18):


   - Innumerable masses scattered throughout the lungs. Numerous lymph nodes 

throughout the mediastinum and both hilar regions





- Outpt CT Abd/pelvis W/W/O contrast (3/2/18):


   - There is widespread metastatic disease throughout the spleen, the liver 

and the retroperitoneum. Lymphoma most likely diagnosis


    versus conceivably melanoma or breast cancer. 


   - Indeterminate sclerotic lesion in T12. 


   - Liver with 2.7cm low-density lesion in the lateral segment of the left 

lobe of the liver. At least 5 other low-density lesions scattered


    throughout the liver concerning for metastatic disease. 


   - Spleen is markedly heterogenous appearance with multiple masses. 


   - Retroperitoneum with 2.2cm left periaortic lymph node below the left renal 

vein. At least 3 other retroperitoneal lymph nodes are present. 


   - 3.0 x 1.6cm right inguinal lymph node. Some internal obturator 

lymphadenopathy on the right as well. 





- Pt underwent CT guided liver biopsy and US guided LN biopsy on 3/7/18.


- AFP, CA-19-9, CEA, Ca 125 are WNL


- TSH, Free T4 are WNL





- Pt has been having increased pain in her tailbone and back more recently. 


- Thoracic Spine MRI (3/8/18) --> Signal abnormalities within the T3 and T4 

vertebral body and right T4 pedicle with contrast enhancement.  There is 


 also T1 prolongation without contrast enhancement in the T8 vertebral body.  

The appearance is nonspecific but is abnormal and suggests possible


 metastatic disease. 


- Lumbar Spine MRI (3/8/18) --> No evidence of disc bulge or protrusion. 

Triangular shaped area of T2 prolongation in the anterior inferior L1 vertebral 


 body with correlating enhancement on the postcontrast study.  This of 

uncertain significance, but would be an unusual shape and location for a


 metastatic lesion.  


- Sacral spine MRI (3/8/18) --> There are signal abnormalities in the superior 

S2 and bilateral sacroiliac with T2 prolongation and contrast enhancement.


  This suggests the possibility of metastatic lesions.





-Pathology from lymph nodes shows classic Hodgkin lymphoma. liver bx 

nondiagnostic





-Consulted Dr Kennedy. He has discussed with pt and family and chemotherapy 

planned immediately


-cont pain control and titrate as needed.


-await further oncology plans.








(2) Symptomatic anemia


ICD Codes:  D64.9 - Anemia, unspecified


Status:  Acute


Plan:  


- Pt is a 37 y/o female who was sent to the ED by her PCP for worsening anemia 

and leukocytosis on outpt labs. 


- Pt reported various complaints at the time of admission, including weight 

loss (Approx 20lbs in the last 3 months), intermittent dizziness, 


 fevers, "bone pain", night sweats. 


- Pts outpt H/H has decreased to Hgb 7.4/Hct 22.8, MCV 77, MCH 25 on 3/6/18 and 

was recommended to report to the ED for further eval. 


- Pt had previously reported intermittent black stools and was noted to be 

Hemoccult positive in the ED. 


- She was seen by GI in 2/2017 and had an EGD with dilation for complaints of 

dysphagia and odynophagia. The EGD noted gastritis/superficial 


 ulceration of the antrum, esophagitis in the distal esophagus, and food bolus 

in the stomach. 


- Pt was planned for outpt evaluation with colonoscopy which had not been 

performed yet. 


- Pt has family hx of colon cancer, father. 


- She has received 2 units of PRBCs at admission with H/H improving to Hgb 9.1/

Hct 26.9 (3/8/18)


- GI following


- Pt had EGD today which was negative. 


- Pt refused the colonoscopy 


- Monitor H/H 


- PPI


- Supportive care





(3) Fever


ICD Codes:  R50.9 - Fever, unspecified


Status:  Acute


Plan:  


- Pt has had reported night sweats and was noted to have fevers prior to 

admission, Tmax 103 degrees


- Blood cultures taken in the ED with NGTD


- UA was negative


-  tumor fever or related to malignancy, no obvious signs of infection


- Tylenol PRN





(4) Weight loss, unintentional


ICD Codes:  R63.4 - Abnormal weight loss


Status:  Acute


Plan:  


- Pt had reported unintentional weight loss ~20lbs since 1/2018, night sweats 

and had outpt CT Chest/Abd/Pelvis on 3/2/18


- Outpt Chest CT W/W/O IV contrast (3/2/18):


   - Innumerable masses scattered throughout the lungs. Numerous lymph nodes 

throughout the mediastinum and both hilar regions





- Outpt CT Abd/pelvis W/W/O contrast (3/2/18):


   - There is widespread metastatic disease throughout the spleen, the liver 

and the retroperitoneum. Lymphoma most likely diagnosis


    versus conceivably melanoma or breast cancer. 


   - Indeterminate sclerotic lesion in T12. 


   - Liver with 2.7cm low-density lesion in the lateral segment of the left 

lobe of the liver. At least 5 other low-density lesions scattered


    throughout the liver concerning for metastatic disease. 


   - Spleen is markedly heterogenous appearance with multiple masses. 


   - Retroperitoneum with 2.2cm left periaortic lymph node below the left renal 

vein. At least 3 other retroperitoneal lymph nodes are present. 


   - 3.0 x 1.6cm right inguinal lymph node. Some internal obturator 

lymphadenopathy on the right as well. 





- Pt underwent CT guided liver biopsy and US guided LN biopsy on 3/7/18. 

Pathology is pending. 


- AFP, CA-19-9, CEA, Ca 125 are WNL


- TSH, Free T4 are WNL


-





- She denies ever having had a mammogram previously, no reported obvious breast 

masses. She does not perform self-breast exams regularly


- She does get pap smears annually and reports hx of an abnormal PAP 3 years 

ago and underwent a LEEP procedure at that time. Her last PAP


 was in 2017 and she reports that this was normal. 


- She has family hx of colon cancer, father diagnosed in his early 70s





(5) Back pain


ICD Codes:  M54.9 - Dorsalgia, unspecified


Status:  Acute


Plan:  


- Pt has been having increased pain in her tailbone and back more recently. 


- Thoracic Spine MRI (3/8/18) --> Signal abnormalities within the T3 and T4 

vertebral body and right T4 pedicle with contrast enhancement.  There is 


 also T1 prolongation without contrast enhancement in the T8 vertebral body.  

The appearance is nonspecific but is abnormal and suggests possible


 metastatic disease. 


- Lumbar Spine MRI (3/8/18) --> No evidence of disc bulge or protrusion. 

Triangular shaped area of T2 prolongation in the anterior inferior L1 vertebral 


 body with correlating enhancement on the postcontrast study.  This of 

uncertain significance, but would be an unusual shape and location for a


 metastatic lesion.  


- Sacral spine MRI (3/8/18) --> There are signal abnormalities in the superior 

S2 and bilateral sacroiliac with T2 prolongation and contrast enhancement.


  This suggests the possibility of metastatic lesions.





(6) Liver mass


ICD Codes:  R16.0 - Hepatomegaly, not elsewhere classified











Pedro Le MD Mar 10, 2018 08:07

## 2018-03-11 VITALS
RESPIRATION RATE: 18 BRPM | DIASTOLIC BLOOD PRESSURE: 74 MMHG | SYSTOLIC BLOOD PRESSURE: 105 MMHG | HEART RATE: 77 BPM | OXYGEN SATURATION: 100 % | TEMPERATURE: 97.4 F

## 2018-03-11 VITALS
HEART RATE: 92 BPM | RESPIRATION RATE: 20 BRPM | DIASTOLIC BLOOD PRESSURE: 65 MMHG | OXYGEN SATURATION: 98 % | TEMPERATURE: 98.6 F | SYSTOLIC BLOOD PRESSURE: 102 MMHG

## 2018-03-11 VITALS
RESPIRATION RATE: 20 BRPM | OXYGEN SATURATION: 100 % | TEMPERATURE: 97.8 F | DIASTOLIC BLOOD PRESSURE: 71 MMHG | HEART RATE: 78 BPM | SYSTOLIC BLOOD PRESSURE: 101 MMHG

## 2018-03-11 VITALS
SYSTOLIC BLOOD PRESSURE: 97 MMHG | DIASTOLIC BLOOD PRESSURE: 59 MMHG | HEART RATE: 88 BPM | RESPIRATION RATE: 16 BRPM | OXYGEN SATURATION: 99 % | TEMPERATURE: 97.8 F

## 2018-03-11 VITALS — HEART RATE: 100 BPM

## 2018-03-11 VITALS — TEMPERATURE: 99.5 F

## 2018-03-11 VITALS
TEMPERATURE: 101.1 F | OXYGEN SATURATION: 100 % | HEART RATE: 115 BPM | RESPIRATION RATE: 17 BRPM | SYSTOLIC BLOOD PRESSURE: 113 MMHG | DIASTOLIC BLOOD PRESSURE: 60 MMHG

## 2018-03-11 VITALS
DIASTOLIC BLOOD PRESSURE: 62 MMHG | RESPIRATION RATE: 16 BRPM | HEART RATE: 95 BPM | OXYGEN SATURATION: 100 % | TEMPERATURE: 98.5 F | SYSTOLIC BLOOD PRESSURE: 105 MMHG

## 2018-03-11 VITALS — HEART RATE: 92 BPM

## 2018-03-11 VITALS — HEART RATE: 101 BPM

## 2018-03-11 LAB
ALBUMIN SERPL-MCNC: 2.2 GM/DL (ref 3.4–5)
ALP SERPL-CCNC: 242 U/L (ref 45–117)
ALT SERPL-CCNC: 18 U/L (ref 10–53)
AST SERPL-CCNC: 18 U/L (ref 15–37)
BASOPHILS # BLD AUTO: 0 TH/MM3 (ref 0–0.2)
BASOPHILS NFR BLD: 0.1 % (ref 0–2)
BILIRUB SERPL-MCNC: 0.4 MG/DL (ref 0.2–1)
BUN SERPL-MCNC: 8 MG/DL (ref 7–18)
CALCIUM SERPL-MCNC: 8.6 MG/DL (ref 8.5–10.1)
CHLORIDE SERPL-SCNC: 104 MEQ/L (ref 98–107)
CREAT SERPL-MCNC: 0.55 MG/DL (ref 0.5–1)
EOSINOPHIL # BLD: 0 TH/MM3 (ref 0–0.4)
EOSINOPHIL NFR BLD: 0.1 % (ref 0–4)
ERYTHROCYTE [DISTWIDTH] IN BLOOD BY AUTOMATED COUNT: 18.7 % (ref 11.6–17.2)
GFR SERPLBLD BASED ON 1.73 SQ M-ARVRAT: 123 ML/MIN (ref 89–?)
GLUCOSE SERPL-MCNC: 115 MG/DL (ref 74–106)
HCO3 BLD-SCNC: 25.9 MEQ/L (ref 21–32)
HCT VFR BLD CALC: 27 % (ref 35–46)
HGB BLD-MCNC: 9 GM/DL (ref 11.6–15.3)
LYMPHOCYTES # BLD AUTO: 0.3 TH/MM3 (ref 1–4.8)
LYMPHOCYTES NFR BLD AUTO: 2.9 % (ref 9–44)
MCH RBC QN AUTO: 25.9 PG (ref 27–34)
MCHC RBC AUTO-ENTMCNC: 33.5 % (ref 32–36)
MCV RBC AUTO: 77.5 FL (ref 80–100)
MONOCYTE #: 0.2 TH/MM3 (ref 0–0.9)
MONOCYTES NFR BLD: 2.1 % (ref 0–8)
NEUTROPHILS # BLD AUTO: 9.5 TH/MM3 (ref 1.8–7.7)
NEUTROPHILS NFR BLD AUTO: 94.8 % (ref 16–70)
PLATELET # BLD: 652 TH/MM3 (ref 150–450)
PMV BLD AUTO: 6.5 FL (ref 7–11)
PROT SERPL-MCNC: 7.1 GM/DL (ref 6.4–8.2)
RBC # BLD AUTO: 3.49 MIL/MM3 (ref 4–5.3)
SODIUM SERPL-SCNC: 139 MEQ/L (ref 136–145)
WBC # BLD AUTO: 10 TH/MM3 (ref 4–11)

## 2018-03-11 RX ADMIN — HYDROMORPHONE HYDROCHLORIDE PRN MG: 2 INJECTION INTRAMUSCULAR; INTRAVENOUS; SUBCUTANEOUS at 21:59

## 2018-03-11 RX ADMIN — STANDARDIZED SENNA CONCENTRATE AND DOCUSATE SODIUM SCH TAB: 8.6; 5 TABLET, FILM COATED ORAL at 08:59

## 2018-03-11 RX ADMIN — HYDROMORPHONE HYDROCHLORIDE PRN MG: 2 INJECTION INTRAMUSCULAR; INTRAVENOUS; SUBCUTANEOUS at 14:35

## 2018-03-11 RX ADMIN — HYDROMORPHONE HYDROCHLORIDE PRN MG: 2 INJECTION INTRAMUSCULAR; INTRAVENOUS; SUBCUTANEOUS at 18:45

## 2018-03-11 RX ADMIN — GABAPENTIN SCH MG: 300 CAPSULE ORAL at 09:00

## 2018-03-11 RX ADMIN — MORPHINE SULFATE SCH MG: 30 TABLET, EXTENDED RELEASE ORAL at 09:00

## 2018-03-11 RX ADMIN — DEXAMETHASONE SODIUM PHOSPHATE SCH MG: 4 INJECTION, SOLUTION INTRAMUSCULAR; INTRAVENOUS at 06:19

## 2018-03-11 RX ADMIN — GABAPENTIN SCH MG: 300 CAPSULE ORAL at 18:48

## 2018-03-11 RX ADMIN — STANDARDIZED SENNA CONCENTRATE AND DOCUSATE SODIUM SCH TAB: 8.6; 5 TABLET, FILM COATED ORAL at 20:14

## 2018-03-11 RX ADMIN — GABAPENTIN SCH MG: 300 CAPSULE ORAL at 12:46

## 2018-03-11 RX ADMIN — MORPHINE SULFATE SCH MG: 30 TABLET, EXTENDED RELEASE ORAL at 18:48

## 2018-03-11 RX ADMIN — Medication SCH ML: at 09:00

## 2018-03-11 RX ADMIN — HYDROMORPHONE HYDROCHLORIDE PRN MG: 2 INJECTION INTRAMUSCULAR; INTRAVENOUS; SUBCUTANEOUS at 06:25

## 2018-03-11 RX ADMIN — MAGNESIUM HYDROXIDE PRN ML: 400 SUSPENSION ORAL at 09:17

## 2018-03-11 RX ADMIN — DEXAMETHASONE SODIUM PHOSPHATE SCH MG: 4 INJECTION, SOLUTION INTRAMUSCULAR; INTRAVENOUS at 00:18

## 2018-03-11 RX ADMIN — ALLOPURINOL SCH MG: 300 TABLET ORAL at 08:59

## 2018-03-11 RX ADMIN — MORPHINE SULFATE SCH MG: 30 TABLET, EXTENDED RELEASE ORAL at 03:22

## 2018-03-11 RX ADMIN — DEXAMETHASONE SODIUM PHOSPHATE SCH MG: 4 INJECTION, SOLUTION INTRAMUSCULAR; INTRAVENOUS at 18:46

## 2018-03-11 RX ADMIN — HYDROMORPHONE HYDROCHLORIDE PRN MG: 2 INJECTION INTRAMUSCULAR; INTRAVENOUS; SUBCUTANEOUS at 00:47

## 2018-03-11 RX ADMIN — Medication SCH ML: at 14:35

## 2018-03-11 RX ADMIN — HYDROMORPHONE HYDROCHLORIDE PRN MG: 2 INJECTION INTRAMUSCULAR; INTRAVENOUS; SUBCUTANEOUS at 10:33

## 2018-03-11 RX ADMIN — DEXAMETHASONE SODIUM PHOSPHATE SCH MG: 4 INJECTION, SOLUTION INTRAMUSCULAR; INTRAVENOUS at 12:46

## 2018-03-11 NOTE — HHI.PR
Subjective


Remarks


long discussion with pt/mother....they are contemplating [facility]


evaluation. still c/o of alot of burning/itching diffusely. no help


from bendadyl or atarax.





Objective


Vitals


heart reg


lung cta


abd s/nt


ext no edema


scratching at skin/scalp.


Vital Signs








  Date Time  Temp Pulse Resp B/P (MAP) Pulse Ox O2 Delivery O2 Flow Rate FiO2


 


3/11/18 04:48 97.8 88 16 97/59 (72) 99   


 


3/11/18 04:00  92      


 


3/11/18 00:46 99.5       


 


3/11/18 00:00 101.1 115 17 113/60 (77) 100   


 


3/11/18 00:00  127      


 


3/10/18 20:50 97.5 108 17 120/68 (85) 100   


 


3/10/18 20:00  94      


 


3/10/18 18:12   18     


 


3/10/18 17:34  105      


 


3/10/18 16:15 102.7 105 20 111/69 (83) 100   


 


3/10/18 12:37 99.8 104 16 102/58 (73) 100   








Result Diagram:  


3/11/18 0541                                                                   

             3/11/18 0541





Imaging





Last Impressions








Thoracic Spine MRI 3/8/18 0000 Signed





Impressions: 





 Service Date/Time:  Thursday, March 8, 2018 17:55 - CONCLUSION:  Signal 





 abnormalities within the T3 and T4 vertebral body and right T4 pedicle with 





 contrast enhancement.  There is also T1 prolongation without contrast 





 enhancement in the T8 vertebral body.  The appearance is nonspecific but is 





 abnormal and suggests possible metastatic disease.     Roby Matos MD 


 


Sacrum/Coccyx MRI 3/8/18 0000 Signed





Impressions: 





 Service Date/Time:  Thursday, March 8, 2018 17:55 - CONCLUSION:  There are 





 signal abnormalities in the superior S2 and bilateral sacroiliac with T2 





 prolongation and contrast enhancement.  This suggests the possibility of 





 metastatic lesions.     Roby Matos MD 


 


Lumbar Spine MRI 3/8/18 0000 Signed





Impressions: 





 Service Date/Time:  Thursday, March 8, 2018 17:55 - CONCLUSION:  1. No 

evidence 





 of disc bulge or protrusion. 2. Triangular shaped area of T2 prolongation in 

the 





 anterior inferior L1 vertebral body with correlating enhancement on the 





 postcontrast study.  This of uncertain significance, but would be an unusual 





 shape and location for a metastatic lesion.     Roby Matos MD 


 


Abdomen X-Ray 3/8/18 0000 Signed





Impressions: 





 Service Date/Time:  Thursday, March 8, 2018 15:26 - CONCLUSION:  Moderate 

stool 





 throughout the colon. Otherwise, benign-appearing abdomen.     Enrico Russell MD 


 


Hip and Pelvis X-Ray 3/7/18 1854 Signed





Impressions: 





 Service Date/Time:  Wednesday, March 7, 2018 19:11 - CONCLUSION:  Negative 





 evaluation of the left hip.     Roby Matos MD 











Outpt Chest CT W/W/O IV contrast (3/2/18):


- Innumerable masses scattered throughout the lungs. Numerous lymph nodes 

throughout the mediastinum and both hilar regions





Outpt CT Abd/pelvis W/W/O contrast (3/2/18):


- There is widespread metastatic disease throughout the spleen, the liver and 

the retroperitoneum. Lymphoma most likely diagnosis versus conceivably melanoma 

or breast cancer. 


- Indeterminate sclerotic lesion in T12. 


- Liver with 2.7cm low-density lesion in the lateral segment of the left lobe 

of the liver. At least 5 other low-density lesions scattered throughout the 

liver concerning for metastatic disease. 


- Spleen is markedly heterogenous appearance with multiple masses. 


- Retroperitoneum with 2.2cm left periaortic lymph node below the left renal 

vein. At least 3 other retroperitoneal lymph nodes are present. 


- 3.0 x 1.6cm right inguinal lymph node. Some internal obturator 

lymphadenopathy on the right as well.





A/P


Problem List:  


(1) Hodgkin lymphoma


ICD Codes:  C81.90 - Hodgkin lymphoma, unspecified, unspecified site


Status:  Acute


Plan:  - Pt had reported unintentional weight loss ~20lbs since 1/2018, night 

sweats and had outpt CT Chest/Abd/Pelvis on 3/2/18


- Outpt Chest CT W/W/O IV contrast (3/2/18):


   - Innumerable masses scattered throughout the lungs. Numerous lymph nodes 

throughout the mediastinum and both hilar regions





- Outpt CT Abd/pelvis W/W/O contrast (3/2/18):


   - There is widespread metastatic disease throughout the spleen, the liver 

and the retroperitoneum. Lymphoma most likely diagnosis


    versus conceivably melanoma or breast cancer. 


   - Indeterminate sclerotic lesion in T12. 


   - Liver with 2.7cm low-density lesion in the lateral segment of the left 

lobe of the liver. At least 5 other low-density lesions scattered


    throughout the liver concerning for metastatic disease. 


   - Spleen is markedly heterogenous appearance with multiple masses. 


   - Retroperitoneum with 2.2cm left periaortic lymph node below the left renal 

vein. At least 3 other retroperitoneal lymph nodes are present. 


   - 3.0 x 1.6cm right inguinal lymph node. Some internal obturator 

lymphadenopathy on the right as well. 





- Pt underwent CT guided liver biopsy and US guided LN biopsy on 3/7/18.


- AFP, CA-19-9, CEA, Ca 125 are WNL


- TSH, Free T4 are WNL





- Pt has been having increased pain in her tailbone and back more recently. 


- Thoracic Spine MRI (3/8/18) --> Signal abnormalities within the T3 and T4 

vertebral body and right T4 pedicle with contrast enhancement.  There is 


 also T1 prolongation without contrast enhancement in the T8 vertebral body.  

The appearance is nonspecific but is abnormal and suggests possible


 metastatic disease. 


- Lumbar Spine MRI (3/8/18) --> No evidence of disc bulge or protrusion. 

Triangular shaped area of T2 prolongation in the anterior inferior L1 vertebral 


 body with correlating enhancement on the postcontrast study.  This of 

uncertain significance, but would be an unusual shape and location for a


 metastatic lesion.  


- Sacral spine MRI (3/8/18) --> There are signal abnormalities in the superior 

S2 and bilateral sacroiliac with T2 prolongation and contrast enhancement.


  This suggests the possibility of metastatic lesions.





-Pathology from lymph nodes shows classic Hodgkin lymphoma. liver bx 

nondiagnostic





-Consulted Dr Kennedy. He has discussed with pt and family and chemotherapy 

planned immediately


-cont pain control and titrate as needed. On ms rosa, norco, iv dilaudid 

prn. ?trial neurontin. decadron started.


-await further oncology plans. orders for echo/pft/port noted


-If family wants [place] sara...will plan to fax records tomorrow and then discuss 

with [facility] 








(2) Symptomatic anemia


ICD Codes:  D64.9 - Anemia, unspecified


Status:  Acute


Plan:  


- Pt is a 39 y/o female who was sent to the ED by her PCP for worsening anemia 

and leukocytosis on outpt labs. 


- Pt reported various complaints at the time of admission, including weight 

loss (Approx 20lbs in the last 3 months), intermittent dizziness, 


 fevers, "bone pain", night sweats. 


- Pts outpt H/H has decreased to Hgb 7.4/Hct 22.8, MCV 77, MCH 25 on 3/6/18 and 

was recommended to report to the ED for further eval. 


- Pt had previously reported intermittent black stools and was noted to be 

Hemoccult positive in the ED. 


- She was seen by GI in 2/2017 and had an EGD with dilation for complaints of 

dysphagia and odynophagia. The EGD noted gastritis/superficial 


 ulceration of the antrum, esophagitis in the distal esophagus, and food bolus 

in the stomach. 


- Pt was planned for outpt evaluation with colonoscopy which had not been 

performed yet. 


- Pt has family hx of colon cancer, father. 


- She has received 2 units of PRBCs at admission with H/H improving to Hgb 9.1/

Hct 26.9 (3/8/18)


- GI following


- Pt had EGD today which was negative. 


- Pt refused the colonoscopy 


- Monitor H/H 


- PPI


- Supportive care





(3) Fever


ICD Codes:  R50.9 - Fever, unspecified


Status:  Acute


Plan:  


- Pt has had reported night sweats and was noted to have fevers prior to 

admission, Tmax 103 degrees


- Blood cultures taken in the ED with NGTD


- UA was negative


-  tumor fever or related to malignancy, no obvious signs of infection


- Tylenol PRN





(4) Weight loss, unintentional


ICD Codes:  R63.4 - Abnormal weight loss


Status:  Acute


Plan:  


- Pt had reported unintentional weight loss ~20lbs since 1/2018, night sweats 

and had outpt CT Chest/Abd/Pelvis on 3/2/18


- Outpt Chest CT W/W/O IV contrast (3/2/18):


   - Innumerable masses scattered throughout the lungs. Numerous lymph nodes 

throughout the mediastinum and both hilar regions





- Outpt CT Abd/pelvis W/W/O contrast (3/2/18):


   - There is widespread metastatic disease throughout the spleen, the liver 

and the retroperitoneum. Lymphoma most likely diagnosis


    versus conceivably melanoma or breast cancer. 


   - Indeterminate sclerotic lesion in T12. 


   - Liver with 2.7cm low-density lesion in the lateral segment of the left 

lobe of the liver. At least 5 other low-density lesions scattered


    throughout the liver concerning for metastatic disease. 


   - Spleen is markedly heterogenous appearance with multiple masses. 


   - Retroperitoneum with 2.2cm left periaortic lymph node below the left renal 

vein. At least 3 other retroperitoneal lymph nodes are present. 


   - 3.0 x 1.6cm right inguinal lymph node. Some internal obturator 

lymphadenopathy on the right as well. 





- Pt underwent CT guided liver biopsy and US guided LN biopsy on 3/7/18. 

Pathology is pending. 


- AFP, CA-19-9, CEA, Ca 125 are WNL


- TSH, Free T4 are WNL


-





- She denies ever having had a mammogram previously, no reported obvious breast 

masses. She does not perform self-breast exams regularly


- She does get pap smears annually and reports hx of an abnormal PAP 3 years 

ago and underwent a LEEP procedure at that time. Her last PAP


 was in 2017 and she reports that this was normal. 


- She has family hx of colon cancer, father diagnosed in his early 70s





(5) Back pain


ICD Codes:  M54.9 - Dorsalgia, unspecified


Status:  Acute


Plan:  


- Pt has been having increased pain in her tailbone and back more recently. 


- Thoracic Spine MRI (3/8/18) --> Signal abnormalities within the T3 and T4 

vertebral body and right T4 pedicle with contrast enhancement.  There is 


 also T1 prolongation without contrast enhancement in the T8 vertebral body.  

The appearance is nonspecific but is abnormal and suggests possible


 metastatic disease. 


- Lumbar Spine MRI (3/8/18) --> No evidence of disc bulge or protrusion. 

Triangular shaped area of T2 prolongation in the anterior inferior L1 vertebral 


 body with correlating enhancement on the postcontrast study.  This of 

uncertain significance, but would be an unusual shape and location for a


 metastatic lesion.  


- Sacral spine MRI (3/8/18) --> There are signal abnormalities in the superior 

S2 and bilateral sacroiliac with T2 prolongation and contrast enhancement.


  This suggests the possibility of metastatic lesions.





(6) Liver mass


ICD Codes:  R16.0 - Hepatomegaly, not elsewhere classified











Pedro Le MD Mar 11, 2018 09:01

## 2018-03-11 NOTE — PD.ONC.PN
Subjective


Subjective


Remarks


Tmax 101.1 overnight


Patient complaining of tingling and burning


Anxious about upcoming chemotherapy


Patient asking about options to see a female oncologist or possible transfer to 

[place]





Objective


Data











  Date Time  Temp Pulse Resp B/P (MAP) Pulse Ox O2 Delivery O2 Flow Rate FiO2


 


3/11/18 04:48 97.8 88 16 97/59 (72) 99   


 


3/11/18 04:00  92      


 


3/11/18 00:46 99.5       


 


3/11/18 00:00 101.1 115 17 113/60 (77) 100   


 


3/11/18 00:00  127      


 


3/10/18 20:50 97.5 108 17 120/68 (85) 100   


 


3/10/18 20:00  94      


 


3/10/18 18:12   18     


 


3/10/18 17:34  105      


 


3/10/18 16:15 102.7 105 20 111/69 (83) 100   


 


3/10/18 12:37 99.8 104 16 102/58 (73) 100   














 3/11/18 3/11/18 3/11/18





 07:00 15:00 23:00


 


Intake Total 240 ml  


 


Balance 240 ml  








Result Diagram:  


3/11/18 0541                                                                   

             3/11/18 0541





Laboratory Results





Laboratory Tests








Test


  3/11/18


05:41


 


White Blood Count 10.0 TH/MM3 


 


Red Blood Count 3.49 MIL/MM3 


 


Hemoglobin 9.0 GM/DL 


 


Hematocrit 27.0 % 


 


Mean Corpuscular Volume 77.5 FL 


 


Mean Corpuscular Hemoglobin 25.9 PG 


 


Mean Corpuscular Hemoglobin


Concent 33.5 % 


 


 


Red Cell Distribution Width 18.7 % 


 


Platelet Count 652 TH/MM3 


 


Mean Platelet Volume 6.5 FL 


 


Neutrophils (%) (Auto) 94.8 % 


 


Lymphocytes (%) (Auto) 2.9 % 


 


Monocytes (%) (Auto) 2.1 % 


 


Eosinophils (%) (Auto) 0.1 % 


 


Basophils (%) (Auto) 0.1 % 


 


Neutrophils # (Auto) 9.5 TH/MM3 


 


Lymphocytes # (Auto) 0.3 TH/MM3 


 


Monocytes # (Auto) 0.2 TH/MM3 


 


Eosinophils # (Auto) 0.0 TH/MM3 


 


Basophils # (Auto) 0.0 TH/MM3 


 


CBC Comment DIFF FINAL 


 


Differential Comment  


 


Blood Urea Nitrogen 8 MG/DL 


 


Creatinine 0.55 MG/DL 


 


Random Glucose 115 MG/DL 


 


Total Protein 7.1 GM/DL 


 


Albumin 2.2 GM/DL 


 


Calcium Level 8.6 MG/DL 


 


Uric Acid 3.4 MG/DL 


 


Alkaline Phosphatase 242 U/L 


 


Aspartate Amino Transf


(AST/SGOT) 18 U/L 


 


 


Alanine Aminotransferase


(ALT/SGPT) 18 U/L 


 


 


Total Bilirubin 0.4 MG/DL 


 


Sodium Level 139 MEQ/L 


 


Potassium Level 4.0 MEQ/L 


 


Chloride Level 104 MEQ/L 


 


Carbon Dioxide Level 25.9 MEQ/L 


 


Anion Gap 9 MEQ/L 


 


Estimat Glomerular Filtration


Rate 123 ML/MIN 


 











Administered Medications








 Medications


  (Trade)  Dose


 Ordered  Sig/Nohemi


 Route


 PRN Reason  Start Time


 Stop Time Status Last Admin


Dose Admin


 


 Sodium Chloride


  (NS Flush)  2 ml  BID


 IV FLUSH


   3/8/18 09:00


    3/10/18 21:02


 


 


 Acetaminophen


  (Tylenol)  650 mg  Q4H  PRN


 PO


 TEMP > 100.4  3/7/18 21:30


    3/10/18 23:25


 


 


 Senna/Docusate


 Sodium


  (Thelma-Colace)  1 tab  BID


 PO


   3/8/18 09:00


    3/10/18 21:02


 


 


 Lactulose


  (Lactulose Liq)  30 ml  DAILY  PRN


 PO


 SEVERE CONSITIPATION  3/7/18 21:30


    3/8/18 16:22


 


 


 Hydroxyzine HCl


  (Atarax)  10 mg  Q8H  PRN


 PO


 itching  3/7/18 21:45


    3/10/18 22:37


 


 


 Acetaminophen/


 Hydrocodone Bitart


  (Norco  5-325 Mg)  1 tab  Q4H  PRN


 PO


 pain 2-5  3/8/18 15:15


    3/9/18 19:50


 


 


 Lactated Ringer's  1,000 ml @ 


 30 mls/hr  Q24H PRN


 IV


 SEE LABEL COMMENTS  3/9/18 06:45


 3/12/18 06:44  3/9/18 07:57


 


 


 Hydromorphone HCl


  (Dilaudid Pf Inj)  2 mg  Q4H  PRN


 IV PUSH


 pain 6-10  3/9/18 22:15


    3/11/18 06:25


 


 


 Morphine Sulfate


  (Oramorph Sr)  30 mg  Q8H


 PO


   3/10/18 18:00


    3/11/18 03:22


 


 


 Dexamethasone


 Sodium Phosphate


  (Decadron Inj)  4 mg  Q6HR


 IV PUSH


   3/11/18 00:00


    3/11/18 06:19


 








Objective Remarks


GENERAL: Thin young female sitting up in bed talking with mother at bedside


SKIN: Warm and dry.


HEAD: Normocephalic.


EYES: No injection or drainage. 


NECK: Supple, trachea midline. No JVD or lymphadenopathy.


LYMPHATIC: Left supraclavicular shoddy lymph node


CARDIOVASCULAR: Regular rate and rhythm without murmurs. 


RESPIRATORY: Clear posteriorly.  Breathing unlabored at rest.


GASTROINTESTINAL: Abdomen soft, non-tender, nondistended. 


EXTREMITIES: No cyanosis, or edema. 


MUSCULOSKELETAL: Adequate muscle tone.


NEUROLOGICAL: No obvious focal deficit. Awake, alert, and oriented x3.





Assessment/Plan


Problem List:  


(1) Hodgkin lymphoma


ICD Codes:  C81.90 - Hodgkin lymphoma, unspecified, unspecified site


Status:  Acute


Plan:  3/11/18: Unfortunately we will need to wait for pulmonary function test 

to begin chemo as there are potential pulmonary toxicities. Infusaport 

placement in am. Chemo to begin likely tomorrow. Continue allopurinol.





--HIV test pending


--Pulmonary function test pending


--Echocardiogram shows EF of 65-70%








Hx/Workup: The patient originally became symptomatic in December 2017 with 

pneumonia like symptoms.  She states that she had fevers with drenching night 

sweats.  She is also had a 20 pound weight loss in the last 4 months.  An 

outpatient CT scan of the chest abdomen and pelvis showed widespread 

lymphadenopathy, multiple lesions in the lung spleen and liver.  The patient 

had a biopsy on 3/7/18 of a right inguinal lymph node that showed classical 

Hodgkin's lymphoma.








(2) Back pain


ICD Codes:  M54.9 - Dorsalgia, unspecified


Status:  Acute


Plan:  --Patient on 30 mg Oramorph every 8 hours


--Also has Dilaudid for breakthrough pain


--Start gabapentin for generalized tingling and burning





Assessment


37 y/o female admitted with severe back pain with a new diagnosis of classical 

Hodgkin's lymphoma


Attending Statement


The exam, history, and the medical decision-making described in the above note 

were completed with the assistance of the mid-level provider. I reviewed and 

agree with the findings presented.  I attest that I had a face-to-face 

encounter with the patient on the same day, and personally performed and 

documented my assessment and findings in the medical record.





Patient is complaining of generalized itching and burning.


The tail bone pain has resolved But now she's complaining of bilateral rib cage 

pain.


Consult IR for port.


Continue allopurinol, decadron and narcotics,


PFT with DLCO tomorrow ( not offered on the weekend). If it is adequate then 

start ABVD chemo.


multiple questions from family, i answered them.


She is requesting female oncologist. Will ask Dr Chester to see her tomorrow.


DR Oseiite to make arrangement to Tx to [place] per pt and family request.


HIV is pending.











Judy Navarro Mar 11, 2018 09:12


Mary Kennedy MD Mar 11, 2018 22:24

## 2018-03-12 VITALS
HEART RATE: 92 BPM | RESPIRATION RATE: 18 BRPM | OXYGEN SATURATION: 100 % | SYSTOLIC BLOOD PRESSURE: 118 MMHG | DIASTOLIC BLOOD PRESSURE: 74 MMHG | TEMPERATURE: 97.5 F

## 2018-03-12 VITALS
RESPIRATION RATE: 18 BRPM | HEART RATE: 66 BPM | OXYGEN SATURATION: 100 % | DIASTOLIC BLOOD PRESSURE: 49 MMHG | SYSTOLIC BLOOD PRESSURE: 94 MMHG | TEMPERATURE: 98.4 F

## 2018-03-12 VITALS
HEART RATE: 92 BPM | TEMPERATURE: 97.5 F | DIASTOLIC BLOOD PRESSURE: 74 MMHG | RESPIRATION RATE: 20 BRPM | OXYGEN SATURATION: 100 % | SYSTOLIC BLOOD PRESSURE: 118 MMHG

## 2018-03-12 VITALS
OXYGEN SATURATION: 100 % | TEMPERATURE: 97.7 F | DIASTOLIC BLOOD PRESSURE: 73 MMHG | HEART RATE: 73 BPM | SYSTOLIC BLOOD PRESSURE: 118 MMHG | RESPIRATION RATE: 17 BRPM

## 2018-03-12 VITALS
RESPIRATION RATE: 16 BRPM | OXYGEN SATURATION: 100 % | HEART RATE: 72 BPM | DIASTOLIC BLOOD PRESSURE: 71 MMHG | TEMPERATURE: 98.5 F | SYSTOLIC BLOOD PRESSURE: 108 MMHG

## 2018-03-12 VITALS
HEART RATE: 86 BPM | RESPIRATION RATE: 20 BRPM | OXYGEN SATURATION: 96 % | SYSTOLIC BLOOD PRESSURE: 103 MMHG | DIASTOLIC BLOOD PRESSURE: 46 MMHG

## 2018-03-12 VITALS — HEART RATE: 90 BPM

## 2018-03-12 VITALS
RESPIRATION RATE: 20 BRPM | SYSTOLIC BLOOD PRESSURE: 116 MMHG | HEART RATE: 70 BPM | DIASTOLIC BLOOD PRESSURE: 75 MMHG | OXYGEN SATURATION: 100 % | TEMPERATURE: 96.9 F

## 2018-03-12 VITALS — HEART RATE: 82 BPM

## 2018-03-12 VITALS — HEART RATE: 79 BPM

## 2018-03-12 VITALS — HEART RATE: 72 BPM

## 2018-03-12 VITALS
TEMPERATURE: 97.8 F | HEART RATE: 92 BPM | RESPIRATION RATE: 18 BRPM | SYSTOLIC BLOOD PRESSURE: 110 MMHG | DIASTOLIC BLOOD PRESSURE: 73 MMHG | OXYGEN SATURATION: 100 %

## 2018-03-12 VITALS
DIASTOLIC BLOOD PRESSURE: 83 MMHG | RESPIRATION RATE: 18 BRPM | TEMPERATURE: 97.3 F | SYSTOLIC BLOOD PRESSURE: 121 MMHG | HEART RATE: 70 BPM

## 2018-03-12 VITALS
HEART RATE: 84 BPM | SYSTOLIC BLOOD PRESSURE: 104 MMHG | DIASTOLIC BLOOD PRESSURE: 53 MMHG | OXYGEN SATURATION: 96 % | RESPIRATION RATE: 18 BRPM

## 2018-03-12 VITALS
HEART RATE: 96 BPM | RESPIRATION RATE: 18 BRPM | OXYGEN SATURATION: 96 % | SYSTOLIC BLOOD PRESSURE: 96 MMHG | DIASTOLIC BLOOD PRESSURE: 50 MMHG

## 2018-03-12 VITALS
RESPIRATION RATE: 20 BRPM | HEART RATE: 97 BPM | OXYGEN SATURATION: 95 % | SYSTOLIC BLOOD PRESSURE: 97 MMHG | DIASTOLIC BLOOD PRESSURE: 45 MMHG

## 2018-03-12 VITALS — HEART RATE: 62 BPM

## 2018-03-12 VITALS — HEART RATE: 76 BPM

## 2018-03-12 VITALS
RESPIRATION RATE: 16 BRPM | HEART RATE: 75 BPM | TEMPERATURE: 97.5 F | DIASTOLIC BLOOD PRESSURE: 76 MMHG | SYSTOLIC BLOOD PRESSURE: 114 MMHG | OXYGEN SATURATION: 99 %

## 2018-03-12 VITALS — HEART RATE: 86 BPM

## 2018-03-12 VITALS — HEART RATE: 70 BPM

## 2018-03-12 LAB
ALBUMIN SERPL-MCNC: 2.2 GM/DL (ref 3.4–5)
ALP SERPL-CCNC: 218 U/L (ref 45–117)
ALT SERPL-CCNC: 18 U/L (ref 10–53)
AST SERPL-CCNC: 12 U/L (ref 15–37)
BASOPHILS # BLD AUTO: 0 TH/MM3 (ref 0–0.2)
BASOPHILS NFR BLD: 0.2 % (ref 0–2)
BILIRUB SERPL-MCNC: 0.2 MG/DL (ref 0.2–1)
BUN SERPL-MCNC: 10 MG/DL (ref 7–18)
CALCIUM SERPL-MCNC: 9 MG/DL (ref 8.5–10.1)
CERULOPLASMIN SERPL-MCNC: 65 MG/DL (ref 18–53)
CHLORIDE SERPL-SCNC: 107 MEQ/L (ref 98–107)
CREAT SERPL-MCNC: 0.58 MG/DL (ref 0.5–1)
EOSINOPHIL # BLD: 0 TH/MM3 (ref 0–0.4)
EOSINOPHIL NFR BLD: 0 % (ref 0–4)
ERYTHROCYTE [DISTWIDTH] IN BLOOD BY AUTOMATED COUNT: 18.6 % (ref 11.6–17.2)
GFR SERPLBLD BASED ON 1.73 SQ M-ARVRAT: 116 ML/MIN (ref 89–?)
GLUCOSE SERPL-MCNC: 145 MG/DL (ref 74–106)
HCO3 BLD-SCNC: 26.9 MEQ/L (ref 21–32)
HCT VFR BLD CALC: 28.9 % (ref 35–46)
HGB BLD-MCNC: 9.5 GM/DL (ref 11.6–15.3)
LYMPHOCYTES # BLD AUTO: 0.5 TH/MM3 (ref 1–4.8)
LYMPHOCYTES NFR BLD AUTO: 2.7 % (ref 9–44)
MCH RBC QN AUTO: 25.8 PG (ref 27–34)
MCHC RBC AUTO-ENTMCNC: 32.7 % (ref 32–36)
MCV RBC AUTO: 78.9 FL (ref 80–100)
MONOCYTE #: 0.6 TH/MM3 (ref 0–0.9)
MONOCYTES NFR BLD: 3.4 % (ref 0–8)
NEUTROPHILS # BLD AUTO: 16.5 TH/MM3 (ref 1.8–7.7)
NEUTROPHILS NFR BLD AUTO: 93.7 % (ref 16–70)
PLATELET # BLD: 785 TH/MM3 (ref 150–450)
PMV BLD AUTO: 6.9 FL (ref 7–11)
PROT SERPL-MCNC: 7.3 GM/DL (ref 6.4–8.2)
RBC # BLD AUTO: 3.67 MIL/MM3 (ref 4–5.3)
SODIUM SERPL-SCNC: 142 MEQ/L (ref 136–145)
WBC # BLD AUTO: 17.6 TH/MM3 (ref 4–11)

## 2018-03-12 PROCEDURE — 0JH60WZ INSERTION OF TOTALLY IMPLANTABLE VASCULAR ACCESS DEVICE INTO CHEST SUBCUTANEOUS TISSUE AND FASCIA, OPEN APPROACH: ICD-10-PCS | Performed by: RADIOLOGY

## 2018-03-12 PROCEDURE — 05HM33Z INSERTION OF INFUSION DEVICE INTO RIGHT INTERNAL JUGULAR VEIN, PERCUTANEOUS APPROACH: ICD-10-PCS | Performed by: RADIOLOGY

## 2018-03-12 RX ADMIN — HYDROMORPHONE HYDROCHLORIDE PRN MG: 2 INJECTION INTRAMUSCULAR; INTRAVENOUS; SUBCUTANEOUS at 08:57

## 2018-03-12 RX ADMIN — HYDROMORPHONE HYDROCHLORIDE PRN MG: 2 INJECTION INTRAMUSCULAR; INTRAVENOUS; SUBCUTANEOUS at 20:45

## 2018-03-12 RX ADMIN — MAGNESIUM HYDROXIDE PRN ML: 400 SUSPENSION ORAL at 22:13

## 2018-03-12 RX ADMIN — GABAPENTIN SCH MG: 300 CAPSULE ORAL at 08:53

## 2018-03-12 RX ADMIN — HYDROMORPHONE HYDROCHLORIDE PRN MG: 2 INJECTION INTRAMUSCULAR; INTRAVENOUS; SUBCUTANEOUS at 23:54

## 2018-03-12 RX ADMIN — DEXAMETHASONE SODIUM PHOSPHATE SCH MG: 4 INJECTION, SOLUTION INTRAMUSCULAR; INTRAVENOUS at 23:54

## 2018-03-12 RX ADMIN — DEXAMETHASONE SODIUM PHOSPHATE SCH MG: 4 INJECTION, SOLUTION INTRAMUSCULAR; INTRAVENOUS at 12:00

## 2018-03-12 RX ADMIN — Medication SCH ML: at 08:55

## 2018-03-12 RX ADMIN — GABAPENTIN SCH MG: 300 CAPSULE ORAL at 13:00

## 2018-03-12 RX ADMIN — DEXAMETHASONE SODIUM PHOSPHATE SCH MG: 4 INJECTION, SOLUTION INTRAMUSCULAR; INTRAVENOUS at 05:09

## 2018-03-12 RX ADMIN — Medication SCH ML: at 20:39

## 2018-03-12 RX ADMIN — MORPHINE SULFATE SCH MG: 30 TABLET, EXTENDED RELEASE ORAL at 02:22

## 2018-03-12 RX ADMIN — STANDARDIZED SENNA CONCENTRATE AND DOCUSATE SODIUM SCH TAB: 8.6; 5 TABLET, FILM COATED ORAL at 20:39

## 2018-03-12 RX ADMIN — MORPHINE SULFATE SCH MG: 30 TABLET, EXTENDED RELEASE ORAL at 16:58

## 2018-03-12 RX ADMIN — DEXAMETHASONE SODIUM PHOSPHATE SCH MG: 4 INJECTION, SOLUTION INTRAMUSCULAR; INTRAVENOUS at 16:58

## 2018-03-12 RX ADMIN — MORPHINE SULFATE SCH MG: 30 TABLET, EXTENDED RELEASE ORAL at 08:54

## 2018-03-12 RX ADMIN — HYDROMORPHONE HYDROCHLORIDE PRN MG: 2 INJECTION INTRAMUSCULAR; INTRAVENOUS; SUBCUTANEOUS at 05:17

## 2018-03-12 RX ADMIN — HYDROMORPHONE HYDROCHLORIDE PRN MG: 2 INJECTION INTRAMUSCULAR; INTRAVENOUS; SUBCUTANEOUS at 02:23

## 2018-03-12 RX ADMIN — ALLOPURINOL SCH MG: 300 TABLET ORAL at 08:53

## 2018-03-12 RX ADMIN — STANDARDIZED SENNA CONCENTRATE AND DOCUSATE SODIUM SCH TAB: 8.6; 5 TABLET, FILM COATED ORAL at 08:53

## 2018-03-12 RX ADMIN — DEXAMETHASONE SODIUM PHOSPHATE SCH MG: 4 INJECTION, SOLUTION INTRAMUSCULAR; INTRAVENOUS at 00:29

## 2018-03-12 RX ADMIN — GABAPENTIN SCH MG: 300 CAPSULE ORAL at 16:58

## 2018-03-12 RX ADMIN — HYDROMORPHONE HYDROCHLORIDE PRN MG: 2 INJECTION INTRAMUSCULAR; INTRAVENOUS; SUBCUTANEOUS at 16:57

## 2018-03-12 NOTE — RADRPT
EXAM DATE/TIME:  03/12/2018 13:03 

 

[facility] COMPARISON:  

No previous studies available for comparison.

                     

 

INDICATIONS :               

Patient with a history of hodgkins lymphoma. 

                     

 

MEDICAL HISTORY :     

Hodgkins lymphoma

 

SURGICAL HISTORY :     

Ovarian cyst removal

 

ENCOUNTER:     

Initial

 

ACUITY:     

2 days

 

PAIN SCORE:     

9/10

 

LOCATION:       

low back

                     

 

FLUORO TIME:     

0.1 minutes

 

IMAGE SERIES:      

0

 

SEDATION TIME:       

25 minutes

                     

 

ACCESS:     

Right internal jugular vein 

 

SEDATION:      

1.)  3 mg midazolam (Versed)  IV     

2.)  200 mcg fentanyl (Sublimaze)  IV     

      

Prophylactic antibiotics were administered with appropriate pre-procedure timing.     

Vancomycin within 2 hours of procedure, Ancef (or alternative) within 1 hour of procedure.     

      

 

DEVICE:      

1.  8 [place] single lumen   [id number]      

 

 

PROCEDURE :     

1.  Continuous pulse oximetry and EKG monitoring.

2.  Intravenous conscious sedation.

3.  Ultrasound guidance for venous access.

4.  Fluoroscopic guided implantable central venous port placement.

 

The patient was placed supine. The neck was prepped in sterile fashion.  Full sterile technique was u
sed, including cap, mask, sterile gloves and gown, and a large sterile sheet.  Hand hygiene and 2% ch
lorhexidine Betadine was utilized per protocol for cutaneous antisepsis with appropriate dry time for
 site.  Sterile gel and sterile probe cover were utilized for ultrasound guidance.   The skin and sub
cutaneous tissues were infiltrated with local anesthetic solution.  

 

Under direct ultrasound guidance, central venous access was accomplished in the targeted vessel.  The
 ultrasound images depicting access guidance were stored and saved to PACS for permanent record.  A s
ubcutaneous pocket was created using blunt dissection.  The port was introduced to the pocket.  The c
atheter tubing was fed through a subcutaneous tunnel to the venotomy site.  The catheter tubing was c
ut to a suitable length and then was introduced through a valved Peel-Away sheath and positioned with
 catheter tubing tip at the cavo-atrial junction level.  The pocket incision was closed with subcutic
ular Vicryl suture.  Steri-Strips were applied.  The port was flushed and locked with heparin solutio
n per protocol.  Sterile dressing was applied to the site.  The patient tolerated the procedure well.


 

Conscious sedation was performed with the prescribed dosages and duration as above in the presence of
 an independent trained radiology nurse to assist in the monitoring of the patient.  EKG and oximetry
 remained stable throughout the procedure. The patient tolerated the procedure well and there were no
 complications. The patient was sent to post anesthesia recovery in stable condition.

 

CONCLUSION:     

Uncomplicated ultrasound and fluoroscopic guided implanted central venous port catheter placement as 
described in detail above.  An 8 [place] Power port was placed.

 

 

 

 Artem Nicholas MD on March 12, 2018 at 15:36           

Board Certified Radiologist.

 This report was verified electronically.

## 2018-03-12 NOTE — HHI.PR
Subjective


Remarks


Pt just returned from PFT testing


Her pain is better controlled today





Objective


Vitals





Vital Signs








  Date Time  Temp Pulse Resp B/P (MAP) Pulse Ox O2 Delivery O2 Flow Rate FiO2


 


3/12/18 08:47 96.9 70 20 116/75 (89) 100   


 


3/12/18 08:00  76      


 


3/12/18 05:11 98.5 72 16 108/71 (83) 100   


 


3/12/18 04:00  82      


 


3/12/18 00:33 97.7 73 17 118/73 (88) 100   


 


3/12/18 00:00  74      


 


3/11/18 20:00 98.5 95 16 105/62 (76) 100   


 


3/11/18 20:00  69      


 


3/11/18 19:44  100      


 


3/11/18 16:00 97.4 77 18 105/74 (84) 100   


 


3/11/18 12:58 97.8 78 20 101/71 (81) 100   








Result Diagram:  


3/12/18 0537                                                                   

             3/12/18 0537





Other Results





 Laboratory Tests








Test


  3/11/18


05:41 3/12/18


05:37


 


White Blood Count 10.0 TH/MM3  17.6 TH/MM3 


 


Red Blood Count 3.49 MIL/MM3  3.67 MIL/MM3 


 


Hemoglobin 9.0 GM/DL  9.5 GM/DL 


 


Hematocrit 27.0 %  28.9 % 


 


Mean Corpuscular Volume 77.5 FL  78.9 FL 


 


Mean Corpuscular Hemoglobin 25.9 PG  25.8 PG 


 


Mean Corpuscular Hemoglobin


Concent 33.5 % 


  32.7 % 


 


 


Red Cell Distribution Width 18.7 %  18.6 % 


 


Platelet Count 652 TH/MM3  785 TH/MM3 


 


Mean Platelet Volume 6.5 FL  6.9 FL 


 


Neutrophils (%) (Auto) 94.8 %  93.7 % 


 


Lymphocytes (%) (Auto) 2.9 %  2.7 % 


 


Monocytes (%) (Auto) 2.1 %  3.4 % 


 


Eosinophils (%) (Auto) 0.1 %  0.0 % 


 


Basophils (%) (Auto) 0.1 %  0.2 % 


 


Neutrophils # (Auto) 9.5 TH/MM3  16.5 TH/MM3 


 


Lymphocytes # (Auto) 0.3 TH/MM3  0.5 TH/MM3 


 


Monocytes # (Auto) 0.2 TH/MM3  0.6 TH/MM3 


 


Eosinophils # (Auto) 0.0 TH/MM3  0.0 TH/MM3 


 


Basophils # (Auto) 0.0 TH/MM3  0.0 TH/MM3 


 


CBC Comment DIFF FINAL  DIFF FINAL 


 


Differential Comment    


 


Blood Urea Nitrogen 8 MG/DL  10 MG/DL 


 


Creatinine 0.55 MG/DL  0.58 MG/DL 


 


Random Glucose 115 MG/DL  145 MG/DL 


 


Total Protein 7.1 GM/DL  7.3 GM/DL 


 


Albumin 2.2 GM/DL  2.2 GM/DL 


 


Calcium Level 8.6 MG/DL  9.0 MG/DL 


 


Uric Acid 3.4 MG/DL  


 


Alkaline Phosphatase 242 U/L  218 U/L 


 


Aspartate Amino Transf


(AST/SGOT) 18 U/L 


  12 U/L 


 


 


Alanine Aminotransferase


(ALT/SGPT) 18 U/L 


  18 U/L 


 


 


Total Bilirubin 0.4 MG/DL  0.2 MG/DL 


 


Sodium Level 139 MEQ/L  142 MEQ/L 


 


Potassium Level 4.0 MEQ/L  3.8 MEQ/L 


 


Chloride Level 104 MEQ/L  107 MEQ/L 


 


Carbon Dioxide Level 25.9 MEQ/L  26.9 MEQ/L 


 


Anion Gap 9 MEQ/L  8 MEQ/L 


 


Estimat Glomerular Filtration


Rate 123 ML/MIN 


  116 ML/MIN 


 








Imaging





Last Impressions








Thoracic Spine MRI 3/8/18 0000 Signed





Impressions: 





 Service Date/Time:  Thursday, March 8, 2018 17:55 - CONCLUSION:  Signal 





 abnormalities within the T3 and T4 vertebral body and right T4 pedicle with 





 contrast enhancement.  There is also T1 prolongation without contrast 





 enhancement in the T8 vertebral body.  The appearance is nonspecific but is 





 abnormal and suggests possible metastatic disease.     Roby Matos MD 


 


Sacrum/Coccyx MRI 3/8/18 0000 Signed





Impressions: 





 Service Date/Time:  Thursday, March 8, 2018 17:55 - CONCLUSION:  There are 





 signal abnormalities in the superior S2 and bilateral sacroiliac with T2 





 prolongation and contrast enhancement.  This suggests the possibility of 





 metastatic lesions.     Roby Matos MD 


 


Lumbar Spine MRI 3/8/18 0000 Signed





Impressions: 





 Service Date/Time:  Thursday, March 8, 2018 17:55 - CONCLUSION:  1. No 

evidence 





 of disc bulge or protrusion. 2. Triangular shaped area of T2 prolongation in 

the 





 anterior inferior L1 vertebral body with correlating enhancement on the 





 postcontrast study.  This of uncertain significance, but would be an unusual 





 shape and location for a metastatic lesion.     Roby Matos MD 


 


Abdomen X-Ray 3/8/18 0000 Signed





Impressions: 





 Service Date/Time:  Thursday, March 8, 2018 15:26 - CONCLUSION:  Moderate 

stool 





 throughout the colon. Otherwise, benign-appearing abdomen.     Enrico Russell MD 


 


Hip and Pelvis X-Ray 3/7/18 1854 Signed





Impressions: 





 Service Date/Time:  Wednesday, March 7, 2018 19:11 - CONCLUSION:  Negative 





 evaluation of the left hip.     Roby Matos MD 











Outpt Chest CT W/W/O IV contrast (3/2/18):


- Innumerable masses scattered throughout the lungs. Numerous lymph nodes 

throughout the mediastinum and both hilar regions





Outpt CT Abd/pelvis W/W/O contrast (3/2/18):


- There is widespread metastatic disease throughout the spleen, the liver and 

the retroperitoneum. Lymphoma most likely diagnosis versus conceivably melanoma 

or breast cancer. 


- Indeterminate sclerotic lesion in T12. 


- Liver with 2.7cm low-density lesion in the lateral segment of the left lobe 

of the liver. At least 5 other low-density lesions scattered throughout the 

liver concerning for metastatic disease. 


- Spleen is markedly heterogenous appearance with multiple masses. 


- Retroperitoneum with 2.2cm left periaortic lymph node below the left renal 

vein. At least 3 other retroperitoneal lymph nodes are present. 


- 3.0 x 1.6cm right inguinal lymph node. Some internal obturator 

lymphadenopathy on the right as well.


Objective Remarks


General: NAD, AAOx3


Chest: CTA


Cardiac: Regular


Abd: +BS, soft ND/NT


Ext: No edema





A/P


Problem List:  


(1) Hodgkin lymphoma


ICD Codes:  C81.90 - Hodgkin lymphoma, unspecified, unspecified site


Status:  Acute


Plan:  - Pt had reported unintentional weight loss ~20lbs since 1/2018, night 

sweats and had outpt CT Chest/Abd/Pelvis on 3/2/18


- Outpt Chest CT W/W/O IV contrast (3/2/18):


   - Innumerable masses scattered throughout the lungs. Numerous lymph nodes 

throughout the mediastinum and both hilar regions





- Outpt CT Abd/pelvis W/W/O contrast (3/2/18):


   - There is widespread metastatic disease throughout the spleen, the liver 

and the retroperitoneum. Lymphoma most likely diagnosis


    versus conceivably melanoma or breast cancer. 


   - Indeterminate sclerotic lesion in T12. 


   - Liver with 2.7cm low-density lesion in the lateral segment of the left 

lobe of the liver. At least 5 other low-density lesions scattered


    throughout the liver concerning for metastatic disease. 


   - Spleen is markedly heterogenous appearance with multiple masses. 


   - Retroperitoneum with 2.2cm left periaortic lymph node below the left renal 

vein. At least 3 other retroperitoneal lymph nodes are present. 


   - 3.0 x 1.6cm right inguinal lymph node. Some internal obturator 

lymphadenopathy on the right as well. 





- Pt underwent outpt CT guided liver biopsy and US guided LN biopsy on 3/7/18.


- AFP, CA-19-9, CEA, Ca 125 are WNL


- TSH, Free T4 are WNL





- Pt has been having increased pain in her tailbone and back more recently. 


- Thoracic Spine MRI (3/8/18) --> Signal abnormalities within the T3 and T4 

vertebral body and right T4 pedicle with contrast enhancement.  There is 


 also T1 prolongation without contrast enhancement in the T8 vertebral body.  

The appearance is nonspecific but is abnormal and suggests possible


 metastatic disease. 


- Lumbar Spine MRI (3/8/18) --> No evidence of disc bulge or protrusion. 

Triangular shaped area of T2 prolongation in the anterior inferior L1 vertebral 


 body with correlating enhancement on the postcontrast study.  This of 

uncertain significance, but would be an unusual shape and location for a


 metastatic lesion.  


- Sacral spine MRI (3/8/18) --> There are signal abnormalities in the superior 

S2 and bilateral sacroiliac with T2 prolongation and contrast enhancement.


  This suggests the possibility of metastatic lesions.





- Pathology from lymph nodes shows classic Hodgkin lymphoma. liver bx 

nondiagnostic





- Consulted Dr Kennedy. He has discussed with pt and family and chemotherapy 

planned immediately. 


- Cont pain control and titrate as needed. Pt is on MS Contin 30mg Q8H, Norco 5/

325 Q4H PRN, Dilaudid IV 2mg Q3H PRN. 


- Decadron 4mg IV Q6H started on 3/11


- Trial of Neurontin 300mg TID started on 3/11


- 2D echo (3/10/18):


   - LV systolic function hyperdynamic with estimated EF 65-70%


   - Trace to mild tricuspid valve regurgitation


   - Estimated PA pressure 37.2mmHg


- Pt still needs to have PFT with DLCO to start chemo


- Orders for port placement


- Pt and family wants [place] evaluation. 


- Pt to be seen by Dr. Contreras today who will take over her care for Oncology. 





(2) Symptomatic anemia


ICD Codes:  D64.9 - Anemia, unspecified


Status:  Acute


Plan:  


- Pt is a 37 y/o female who was sent to the ED by her PCP for worsening anemia 

and leukocytosis on outpt labs. 


- Pt reported various complaints at the time of admission, including weight 

loss (Approx 20lbs in the last 3 months), intermittent dizziness, 


 fevers, "bone pain", night sweats. 


- Pts outpt H/H has decreased to Hgb 7.4/Hct 22.8, MCV 77, MCH 25 on 3/6/18 and 

was recommended to report to the ED for further eval. 


- Pt had previously reported intermittent black stools and was noted to be 

Hemoccult positive in the ED. 


- She was seen by GI in 2/2017 and had an EGD with dilation for complaints of 

dysphagia and odynophagia. The EGD noted gastritis/superficial 


 ulceration of the antrum, esophagitis in the distal esophagus, and food bolus 

in the stomach. 


- Pt was planned for outpt evaluation with colonoscopy which had not been 

performed yet. 


- Pt has family hx of colon cancer, father. 


- She has received 2 units of PRBCs at admission with H/H improving and stable


- GI following


- Pt had EGD on 3/9/18 which was negative. 


- Pt refused the colonoscopy 


- Monitor H/H 


- PPI


- Supportive care





(3) Fever


ICD Codes:  R50.9 - Fever, unspecified


Status:  Acute


Plan:  


- Pt has had reported night sweats and was noted to have fevers prior to 

admission, Tmax 103 degrees


- Blood cultures taken in the ED with NGTD


- UA was negative


- Felt to most likely be related to tumor fever or related to malignancy, no 

obvious signs of infection


- Tylenol PRN





(4) Weight loss, unintentional


ICD Codes:  R63.4 - Abnormal weight loss


Status:  Acute


Plan:  


- See above.





(5) Back pain


ICD Codes:  M54.9 - Dorsalgia, unspecified


Status:  Acute


Plan:  


- Pt has been having increased pain in her tailbone and back more recently 

likely related to bone mets. 


- See above





(6) Liver mass


ICD Codes:  R16.0 - Hepatomegaly, not elsewhere classified


Assessment and Plan


Patient examined.


Assessment and plan formulated with Renée Dykes PA-C.


I agree with the above.





Pain under better control today. 


Continue current regimen.











Renée Dykes Mar 12, 2018 10:31


Travon Cuadra DO Mar 14, 2018 11:29

## 2018-03-12 NOTE — PD.ONC.PN
Subjective


Subjective


Remarks


Afebrile overnight. 


Patient resting in bed. 


she is anxious. 


denies pain at present. 


no shortness of breath.





Objective


Data











  Date Time  Temp Pulse Resp B/P (MAP) Pulse Ox O2 Delivery O2 Flow Rate FiO2


 


3/12/18 08:47 96.9 70 20 116/75 (89) 100   


 


3/12/18 08:00  76      


 


3/12/18 05:11 98.5 72 16 108/71 (83) 100   


 


3/12/18 04:00  82      


 


3/12/18 00:33 97.7 73 17 118/73 (88) 100   


 


3/12/18 00:00  74      


 


3/11/18 20:00 98.5 95 16 105/62 (76) 100   


 


3/11/18 20:00  69      


 


3/11/18 19:44  100      


 


3/11/18 16:00 97.4 77 18 105/74 (84) 100   


 


3/11/18 12:58 97.8 78 20 101/71 (81) 100   














 3/12/18 3/12/18 3/12/18





 07:00 15:00 23:00


 


Intake Total 600 ml  


 


Balance 600 ml  








Result Diagram:  


3/12/18 0537                                                                   

             3/12/18 0537





Laboratory Results





Laboratory Tests








Test


  3/12/18


05:37


 


White Blood Count 17.6 TH/MM3 


 


Red Blood Count 3.67 MIL/MM3 


 


Hemoglobin 9.5 GM/DL 


 


Hematocrit 28.9 % 


 


Mean Corpuscular Volume 78.9 FL 


 


Mean Corpuscular Hemoglobin 25.8 PG 


 


Mean Corpuscular Hemoglobin


Concent 32.7 % 


 


 


Red Cell Distribution Width 18.6 % 


 


Platelet Count 785 TH/MM3 


 


Mean Platelet Volume 6.9 FL 


 


Neutrophils (%) (Auto) 93.7 % 


 


Lymphocytes (%) (Auto) 2.7 % 


 


Monocytes (%) (Auto) 3.4 % 


 


Eosinophils (%) (Auto) 0.0 % 


 


Basophils (%) (Auto) 0.2 % 


 


Neutrophils # (Auto) 16.5 TH/MM3 


 


Lymphocytes # (Auto) 0.5 TH/MM3 


 


Monocytes # (Auto) 0.6 TH/MM3 


 


Eosinophils # (Auto) 0.0 TH/MM3 


 


Basophils # (Auto) 0.0 TH/MM3 


 


CBC Comment DIFF FINAL 


 


Differential Comment  


 


Blood Urea Nitrogen 10 MG/DL 


 


Creatinine 0.58 MG/DL 


 


Random Glucose 145 MG/DL 


 


Total Protein 7.3 GM/DL 


 


Albumin 2.2 GM/DL 


 


Calcium Level 9.0 MG/DL 


 


Alkaline Phosphatase 218 U/L 


 


Aspartate Amino Transf


(AST/SGOT) 12 U/L 


 


 


Alanine Aminotransferase


(ALT/SGPT) 18 U/L 


 


 


Total Bilirubin 0.2 MG/DL 


 


Sodium Level 142 MEQ/L 


 


Potassium Level 3.8 MEQ/L 


 


Chloride Level 107 MEQ/L 


 


Carbon Dioxide Level 26.9 MEQ/L 


 


Anion Gap 8 MEQ/L 


 


Estimat Glomerular Filtration


Rate 116 ML/MIN 


 











Administered Medications








 Medications


  (Trade)  Dose


 Ordered  Sig/Nohemi


 Route


 PRN Reason  Start Time


 Stop Time Status Last Admin


Dose Admin


 


 Sodium Chloride


  (NS Flush)  2 ml  BID


 IV FLUSH


   3/8/18 09:00


    3/12/18 08:55


 


 


 Acetaminophen


  (Tylenol)  650 mg  Q4H  PRN


 PO


 TEMP > 100.4  3/7/18 21:30


    3/10/18 23:25


 


 


 Senna/Docusate


 Sodium


  (Thelma-Colace)  1 tab  BID


 PO


   3/8/18 09:00


    3/12/18 08:53


 


 


 Magnesium


 Hydroxide


  (Milk Of


 Magnesia Liq)  30 ml  Q12H  PRN


 PO


 Mild constipation  3/7/18 21:30


    3/11/18 09:17


 


 


 Lactulose


  (Lactulose Liq)  30 ml  DAILY  PRN


 PO


 SEVERE CONSITIPATION  3/7/18 21:30


    3/8/18 16:22


 


 


 Acetaminophen/


 Hydrocodone Bitart


  (Norco  5-325 Mg)  1 tab  Q4H  PRN


 PO


 pain 2-5  3/8/18 15:15


    3/9/18 19:50


 


 


 Morphine Sulfate


  (Oramorph Sr)  30 mg  Q8H


 PO


   3/10/18 18:00


    3/12/18 08:54


 


 


 Allopurinol


  (Zyloprim)  300 mg  DAILY


 PO


   3/11/18 09:00


    3/12/18 08:53


 


 


 Dexamethasone


 Sodium Phosphate


  (Decadron Inj)  4 mg  Q6HR


 IV PUSH


   3/11/18 00:00


    3/12/18 05:09


 


 


 Gabapentin


  (Neurontin)  300 mg  TID


 PO


   3/11/18 09:00


    3/12/18 08:53


 


 


 Hydroxyzine HCl


  (Atarax)  25 mg  Q6H


 PO


   3/11/18 09:00


    3/12/18 08:54


 


 


 Hydromorphone HCl


  (Dilaudid Pf Inj)  2 mg  Q3H  PRN


 IV PUSH


 pain 6-10  3/11/18 17:45


    3/12/18 08:57


 








Objective Remarks


GENERAL: Young woman, sitting up in bed, anxious. 


SKIN: Warm and dry.


HEAD: Normocephalic.


EYES: No injection or drainage. 


NECK: Supple, trachea midline. 


CARDIOVASCULAR: Regular rate and rhythm


RESPIRATORY: Breath sounds equal bilaterally. No accessory muscle use.


GASTROINTESTINAL: Abdomen soft, non-tender, nondistended. 


EXTREMITIES: No cyanosis


NEUROLOGICAL: awake and alert. normal speech. no obvious focal deficit.





Assessment/Plan


Problem List:  


(1) Hodgkin lymphoma


ICD Codes:  C81.90 - Hodgkin lymphoma, unspecified, unspecified site


Status:  Acute


Plan:  3/12/18: port placement this afternoon. PFTs this AM.  





--HIV test pending


--Pulmonary function test pending


--Echocardiogram shows EF of 65-70%





HPI: 4 months ago in December she had developed pneumonia-like symptoms with 

cough.  +running fever.  goes up to 103. ++drenching night sweats. lost 20 

pounds in the last 4 months. complaining of back pain and severe bone pain.  

CAT of the chest, abdomen and pelvis was done which showed lymphadenopathy, 

multiple lesions in the lung, spleen and liver. biopsy of the liver mass and 

also right inguinal lymph node=classical Hodgkin's lymphoma. 


--Dom consulted ([facility]) for possible palliative xrt to painful back lesions

: chemotherapy regimen and radiation treatment cannot be delivered at the same 

time.  If her pain is not improving, consider delivering radiation treatment 

perhaps just prior to her next chemotherapy--could try to limit her radiation 

treatment to 5 fractions.  do not believe she is a candidate for single 

fraction treatment.





(2) Back pain


ICD Codes:  M54.9 - Dorsalgia, unspecified


Status:  Acute


Plan:  --continue Oramorph + PRN dilaudid. 


--on scheduled gabapentin for generalized tingling and burning





Assessment


39 y/o female admitted with severe back pain with a new diagnosis of classical 

Hodgkin's lymphoma


Attending Statement


The exam, history, and the medical decision-making described in the above note 

were completed with the assistance of the mid-level provider. I reviewed and 

agree with the findings presented.  I attest that I had a face-to-face 

encounter with the patient on the same day, and personally performed and 

documented my assessment and findings in the medical record.





Pt seen and examined.  Discussed with Dr. Kennedy.  Pt requesting second 

opinion.





Dx. High  (5) International Prognostic Score classical HD.   5yr OS 56%, 5 yr 

FFP 42%


Albumin 2.0


Hgb <10.5


Stage IV disease


WBC >15K


ALC <500








Discussed atlength staging, radiographic finding, discussed chemotherapy common 

toxicity..  


Recommend BM bx and aspiration tomorrow.  r/o iron deficiency


Check hgb electrophoresis.


Pending results PFT, DLCO.


Anticipate starting ABVD pending results PFT.





Discussed fertility concern.


Consult w/ GYN as pt started her OC's on her own today.


Consider ovarian suppression for small chance to improve fertility options 

after chemo and control her cycles.


She would like to have children in future, it has not been the right time for 

her.





Encourage to consider going home after cycle 1 chemo with follow up as out pt.


Approval for ABVD needed as out pt.











Ira Alston Mar 12, 2018 11:59


Hyun Hemphill MD Mar 12, 2018 19:15

## 2018-03-12 NOTE — PD.RAD
Post Procedure Progress Note


Pre Procedure Diagnosis:  


(1) Hodgkin lymphoma


Post Procedure Diagnosis:  


(1) Hodgkin lymphoma


Procedure Date:


Mar 12, 2018


Supervising Radiologist:


Artem Nicholas


Proceduralist/Assist:  Dimple Goddard, RT(R), Sofia Mcdermott RT(R)(VI)


Anesthesia:  Conscious Sedation


Plan of Activity


Patient to Unit:  [facility]


Patient Condition:  Good


See PACS Report for procedural detail/treatment











Artem Nicholas MD Mar 12, 2018 14:47

## 2018-03-13 VITALS
HEART RATE: 63 BPM | SYSTOLIC BLOOD PRESSURE: 123 MMHG | OXYGEN SATURATION: 96 % | RESPIRATION RATE: 18 BRPM | DIASTOLIC BLOOD PRESSURE: 66 MMHG

## 2018-03-13 VITALS
HEART RATE: 73 BPM | TEMPERATURE: 97.3 F | OXYGEN SATURATION: 95 % | SYSTOLIC BLOOD PRESSURE: 119 MMHG | RESPIRATION RATE: 18 BRPM | DIASTOLIC BLOOD PRESSURE: 67 MMHG

## 2018-03-13 VITALS
DIASTOLIC BLOOD PRESSURE: 70 MMHG | OXYGEN SATURATION: 97 % | TEMPERATURE: 97.7 F | HEART RATE: 89 BPM | RESPIRATION RATE: 20 BRPM | SYSTOLIC BLOOD PRESSURE: 108 MMHG

## 2018-03-13 VITALS
TEMPERATURE: 98.1 F | OXYGEN SATURATION: 96 % | SYSTOLIC BLOOD PRESSURE: 107 MMHG | RESPIRATION RATE: 18 BRPM | HEART RATE: 69 BPM | DIASTOLIC BLOOD PRESSURE: 62 MMHG

## 2018-03-13 VITALS
DIASTOLIC BLOOD PRESSURE: 68 MMHG | RESPIRATION RATE: 18 BRPM | HEART RATE: 68 BPM | SYSTOLIC BLOOD PRESSURE: 110 MMHG | OXYGEN SATURATION: 96 %

## 2018-03-13 VITALS — HEART RATE: 70 BPM

## 2018-03-13 VITALS — HEART RATE: 100 BPM

## 2018-03-13 VITALS — HEART RATE: 64 BPM

## 2018-03-13 VITALS — HEART RATE: 74 BPM

## 2018-03-13 VITALS
DIASTOLIC BLOOD PRESSURE: 66 MMHG | SYSTOLIC BLOOD PRESSURE: 103 MMHG | RESPIRATION RATE: 16 BRPM | HEART RATE: 66 BPM | TEMPERATURE: 98.3 F | OXYGEN SATURATION: 97 %

## 2018-03-13 VITALS
DIASTOLIC BLOOD PRESSURE: 63 MMHG | SYSTOLIC BLOOD PRESSURE: 112 MMHG | RESPIRATION RATE: 16 BRPM | TEMPERATURE: 98.2 F | HEART RATE: 68 BPM | OXYGEN SATURATION: 98 %

## 2018-03-13 VITALS — HEART RATE: 92 BPM

## 2018-03-13 VITALS — HEART RATE: 60 BPM

## 2018-03-13 VITALS — HEART RATE: 78 BPM

## 2018-03-13 VITALS — HEART RATE: 86 BPM

## 2018-03-13 VITALS — HEART RATE: 66 BPM

## 2018-03-13 VITALS — HEART RATE: 56 BPM

## 2018-03-13 VITALS — HEART RATE: 112 BPM

## 2018-03-13 LAB
ALBUMIN SERPL-MCNC: 2.3 GM/DL (ref 3.4–5)
ALP SERPL-CCNC: 185 U/L (ref 45–117)
ALT SERPL-CCNC: 22 U/L (ref 10–53)
AST SERPL-CCNC: 15 U/L (ref 15–37)
BASOPHILS # BLD AUTO: 0 TH/MM3 (ref 0–0.2)
BASOPHILS NFR BLD: 0.1 % (ref 0–2)
BILIRUB SERPL-MCNC: 0.2 MG/DL (ref 0.2–1)
BUN SERPL-MCNC: 16 MG/DL (ref 7–18)
CALCIUM SERPL-MCNC: 8.7 MG/DL (ref 8.5–10.1)
CHLORIDE SERPL-SCNC: 105 MEQ/L (ref 98–107)
CREAT SERPL-MCNC: 0.59 MG/DL (ref 0.5–1)
EOSINOPHIL # BLD: 0 TH/MM3 (ref 0–0.4)
EOSINOPHIL NFR BLD: 0 % (ref 0–4)
ERYTHROCYTE [DISTWIDTH] IN BLOOD BY AUTOMATED COUNT: 18.5 % (ref 11.6–17.2)
GFR SERPLBLD BASED ON 1.73 SQ M-ARVRAT: 113 ML/MIN (ref 89–?)
GLUCOSE SERPL-MCNC: 119 MG/DL (ref 74–106)
HCO3 BLD-SCNC: 25.6 MEQ/L (ref 21–32)
HCT VFR BLD CALC: 26.3 % (ref 35–46)
HGB BLD-MCNC: 8.6 GM/DL (ref 11.6–15.3)
LYMPHOCYTES # BLD AUTO: 0.4 TH/MM3 (ref 1–4.8)
LYMPHOCYTES NFR BLD AUTO: 2.8 % (ref 9–44)
MCH RBC QN AUTO: 25.7 PG (ref 27–34)
MCHC RBC AUTO-ENTMCNC: 32.8 % (ref 32–36)
MCV RBC AUTO: 78.3 FL (ref 80–100)
MITOCHONDRIA AB SER QL: (no result) U (ref ?–20)
MONOCYTE #: 0.5 TH/MM3 (ref 0–0.9)
MONOCYTES NFR BLD: 3.2 % (ref 0–8)
NEUTROPHILS # BLD AUTO: 13.9 TH/MM3 (ref 1.8–7.7)
NEUTROPHILS NFR BLD AUTO: 93.9 % (ref 16–70)
PLATELET # BLD: 760 TH/MM3 (ref 150–450)
PMV BLD AUTO: 6.6 FL (ref 7–11)
PROT SERPL-MCNC: 6.7 GM/DL (ref 6.4–8.2)
RBC # BLD AUTO: 3.36 MIL/MM3 (ref 4–5.3)
SODIUM SERPL-SCNC: 141 MEQ/L (ref 136–145)
WBC # BLD AUTO: 14.9 TH/MM3 (ref 4–11)

## 2018-03-13 PROCEDURE — 07DR3ZX EXTRACTION OF ILIAC BONE MARROW, PERCUTANEOUS APPROACH, DIAGNOSTIC: ICD-10-PCS

## 2018-03-13 RX ADMIN — MORPHINE SULFATE SCH MG: 30 TABLET, EXTENDED RELEASE ORAL at 10:00

## 2018-03-13 RX ADMIN — ALLOPURINOL SCH MG: 300 TABLET ORAL at 08:46

## 2018-03-13 RX ADMIN — MORPHINE SULFATE SCH MG: 30 TABLET, EXTENDED RELEASE ORAL at 18:25

## 2018-03-13 RX ADMIN — DEXAMETHASONE SODIUM PHOSPHATE SCH MG: 4 INJECTION, SOLUTION INTRAMUSCULAR; INTRAVENOUS at 18:26

## 2018-03-13 RX ADMIN — GABAPENTIN SCH MG: 300 CAPSULE ORAL at 18:26

## 2018-03-13 RX ADMIN — HYDROMORPHONE HYDROCHLORIDE PRN MG: 2 INJECTION INTRAMUSCULAR; INTRAVENOUS; SUBCUTANEOUS at 12:33

## 2018-03-13 RX ADMIN — DEXAMETHASONE SODIUM PHOSPHATE SCH MG: 4 INJECTION, SOLUTION INTRAMUSCULAR; INTRAVENOUS at 12:32

## 2018-03-13 RX ADMIN — PANTOPRAZOLE SODIUM SCH MG: 40 INJECTION, POWDER, FOR SOLUTION INTRAVENOUS at 08:49

## 2018-03-13 RX ADMIN — HYDROMORPHONE HYDROCHLORIDE PRN MG: 2 INJECTION INTRAMUSCULAR; INTRAVENOUS; SUBCUTANEOUS at 16:13

## 2018-03-13 RX ADMIN — HYDROMORPHONE HYDROCHLORIDE PRN MG: 2 INJECTION INTRAMUSCULAR; INTRAVENOUS; SUBCUTANEOUS at 05:25

## 2018-03-13 RX ADMIN — GABAPENTIN SCH MG: 300 CAPSULE ORAL at 08:46

## 2018-03-13 RX ADMIN — GABAPENTIN SCH MG: 300 CAPSULE ORAL at 12:33

## 2018-03-13 RX ADMIN — STANDARDIZED SENNA CONCENTRATE AND DOCUSATE SODIUM SCH TAB: 8.6; 5 TABLET, FILM COATED ORAL at 19:38

## 2018-03-13 RX ADMIN — WATER SCH ML: 1 IRRIGANT IRRIGATION at 19:38

## 2018-03-13 RX ADMIN — Medication SCH ML: at 19:40

## 2018-03-13 RX ADMIN — STANDARDIZED SENNA CONCENTRATE AND DOCUSATE SODIUM SCH TAB: 8.6; 5 TABLET, FILM COATED ORAL at 08:47

## 2018-03-13 RX ADMIN — MORPHINE SULFATE SCH MG: 30 TABLET, EXTENDED RELEASE ORAL at 02:31

## 2018-03-13 RX ADMIN — DEXAMETHASONE SODIUM PHOSPHATE SCH MG: 4 INJECTION, SOLUTION INTRAMUSCULAR; INTRAVENOUS at 05:24

## 2018-03-13 RX ADMIN — HYDROMORPHONE HYDROCHLORIDE PRN MG: 2 INJECTION INTRAMUSCULAR; INTRAVENOUS; SUBCUTANEOUS at 02:31

## 2018-03-13 RX ADMIN — HYDROMORPHONE HYDROCHLORIDE PRN MG: 2 INJECTION INTRAMUSCULAR; INTRAVENOUS; SUBCUTANEOUS at 23:08

## 2018-03-13 RX ADMIN — HYDROMORPHONE HYDROCHLORIDE PRN MG: 2 INJECTION INTRAMUSCULAR; INTRAVENOUS; SUBCUTANEOUS at 08:48

## 2018-03-13 RX ADMIN — Medication SCH ML: at 08:58

## 2018-03-13 RX ADMIN — HYDROMORPHONE HYDROCHLORIDE PRN MG: 2 INJECTION INTRAMUSCULAR; INTRAVENOUS; SUBCUTANEOUS at 19:39

## 2018-03-13 NOTE — RSPPFT
DATE OF PROCEDURE:   3/12/18



COMMENTS:   



VOLUMES

DYNAMIC:    FVC and FEV1 normal.

STATIC:    RV and FRC mildly increased; TLC normal.

FLOWS:    FEV1% normal; FEF 25-75 severely reduced.

DIFFUSION:    Low normal.



IMPRESSION:    

   

Very mild obstructive ventilatory defect in the terminal airways with mild hyperinflation. 
There is improvement post-bronchodilator.

## 2018-03-13 NOTE — HHI.PR
Subjective


Remarks


Pt had BM biopsy today


She had port placed on 3/12


Some pain control issues overnight and this morning and Oramorph increased 


Pt afebrile since 3/11





Objective


Vitals





Vital Signs








  Date Time  Temp Pulse Resp B/P (MAP) Pulse Ox O2 Delivery O2 Flow Rate FiO2


 


3/13/18 17:00  112      


 


3/13/18 15:40  68 18 110/68 (82) 96   


 


3/13/18 15:10  63 18 123/66 (85) 96   


 


3/13/18 14:55 97.3 73 18 119/67 (84) 95   


 


3/13/18 13:00  86      


 


3/13/18 12:15 97.7 89 20 105/60 (75) 97   





    108/70 (83)    


 


3/13/18 12:00  74      


 


3/13/18 11:00  60      


 


3/13/18 10:00  92      


 


3/13/18 09:00  70      


 


3/13/18 08:45 98.1 69 18 107/62 (77) 96   


 


3/13/18 08:45  58      


 


3/13/18 07:00  56      


 


3/13/18 05:20 98.2 68 16 112/63 (79) 98   


 


3/13/18 04:04  100      


 


3/13/18 00:05  70      


 


3/12/18 23:55 97.5 75 16 114/76 (89) 99   


 


3/12/18 21:00 97.8 92 18 110/73 (85) 100   


 


3/12/18 20:27  82      














 3/13/18 3/13/18 3/14/18





 15:00 23:00 07:00


 


Intake Total  480 ml 


 


Output Total  1700 ml 


 


Balance  -1220 ml 


 


   


 


Intake Oral  480 ml 


 


Output Urine Total  1700 ml 


 


# Voids  2 








Result Diagram:  


3/13/18 0600                                                                   

             3/13/18 0600





Other Results





 Laboratory Tests








Test


  3/12/18


05:37 3/13/18


06:00 3/13/18


13:35


 


White Blood Count 17.6 TH/MM3  14.9 TH/MM3  


 


Red Blood Count 3.67 MIL/MM3  3.36 MIL/MM3  


 


Hemoglobin 9.5 GM/DL  8.6 GM/DL  


 


Hematocrit 28.9 %  26.3 %  


 


Mean Corpuscular Volume 78.9 FL  78.3 FL  


 


Mean Corpuscular Hemoglobin 25.8 PG  25.7 PG  


 


Mean Corpuscular Hemoglobin


Concent 32.7 % 


  32.8 % 


  


 


 


Red Cell Distribution Width 18.6 %  18.5 %  


 


Platelet Count 785 TH/MM3  760 TH/MM3  


 


Mean Platelet Volume 6.9 FL  6.6 FL  


 


Neutrophils (%) (Auto) 93.7 %  93.9 %  


 


Lymphocytes (%) (Auto) 2.7 %  2.8 %  


 


Monocytes (%) (Auto) 3.4 %  3.2 %  


 


Eosinophils (%) (Auto) 0.0 %  0.0 %  


 


Basophils (%) (Auto) 0.2 %  0.1 %  


 


Neutrophils # (Auto) 16.5 TH/MM3  13.9 TH/MM3  


 


Lymphocytes # (Auto) 0.5 TH/MM3  0.4 TH/MM3  


 


Monocytes # (Auto) 0.6 TH/MM3  0.5 TH/MM3  


 


Eosinophils # (Auto) 0.0 TH/MM3  0.0 TH/MM3  


 


Basophils # (Auto) 0.0 TH/MM3  0.0 TH/MM3  


 


CBC Comment DIFF FINAL  DIFF FINAL  


 


Differential Comment     


 


Blood Urea Nitrogen 10 MG/DL  16 MG/DL  


 


Creatinine 0.58 MG/DL  0.59 MG/DL  


 


Random Glucose 145 MG/DL  119 MG/DL  


 


Total Protein 7.3 GM/DL  6.7 GM/DL  


 


Albumin 2.2 GM/DL  2.3 GM/DL  


 


Calcium Level 9.0 MG/DL  8.7 MG/DL  


 


Alkaline Phosphatase 218 U/L  185 U/L  


 


Aspartate Amino Transf


(AST/SGOT) 12 U/L 


  15 U/L 


  


 


 


Alanine Aminotransferase


(ALT/SGPT) 18 U/L 


  22 U/L 


  


 


 


Total Bilirubin 0.2 MG/DL  0.2 MG/DL  


 


Sodium Level 142 MEQ/L  141 MEQ/L  


 


Potassium Level 3.8 MEQ/L  4.3 MEQ/L  


 


Chloride Level 107 MEQ/L  105 MEQ/L  


 


Carbon Dioxide Level 26.9 MEQ/L  25.6 MEQ/L  


 


Anion Gap 8 MEQ/L  10 MEQ/L  


 


Estimat Glomerular Filtration


Rate 116 ML/MIN 


  113 ML/MIN 


  


 








Imaging





Last Impressions








Thoracic Spine MRI 3/8/18 0000 Signed





Impressions: 





 Service Date/Time:  Thursday, March 8, 2018 17:55 - CONCLUSION:  Signal 





 abnormalities within the T3 and T4 vertebral body and right T4 pedicle with 





 contrast enhancement.  There is also T1 prolongation without contrast 





 enhancement in the T8 vertebral body.  The appearance is nonspecific but is 





 abnormal and suggests possible metastatic disease.     Roby Matos MD 


 


Sacrum/Coccyx MRI 3/8/18 0000 Signed





Impressions: 





 Service Date/Time:  Thursday, March 8, 2018 17:55 - CONCLUSION:  There are 





 signal abnormalities in the superior S2 and bilateral sacroiliac with T2 





 prolongation and contrast enhancement.  This suggests the possibility of 





 metastatic lesions.     Roby Matos MD 


 


Lumbar Spine MRI 3/8/18 0000 Signed





Impressions: 





 Service Date/Time:  Thursday, March 8, 2018 17:55 - CONCLUSION:  1. No 

evidence 





 of disc bulge or protrusion. 2. Triangular shaped area of T2 prolongation in 

the 





 anterior inferior L1 vertebral body with correlating enhancement on the 





 postcontrast study.  This of uncertain significance, but would be an unusual 





 shape and location for a metastatic lesion.     Roby Matos MD 


 


Abdomen X-Ray 3/8/18 0000 Signed





Impressions: 





 Service Date/Time:  Thursday, March 8, 2018 15:26 - CONCLUSION:  Moderate 

stool 





 throughout the colon. Otherwise, benign-appearing abdomen.     Enrico Russell MD 


 


Hip and Pelvis X-Ray 3/7/18 1854 Signed





Impressions: 





 Service Date/Time:  Wednesday, March 7, 2018 19:11 - CONCLUSION:  Negative 





 evaluation of the left hip.     Roby Matos MD 











Outpt Chest CT W/W/O IV contrast (3/2/18):


- Innumerable masses scattered throughout the lungs. Numerous lymph nodes 

throughout the mediastinum and both hilar regions





Outpt CT Abd/pelvis W/W/O contrast (3/2/18):


- There is widespread metastatic disease throughout the spleen, the liver and 

the retroperitoneum. Lymphoma most likely diagnosis versus conceivably melanoma 

or breast cancer. 


- Indeterminate sclerotic lesion in T12. 


- Liver with 2.7cm low-density lesion in the lateral segment of the left lobe 

of the liver. At least 5 other low-density lesions scattered throughout the 

liver concerning for metastatic disease. 


- Spleen is markedly heterogenous appearance with multiple masses. 


- Retroperitoneum with 2.2cm left periaortic lymph node below the left renal 

vein. At least 3 other retroperitoneal lymph nodes are present. 


- 3.0 x 1.6cm right inguinal lymph node. Some internal obturator 

lymphadenopathy on the right as well.


Objective Remarks


General: NAD, AAOx3


Chest: CTA


Cardiac: Regular


Abd: +BS, soft ND/NT


Ext: No edema





A/P


Problem List:  


(1) Hodgkin lymphoma


ICD Codes:  C81.90 - Hodgkin lymphoma, unspecified, unspecified site


Status:  Acute


Plan:  - Pt had reported unintentional weight loss ~20lbs since 1/2018, night 

sweats and had outpt CT Chest/Abd/Pelvis on 3/2/18


- Outpt Chest CT W/W/O IV contrast (3/2/18):


   - Innumerable masses scattered throughout the lungs. Numerous lymph nodes 

throughout the mediastinum and both hilar regions





- Outpt CT Abd/pelvis W/W/O contrast (3/2/18):


   - There is widespread metastatic disease throughout the spleen, the liver 

and the retroperitoneum. Lymphoma most likely diagnosis


    versus conceivably melanoma or breast cancer. 


   - Indeterminate sclerotic lesion in T12. 


   - Liver with 2.7cm low-density lesion in the lateral segment of the left 

lobe of the liver. At least 5 other low-density lesions scattered


    throughout the liver concerning for metastatic disease. 


   - Spleen is markedly heterogenous appearance with multiple masses. 


   - Retroperitoneum with 2.2cm left periaortic lymph node below the left renal 

vein. At least 3 other retroperitoneal lymph nodes are present. 


   - 3.0 x 1.6cm right inguinal lymph node. Some internal obturator 

lymphadenopathy on the right as well. 





- Pt underwent outpt CT guided liver biopsy and US guided LN biopsy on 3/7/18.


- AFP, CA-19-9, CEA, Ca 125 are WNL


- TSH, Free T4 are WNL





- Pt has been having increased pain in her tailbone and back more recently. 


- Thoracic Spine MRI (3/8/18) --> Signal abnormalities within the T3 and T4 

vertebral body and right T4 pedicle with contrast enhancement.  There is 


 also T1 prolongation without contrast enhancement in the T8 vertebral body.  

The appearance is nonspecific but is abnormal and suggests possible


 metastatic disease. 


- Lumbar Spine MRI (3/8/18) --> No evidence of disc bulge or protrusion. 

Triangular shaped area of T2 prolongation in the anterior inferior L1 vertebral 


 body with correlating enhancement on the postcontrast study.  This of 

uncertain significance, but would be an unusual shape and location for a


 metastatic lesion.  


- Sacral spine MRI (3/8/18) --> There are signal abnormalities in the superior 

S2 and bilateral sacroiliac with T2 prolongation and contrast enhancement.


  This suggests the possibility of metastatic lesions.





- Pathology from lymph nodes shows classic Hodgkin lymphoma. liver bx 

nondiagnostic





- Consulted Dr Kennedy. He has discussed with pt and family and chemotherapy 

planned immediately. 


- Cont pain control and titrate as needed. Pt is on MS Contin 30mg Q8H, Norco 5/

325 Q4H PRN, Dilaudid IV 2mg Q3H PRN. 


 Oramorph increased to 60mg PO TID on 3/13 and Ativan 0.5mg IV PRN added for 

anxiety on 3/13


- Decadron 4mg IV Q6H started on 3/11


- Trial of Neurontin 300mg TID started on 3/11


- 2D echo (3/10/18):


   - LV systolic function hyperdynamic with estimated EF 65-70%


   - Trace to mild tricuspid valve regurgitation


   - Estimated PA pressure 37.2mmHg


- Pt had PFT with DLCO on 3/12


- [id number] placement on 3/12


- BM biopsy on 3/13, pathology pending. 


- Pt to start chemo induction tomorrow





(2) Symptomatic anemia


ICD Codes:  D64.9 - Anemia, unspecified


Status:  Acute


Plan:  


- Pt is a 39 y/o female who was sent to the ED by her PCP for worsening anemia 

and leukocytosis on outpt labs. 


- Pt reported various complaints at the time of admission, including weight 

loss (Approx 20lbs in the last 3 months), intermittent dizziness, 


 fevers, "bone pain", night sweats. 


- Pts outpt H/H has decreased to Hgb 7.4/Hct 22.8, MCV 77, MCH 25 on 3/6/18 and 

was recommended to report to the ED for further eval. 


- Pt had previously reported intermittent black stools and was noted to be 

Hemoccult positive in the ED. 


- She was seen by GI in 2/2017 and had an EGD with dilation for complaints of 

dysphagia and odynophagia. The EGD noted gastritis/superficial 


 ulceration of the antrum, esophagitis in the distal esophagus, and food bolus 

in the stomach. 


- Pt was planned for outpt evaluation with colonoscopy which had not been 

performed yet. 


- Pt has family hx of colon cancer, father. 


- She has received 2 units of PRBCs at admission with H/H improving and stable


- GI following


- Pt had EGD on 3/9/18 which was negative. 


- Pt refused the colonoscopy 


- Monitor H/H 


- PPI


- Supportive care





(3) Fever


ICD Codes:  R50.9 - Fever, unspecified


Status:  Acute


Plan:  


- Pt has had reported night sweats and was noted to have fevers prior to 

admission


- Blood cultures taken in the ED with NGTD


- UA was negative


- Felt to most likely be related to tumor fever or related to malignancy, no 

obvious signs of infection


- Tylenol PRN





(4) Weight loss, unintentional


ICD Codes:  R63.4 - Abnormal weight loss


Status:  Acute


Plan:  


- See above.





(5) Back pain


ICD Codes:  M54.9 - Dorsalgia, unspecified


Status:  Acute


Plan:  


- Pt has been having increased pain in her tailbone and back more recently 

likely related to bone mets. 


- See above





(6) Liver mass


ICD Codes:  R16.0 - Hepatomegaly, not elsewhere classified


Plan:  


- Outpt biopsy was nondiagnostic





Assessment and Plan


Patient examined.


Assessment and plan formulated with Renée Dykes PA-C.


I agree with the above.





Case d/w Dr. Hemphill (3/13). 


Pt to start chemotherapy 3/14. 


Pt was in good spirits.











Renée Dykes Mar 13, 2018 18:33


Travon Cuadra DO Mar 14, 2018 11:30

## 2018-03-13 NOTE — RADRPT
EXAM DATE/TIME:  03/13/2018 14:08 

 

[facility] COMPARISON:     

No previous studies available for comparison.

 

 

INDICATIONS :      

Lymphoma

                     

 

 

 

SEDATION TIME:       

25 minutes

 

 

BIOPSY SITE:       

Right ilium

                     

 

MEDICATION(S):

1.) 4 mg midazolam (Versed) IV  

2.) 200 mcg fentanyl (Sublimaze) IV  

 

 

DEVICE(S):

1.) 12 gauge On-Control needle 

                     

 

MEDICAL HISTORY :     

None.   

 

SURGICAL HISTORY :     

None.   

 

ENCOUNTER:     

Initial

 

ACUITY:     

1 day

 

PAIN SCORE:     

8/10

 

LOCATION:      

back

                     

A total of one core specimen(s) were obtained and sent to the laboratory for pathologic evaluation.

 

 

PROCEDURE:

1.   CT guided bone marrow biopsy.

 

 

Prior to the procedure informed consent was obtained.  Any appropriate prior imaging studies were rev
iewed.  Using automated exposure control and adjustment of the mA and/or kV according to patient size
, radiation dose was kept as low as reasonably achievable to obtain optimal diagnostic quality images
.  DICOM format image data is available electronically for review and comparison.  

 

The site was prepped in a sterile fashion.  Full sterile technique was used, including cap, mask, kaykay
rile gloves and gown and a large sterile sheet.  Hand hygiene and 2% chlorhexidine and/or betadine/al
cohol prep was utilized per protocol for cutaneous antisepsis.  The skin and subcutaneous tissues wer
e infiltrated with local anesthetic solution.

 

With CT guidance the previously identified target was localized. Biopsy was performed using the presc
ribed needle as above.  Following biopsy marrow aspiration was performed with repeat puncture. Adequa
te hemostasis was obtained with compression at the puncture site.

 

Follow-up CT scan reveals no hemorrhage.

 

Conscious sedation was performed with the prescribed dosages and duration as above in the presence of
 an independent trained radiology nurse to assist in the monitoring of the patient.  EKG and oximetry
 remained stable throughout the procedure. The patient tolerated the procedure well and there were no
 complications.  The patient was sent to Radiology Outpatient Unit in stable condition.

 

CONCLUSION:     

1.  Uncomplicated CT guided bone marrow aspirate.

2.  Uncomplicated CT guided bone marrow biopsy.

 

 

 

 Denis Solorio MD on March 13, 2018 at 15:28           

Board Certified Radiologist.

 This report was verified electronically.

## 2018-03-14 VITALS
RESPIRATION RATE: 16 BRPM | HEART RATE: 56 BPM | SYSTOLIC BLOOD PRESSURE: 120 MMHG | OXYGEN SATURATION: 97 % | DIASTOLIC BLOOD PRESSURE: 59 MMHG | TEMPERATURE: 97.8 F

## 2018-03-14 VITALS
OXYGEN SATURATION: 99 % | SYSTOLIC BLOOD PRESSURE: 105 MMHG | TEMPERATURE: 97.5 F | DIASTOLIC BLOOD PRESSURE: 68 MMHG | RESPIRATION RATE: 18 BRPM | HEART RATE: 65 BPM

## 2018-03-14 VITALS
RESPIRATION RATE: 18 BRPM | HEART RATE: 84 BPM | OXYGEN SATURATION: 99 % | SYSTOLIC BLOOD PRESSURE: 118 MMHG | DIASTOLIC BLOOD PRESSURE: 64 MMHG | TEMPERATURE: 97.4 F

## 2018-03-14 VITALS
TEMPERATURE: 97.2 F | HEART RATE: 67 BPM | SYSTOLIC BLOOD PRESSURE: 125 MMHG | RESPIRATION RATE: 16 BRPM | OXYGEN SATURATION: 97 % | DIASTOLIC BLOOD PRESSURE: 71 MMHG

## 2018-03-14 VITALS
SYSTOLIC BLOOD PRESSURE: 109 MMHG | TEMPERATURE: 97.8 F | RESPIRATION RATE: 18 BRPM | OXYGEN SATURATION: 98 % | HEART RATE: 82 BPM | DIASTOLIC BLOOD PRESSURE: 69 MMHG

## 2018-03-14 VITALS
SYSTOLIC BLOOD PRESSURE: 118 MMHG | RESPIRATION RATE: 16 BRPM | TEMPERATURE: 98.3 F | HEART RATE: 70 BPM | DIASTOLIC BLOOD PRESSURE: 71 MMHG | OXYGEN SATURATION: 97 %

## 2018-03-14 VITALS — HEART RATE: 60 BPM

## 2018-03-14 VITALS — HEART RATE: 84 BPM

## 2018-03-14 VITALS — HEART RATE: 78 BPM

## 2018-03-14 VITALS — HEART RATE: 58 BPM

## 2018-03-14 VITALS — HEART RATE: 116 BPM

## 2018-03-14 VITALS — HEART RATE: 54 BPM

## 2018-03-14 VITALS — OXYGEN SATURATION: 98 % | HEART RATE: 61 BPM

## 2018-03-14 VITALS
OXYGEN SATURATION: 97 % | DIASTOLIC BLOOD PRESSURE: 69 MMHG | RESPIRATION RATE: 16 BRPM | HEART RATE: 86 BPM | SYSTOLIC BLOOD PRESSURE: 112 MMHG | TEMPERATURE: 97.7 F

## 2018-03-14 VITALS — HEART RATE: 53 BPM

## 2018-03-14 VITALS — HEART RATE: 62 BPM

## 2018-03-14 VITALS — HEART RATE: 66 BPM

## 2018-03-14 VITALS — HEART RATE: 88 BPM

## 2018-03-14 VITALS — HEART RATE: 56 BPM

## 2018-03-14 VITALS — HEART RATE: 76 BPM

## 2018-03-14 VITALS — HEART RATE: 74 BPM

## 2018-03-14 VITALS — HEART RATE: 64 BPM

## 2018-03-14 VITALS — HEART RATE: 92 BPM

## 2018-03-14 LAB
BASOPHILS # BLD AUTO: 0 TH/MM3 (ref 0–0.2)
BASOPHILS NFR BLD: 0.1 % (ref 0–2)
BUN SERPL-MCNC: 14 MG/DL (ref 7–18)
CALCIUM SERPL-MCNC: 9.1 MG/DL (ref 8.5–10.1)
CHLORIDE SERPL-SCNC: 104 MEQ/L (ref 98–107)
CREAT SERPL-MCNC: 0.64 MG/DL (ref 0.5–1)
EOSINOPHIL # BLD: 0 TH/MM3 (ref 0–0.4)
EOSINOPHIL NFR BLD: 0 % (ref 0–4)
ERYTHROCYTE [DISTWIDTH] IN BLOOD BY AUTOMATED COUNT: 19 % (ref 11.6–17.2)
GFR SERPLBLD BASED ON 1.73 SQ M-ARVRAT: 103 ML/MIN (ref 89–?)
GLUCOSE SERPL-MCNC: 183 MG/DL (ref 74–106)
HCO3 BLD-SCNC: 25.5 MEQ/L (ref 21–32)
HCT VFR BLD CALC: 30.2 % (ref 35–46)
HGB BLD-MCNC: 9.7 GM/DL (ref 11.6–15.3)
LYMPHOCYTES # BLD AUTO: 0.3 TH/MM3 (ref 1–4.8)
LYMPHOCYTES NFR BLD AUTO: 1.9 % (ref 9–44)
MCH RBC QN AUTO: 25.3 PG (ref 27–34)
MCHC RBC AUTO-ENTMCNC: 32.1 % (ref 32–36)
MCV RBC AUTO: 78.9 FL (ref 80–100)
MONOCYTE #: 0.6 TH/MM3 (ref 0–0.9)
MONOCYTES NFR BLD: 3.3 % (ref 0–8)
NEUTROPHILS # BLD AUTO: 15.9 TH/MM3 (ref 1.8–7.7)
NEUTROPHILS NFR BLD AUTO: 94.7 % (ref 16–70)
PLATELET # BLD: 925 TH/MM3 (ref 150–450)
PMV BLD AUTO: 6.8 FL (ref 7–11)
RBC # BLD AUTO: 3.82 MIL/MM3 (ref 4–5.3)
SODIUM SERPL-SCNC: 139 MEQ/L (ref 136–145)
WBC # BLD AUTO: 16.8 TH/MM3 (ref 4–11)

## 2018-03-14 PROCEDURE — 3E03305 INTRODUCTION OF OTHER ANTINEOPLASTIC INTO PERIPHERAL VEIN, PERCUTANEOUS APPROACH: ICD-10-PCS | Performed by: INTERNAL MEDICINE

## 2018-03-14 RX ADMIN — GABAPENTIN SCH MG: 300 CAPSULE ORAL at 19:41

## 2018-03-14 RX ADMIN — WATER SCH ML: 1 IRRIGANT IRRIGATION at 08:17

## 2018-03-14 RX ADMIN — STANDARDIZED SENNA CONCENTRATE AND DOCUSATE SODIUM SCH TAB: 8.6; 5 TABLET, FILM COATED ORAL at 20:47

## 2018-03-14 RX ADMIN — HYDROMORPHONE HYDROCHLORIDE PRN MG: 2 INJECTION INTRAMUSCULAR; INTRAVENOUS; SUBCUTANEOUS at 08:16

## 2018-03-14 RX ADMIN — MORPHINE SULFATE SCH MG: 30 TABLET, EXTENDED RELEASE ORAL at 19:41

## 2018-03-14 RX ADMIN — MORPHINE SULFATE SCH MG: 30 TABLET, EXTENDED RELEASE ORAL at 08:17

## 2018-03-14 RX ADMIN — MAGNESIUM HYDROXIDE PRN ML: 400 SUSPENSION ORAL at 05:38

## 2018-03-14 RX ADMIN — DEXAMETHASONE SODIUM PHOSPHATE SCH MG: 4 INJECTION, SOLUTION INTRAMUSCULAR; INTRAVENOUS at 19:42

## 2018-03-14 RX ADMIN — HYDROMORPHONE HYDROCHLORIDE PRN MG: 2 INJECTION INTRAMUSCULAR; INTRAVENOUS; SUBCUTANEOUS at 20:49

## 2018-03-14 RX ADMIN — GABAPENTIN SCH MG: 300 CAPSULE ORAL at 08:17

## 2018-03-14 RX ADMIN — HYDROMORPHONE HYDROCHLORIDE PRN MG: 2 INJECTION INTRAMUSCULAR; INTRAVENOUS; SUBCUTANEOUS at 02:25

## 2018-03-14 RX ADMIN — DEXAMETHASONE SODIUM PHOSPHATE SCH MG: 4 INJECTION, SOLUTION INTRAMUSCULAR; INTRAVENOUS at 05:33

## 2018-03-14 RX ADMIN — HYDROMORPHONE HYDROCHLORIDE PRN MG: 2 INJECTION INTRAMUSCULAR; INTRAVENOUS; SUBCUTANEOUS at 05:33

## 2018-03-14 RX ADMIN — GABAPENTIN SCH MG: 300 CAPSULE ORAL at 12:44

## 2018-03-14 RX ADMIN — DEXAMETHASONE SODIUM PHOSPHATE SCH MG: 4 INJECTION, SOLUTION INTRAMUSCULAR; INTRAVENOUS at 00:02

## 2018-03-14 RX ADMIN — HYDROMORPHONE HYDROCHLORIDE PRN MG: 2 INJECTION INTRAMUSCULAR; INTRAVENOUS; SUBCUTANEOUS at 17:52

## 2018-03-14 RX ADMIN — HYDROMORPHONE HYDROCHLORIDE PRN MG: 2 INJECTION INTRAMUSCULAR; INTRAVENOUS; SUBCUTANEOUS at 11:28

## 2018-03-14 RX ADMIN — PANTOPRAZOLE SODIUM SCH MG: 40 INJECTION, POWDER, FOR SOLUTION INTRAVENOUS at 08:15

## 2018-03-14 RX ADMIN — DEXAMETHASONE SODIUM PHOSPHATE SCH MG: 4 INJECTION, SOLUTION INTRAMUSCULAR; INTRAVENOUS at 23:44

## 2018-03-14 RX ADMIN — STANDARDIZED SENNA CONCENTRATE AND DOCUSATE SODIUM SCH TAB: 8.6; 5 TABLET, FILM COATED ORAL at 08:17

## 2018-03-14 RX ADMIN — HYDROMORPHONE HYDROCHLORIDE PRN MG: 2 INJECTION INTRAMUSCULAR; INTRAVENOUS; SUBCUTANEOUS at 14:28

## 2018-03-14 RX ADMIN — Medication SCH ML: at 08:17

## 2018-03-14 RX ADMIN — DEXAMETHASONE SODIUM PHOSPHATE SCH MG: 4 INJECTION, SOLUTION INTRAMUSCULAR; INTRAVENOUS at 12:00

## 2018-03-14 RX ADMIN — Medication SCH ML: at 20:49

## 2018-03-14 RX ADMIN — MORPHINE SULFATE SCH MG: 30 TABLET, EXTENDED RELEASE ORAL at 02:24

## 2018-03-14 RX ADMIN — HYDROMORPHONE HYDROCHLORIDE PRN MG: 2 INJECTION INTRAMUSCULAR; INTRAVENOUS; SUBCUTANEOUS at 23:44

## 2018-03-14 RX ADMIN — ALLOPURINOL SCH MG: 300 TABLET ORAL at 08:17

## 2018-03-14 NOTE — HHI.PR
Subjective


Remarks


Pt anxious this AM. 


Pt also painful at port site and LBP.





Objective


Vitals





Vital Signs








  Date Time  Temp Pulse Resp B/P (MAP) Pulse Ox O2 Delivery O2 Flow Rate FiO2


 


3/14/18 08:00 97.5 65 18 105/68 (80) 99   


 


3/14/18 05:00  54      


 


3/14/18 04:35 98.3 70 16 118/71 (87) 97   


 


3/14/18 04:05  78      


 


3/14/18 03:00  88      


 


3/14/18 02:00  60      


 


3/14/18 01:00  58      


 


3/14/18 00:11  64      


 


3/14/18 00:02 97.7 86 16 112/69 (83) 97   


 


3/13/18 23:00  92      


 


3/13/18 22:00  66      


 


3/13/18 21:00  64      


 


3/13/18 20:42 98.3 66 16 103/66 (78) 97   


 


3/13/18 20:12  78      


 


3/13/18 19:00  70      


 


3/13/18 17:00  112      


 


3/13/18 15:40  68 18 110/68 (82) 96   


 


3/13/18 15:10  63 18 123/66 (85) 96   


 


3/13/18 14:55 97.3 73 18 119/67 (84) 95   


 


3/13/18 13:00  86      


 


3/13/18 12:15 97.7 89 20 105/60 (75) 97   





    108/70 (83)    


 


3/13/18 12:00  74      








Result Diagram:  


3/14/18 0430                                                                   

             3/14/18 0430





Imaging





Last Impressions








Thoracic Spine MRI 3/8/18 0000 Signed





Impressions: 





 Service Date/Time:  Thursday, March 8, 2018 17:55 - CONCLUSION:  Signal 





 abnormalities within the T3 and T4 vertebral body and right T4 pedicle with 





 contrast enhancement.  There is also T1 prolongation without contrast 





 enhancement in the T8 vertebral body.  The appearance is nonspecific but is 





 abnormal and suggests possible metastatic disease.     Roby Matos MD 


 


Sacrum/Coccyx MRI 3/8/18 0000 Signed





Impressions: 





 Service Date/Time:  Thursday, March 8, 2018 17:55 - CONCLUSION:  There are 





 signal abnormalities in the superior S2 and bilateral sacroiliac with T2 





 prolongation and contrast enhancement.  This suggests the possibility of 





 metastatic lesions.     Roby Matos MD 


 


Lumbar Spine MRI 3/8/18 0000 Signed





Impressions: 





 Service Date/Time:  Thursday, March 8, 2018 17:55 - CONCLUSION:  1. No 

evidence 





 of disc bulge or protrusion. 2. Triangular shaped area of T2 prolongation in 

the 





 anterior inferior L1 vertebral body with correlating enhancement on the 





 postcontrast study.  This of uncertain significance, but would be an unusual 





 shape and location for a metastatic lesion.     Roby Matos MD 


 


Abdomen X-Ray 3/8/18 0000 Signed





Impressions: 





 Service Date/Time:  Thursday, March 8, 2018 15:26 - CONCLUSION:  Moderate 

stool 





 throughout the colon. Otherwise, benign-appearing abdomen.     Enrico Russell MD 


 


Hip and Pelvis X-Ray 3/7/18 1854 Signed





Impressions: 





 Service Date/Time:  Wednesday, March 7, 2018 19:11 - CONCLUSION:  Negative 





 evaluation of the left hip.     Roby Matos MD 











Outpt Chest CT W/W/O IV contrast (3/2/18):


- Innumerable masses scattered throughout the lungs. Numerous lymph nodes 

throughout the mediastinum and both hilar regions





Outpt CT Abd/pelvis W/W/O contrast (3/2/18):


- There is widespread metastatic disease throughout the spleen, the liver and 

the retroperitoneum. Lymphoma most likely diagnosis versus conceivably melanoma 

or breast cancer. 


- Indeterminate sclerotic lesion in T12. 


- Liver with 2.7cm low-density lesion in the lateral segment of the left lobe 

of the liver. At least 5 other low-density lesions scattered throughout the 

liver concerning for metastatic disease. 


- Spleen is markedly heterogenous appearance with multiple masses. 


- Retroperitoneum with 2.2cm left periaortic lymph node below the left renal 

vein. At least 3 other retroperitoneal lymph nodes are present. 


- 3.0 x 1.6cm right inguinal lymph node. Some internal obturator 

lymphadenopathy on the right as well.


Objective Remarks


General: NAD, AAOx3


Chest: CTA


Cardiac: Regular


Abd: +BS, soft ND/NT


Ext: No edema





A/P


Problem List:  


(1) Hodgkin lymphoma


ICD Codes:  C81.90 - Hodgkin lymphoma, unspecified, unspecified site


Status:  Acute


Plan:  - Pt had reported unintentional weight loss ~20lbs since 1/2018, night 

sweats and had outpt CT Chest/Abd/Pelvis on 3/2/18


- Outpt Chest CT W/W/O IV contrast (3/2/18):


   - Innumerable masses scattered throughout the lungs. Numerous lymph nodes 

throughout the mediastinum and both hilar regions





- Outpt CT Abd/pelvis W/W/O contrast (3/2/18):


   - There is widespread metastatic disease throughout the spleen, the liver 

and the retroperitoneum. Lymphoma most likely diagnosis


    versus conceivably melanoma or breast cancer. 


   - Indeterminate sclerotic lesion in T12. 


   - Liver with 2.7cm low-density lesion in the lateral segment of the left 

lobe of the liver. At least 5 other low-density lesions scattered


    throughout the liver concerning for metastatic disease. 


   - Spleen is markedly heterogenous appearance with multiple masses. 


   - Retroperitoneum with 2.2cm left periaortic lymph node below the left renal 

vein. At least 3 other retroperitoneal lymph nodes are present. 


   - 3.0 x 1.6cm right inguinal lymph node. Some internal obturator 

lymphadenopathy on the right as well. 





- Pt underwent outpt CT guided liver biopsy and US guided LN biopsy on 3/7/18.


- AFP, CA-19-9, CEA, Ca 125 are WNL


- TSH, Free T4 are WNL





- Pt has been having increased pain in her tailbone and back more recently. 


- Thoracic Spine MRI (3/8/18) --> Signal abnormalities within the T3 and T4 

vertebral body and right T4 pedicle with contrast enhancement.  There is 


 also T1 prolongation without contrast enhancement in the T8 vertebral body.  

The appearance is nonspecific but is abnormal and suggests possible


 metastatic disease. 


- Lumbar Spine MRI (3/8/18) --> No evidence of disc bulge or protrusion. 

Triangular shaped area of T2 prolongation in the anterior inferior L1 vertebral 


 body with correlating enhancement on the postcontrast study.  This of 

uncertain significance, but would be an unusual shape and location for a


 metastatic lesion.  


- Sacral spine MRI (3/8/18) --> There are signal abnormalities in the superior 

S2 and bilateral sacroiliac with T2 prolongation and contrast enhancement.


  This suggests the possibility of metastatic lesions.





- Pathology from lymph nodes shows classic Hodgkin lymphoma. liver bx 

nondiagnostic





- Consulted Dr Kennedy. He has discussed with pt and family and chemotherapy 

planned immediately. 


- Cont pain control and titrate as needed. Pt is on MS Contin 30mg Q8H, Norco 5/

325 Q4H PRN, Dilaudid IV 2mg Q3H PRN. 


 Oramorph increased to 60mg PO TID on 3/13 and Ativan 0.5mg IV PRN added for 

anxiety on 3/13


- Decadron 4mg IV Q6H started on 3/11


- Trial of Neurontin 300mg TID started on 3/11


- 2D echo (3/10/18):


   - LV systolic function hyperdynamic with estimated EF 65-70%


   - Trace to mild tricuspid valve regurgitation


   - Estimated PA pressure 37.2mmHg


- Pt had PFT with DLCO on 3/12


- Owirlp-i-Pssi placement on 3/12


- BM biopsy on 3/13, pathology pending. 


- Pt to start chemotherapy today. 


- If pain is controlled, then anticipate d/c to home 3/14/18





(2) Symptomatic anemia


ICD Codes:  D64.9 - Anemia, unspecified


Status:  Acute


Plan:  


- Pt is a 39 y/o female who was sent to the ED by her PCP for worsening anemia 

and leukocytosis on outpt labs. 


- Pt reported various complaints at the time of admission, including weight 

loss (Approx 20lbs in the last 3 months), intermittent dizziness, 


 fevers, "bone pain", night sweats. 


- Pts outpt H/H has decreased to Hgb 7.4/Hct 22.8, MCV 77, MCH 25 on 3/6/18 and 

was recommended to report to the ED for further eval. 


- Pt had previously reported intermittent black stools and was noted to be 

Hemoccult positive in the ED. 


- She was seen by GI in 2/2017 and had an EGD with dilation for complaints of 

dysphagia and odynophagia. The EGD noted gastritis/superficial 


 ulceration of the antrum, esophagitis in the distal esophagus, and food bolus 

in the stomach. 


- Pt was planned for outpt evaluation with colonoscopy which had not been 

performed yet. 


- Pt has family hx of colon cancer, father. 


- She has received 2 units of PRBCs at admission with H/H improving and stable


- GI following


- Pt had EGD on 3/9/18 which was negative. 


- Pt refused the colonoscopy 


- Monitor H/H 


- PPI


- Supportive care





(3) Fever


ICD Codes:  R50.9 - Fever, unspecified


Status:  Acute


Plan:  


- Pt has had reported night sweats and was noted to have fevers prior to 

admission


- Blood cultures taken in the ED with NGTD


- UA was negative


- Felt to most likely be related to tumor fever or related to malignancy, no 

obvious signs of infection


- Tylenol PRN





(4) Weight loss, unintentional


ICD Codes:  R63.4 - Abnormal weight loss


Status:  Acute


Plan:  


- See above.





(5) Back pain


ICD Codes:  M54.9 - Dorsalgia, unspecified


Status:  Acute


Plan:  


- Pt has been having increased pain in her tailbone and back more recently 

likely related to bone mets. 


- See above





(6) Liver mass


ICD Codes:  R16.0 - Hepatomegaly, not elsewhere classified


Plan:  


- Outpt biopsy was nondiagnostic














Travon Cuadra DO Mar 14, 2018 11:32

## 2018-03-14 NOTE — PD.ONC.PN
Subjective


Subjective


Remarks


Anxious about chemo but ready to start.


Wants to try CBD oil- advised not on the day of chemo.


STill has itching.





Objective


Data











  Date Time  Temp Pulse Resp B/P (MAP) Pulse Ox O2 Delivery O2 Flow Rate FiO2


 


3/14/18 08:46   18     


 


3/14/18 08:00 97.5 65 18 105/68 (80) 99   


 


3/14/18 05:00  54      


 


3/14/18 04:35 98.3 70 16 118/71 (87) 97   


 


3/14/18 04:05  78      


 


3/14/18 03:00  88      


 


3/14/18 02:00  60      


 


3/14/18 01:00  58      


 


3/14/18 00:11  64      


 


3/14/18 00:02 97.7 86 16 112/69 (83) 97   


 


3/13/18 23:00  92      


 


3/13/18 22:00  66      


 


3/13/18 21:00  64      


 


3/13/18 20:42 98.3 66 16 103/66 (78) 97   


 


3/13/18 20:12  78      


 


3/13/18 19:00  70      


 


3/13/18 17:00  112      


 


3/13/18 15:40  68 18 110/68 (82) 96   


 


3/13/18 15:10  63 18 123/66 (85) 96   


 


3/13/18 14:55 97.3 73 18 119/67 (84) 95   


 


3/13/18 13:00  86      














 3/14/18 3/14/18 3/14/18





 07:00 15:00 23:00


 


Output Total 1200 ml  


 


Balance -1200 ml  








Result Diagram:  


3/14/18 0430                                                                   

             3/14/18 0430





Laboratory Results





Laboratory Tests








Test


  3/13/18


13:35 3/14/18


04:30


 


White Blood Count  16.8 TH/MM3 


 


Red Blood Count  3.82 MIL/MM3 


 


Hemoglobin  9.7 GM/DL 


 


Hematocrit  30.2 % 


 


Mean Corpuscular Volume  78.9 FL 


 


Mean Corpuscular Hemoglobin  25.3 PG 


 


Mean Corpuscular Hemoglobin


Concent 


  32.1 % 


 


 


Red Cell Distribution Width  19.0 % 


 


Platelet Count  925 TH/MM3 


 


Mean Platelet Volume  6.8 FL 


 


Neutrophils (%) (Auto)  94.7 % 


 


Lymphocytes (%) (Auto)  1.9 % 


 


Monocytes (%) (Auto)  3.3 % 


 


Eosinophils (%) (Auto)  0.0 % 


 


Basophils (%) (Auto)  0.1 % 


 


Neutrophils # (Auto)  15.9 TH/MM3 


 


Lymphocytes # (Auto)  0.3 TH/MM3 


 


Monocytes # (Auto)  0.6 TH/MM3 


 


Eosinophils # (Auto)  0.0 TH/MM3 


 


Basophils # (Auto)  0.0 TH/MM3 


 


CBC Comment  AUTO DIFF 


 


Differential Comment


  


  AUTO DIFF


CONFIRMED


 


Blood Urea Nitrogen  14 MG/DL 


 


Creatinine  0.64 MG/DL 


 


Random Glucose  183 MG/DL 


 


Calcium Level  9.1 MG/DL 


 


Uric Acid  2.1 MG/DL 


 


Lactate Dehydrogenase  140 U/L 


 


Sodium Level  139 MEQ/L 


 


Potassium Level  4.0 MEQ/L 


 


Chloride Level  104 MEQ/L 


 


Carbon Dioxide Level  25.5 MEQ/L 


 


Anion Gap  10 MEQ/L 


 


Estimat Glomerular Filtration


Rate 


  103 ML/MIN 


 








Imaging Studies





Last 24 hours Impressions








Abdomen X-Ray 3/14/18 0800 Signed





Impressions: 





 Service Date/Time:  Wednesday, March 14, 2018 09:22 - CONCLUSION:  1. Mild 





 constipation. No acute findings.     Tarun Root MD 











Administered Medications








 Medications


  (Trade)  Dose


 Ordered  Sig/Nohemi


 Route


 PRN Reason  Start Time


 Stop Time Status Last Admin


Dose Admin


 


 Sodium Chloride


  (NS Flush)  2 ml  BID


 IV FLUSH


   3/8/18 09:00


    3/14/18 08:17


 


 


 Acetaminophen


  (Tylenol)  650 mg  Q4H  PRN


 PO


 TEMP > 100.4  3/7/18 21:30


    3/10/18 23:25


 


 


 Senna/Docusate


 Sodium


  (Thelma-Colace)  1 tab  BID


 PO


   3/8/18 09:00


    3/14/18 08:17


 


 


 Magnesium


 Hydroxide


  (Milk Of


 Magnesia Liq)  30 ml  Q12H  PRN


 PO


 Mild constipation  3/7/18 21:30


    3/14/18 05:38


 


 


 Lactulose


  (Lactulose Liq)  30 ml  DAILY  PRN


 PO


 SEVERE CONSITIPATION  3/7/18 21:30


    3/8/18 16:22


 


 


 Acetaminophen/


 Hydrocodone Bitart


  (Norco  5-325 Mg)  1 tab  Q4H  PRN


 PO


 pain 2-5  3/8/18 15:15


    3/9/18 19:50


 


 


 Allopurinol


  (Zyloprim)  300 mg  DAILY


 PO


   3/11/18 09:00


    3/14/18 08:17


 


 


 Dexamethasone


 Sodium Phosphate


  (Decadron Inj)  4 mg  Q6HR


 IV PUSH


   3/11/18 00:00


    3/14/18 05:33


 


 


 Gabapentin


  (Neurontin)  300 mg  TID


 PO


   3/11/18 09:00


    3/14/18 12:44


 


 


 Hydroxyzine HCl


  (Atarax)  25 mg  Q6H


 PO


   3/11/18 09:00


    3/14/18 08:16


 


 


 Hydroxyzine HCl


  (Atarax)  25 mg  Q6H  PRN


 PO


 breakthrough itch  3/11/18 09:00


    3/14/18 12:44


 


 


 Hydromorphone HCl


  (Dilaudid Pf Inj)  2 mg  Q3H  PRN


 IV PUSH


 pain 6-10  3/11/18 17:45


    3/14/18 11:28


 


 


 Vancomycin HCl


 1000 mg/Sodium


 Chloride  250 ml @ 


 250 mls/hr  ON  CALL


 IV


   3/12/18 08:15


 3/16/18 08:14  3/12/18 13:29


 


 


 Cefazolin Sodium/


 Dextrose  50 ml @ 


 100 mls/hr  ON  CALL


 IV


   3/12/18 08:15


 3/16/18 08:14  3/12/18 15:04


 


 


 Pantoprazole


 Sodium


  (Protonix Inj)  40 mg  Q24H


 IV PUSH


   3/13/18 09:00


    3/14/18 08:15


 


 


 Morphine Sulfate


  (Oramorph Sr)  60 mg  Q8H


 PO


   3/13/18 10:00


    3/14/18 08:17


 


 


 Lorazepam


  (Ativan Inj)  0.5 mg  Q6H  PRN


 IV PUSH


 ANXIETY OR INSOMNIA  3/13/18 09:00


    3/14/18 12:36


 


 


 Lactulose


  (Lactulose Liq)  30 ml  DAILY


 PO


   3/13/18 19:00


    3/14/18 08:17


 








Objective Remarks


GENERAL: Well-nourished, Cachectic.


SKIN: Warm and dry.  R chest wall port.


HEAD: Normocephalic.


EYES: No scleral icterus. No injection or drainage. 


NECK: Supple, trachea midline. No JVD or lymphadenopathy.


LYMPHATIC: No adenopathy.


CARDIOVASCULAR: Regular rate and rhythm without murmurs. 


RESPIRATORY: Breath sounds equal bilaterally. No accessory muscle use.


GASTROINTESTINAL: Abdomen soft, non-tender, nondistended. 


EXTREMITIES: No cyanosis, or edema. 


MUSCULOSKELETAL: Adequate muscle tone.


NEUROLOGICAL: No obvious focal deficit. Awake, alert, and oriented x3.





Assessment/Plan


Problem List:  


(1) Hodgkin lymphoma


ICD Codes:  C81.90 - Hodgkin lymphoma, unspecified, unspecified site


Status:  Acute


Plan:  3/14/18 BM biopsy results still pending.


Proceed with C1D1 ABVD.


Discussed risks and benefits, no more questions at present.





3/13/18: GYN oncology consult, bone marrow biopsy today. 





--HIV negative





--Pulmonary function test:


VOLUMES


DYNAMIC:   FVC and FEV1 normal.


STATIC:   RV and FRC mildly increased; TLC normal.


FLOWS:   FEV1% normal; FEF 25-75 severely reduced.


DIFFUSION:   Low normal.





IMPRESSION:    


--Very mild obstructive ventilatory defect in the terminal airways with mild 

hyperinflation. There is improvement post-bronchodilator. 


--Echocardiogram shows EF of 65-70%





HPI: 4 months ago in December she had developed pneumonia-like symptoms with 

cough.  +running fever.  goes up to 103. ++drenching night sweats. lost 20 

pounds in the last 4 months. complaining of back pain and severe bone pain.  

CAT of the chest, abdomen and pelvis was done which showed lymphadenopathy, 

multiple lesions in the lung, spleen and liver. biopsy of the liver mass and 

also right inguinal lymph node=classical Hodgkin's lymphoma. 


--Dom consulted ([facility]) for possible palliative xrt to painful back lesions

: chemotherapy regimen and radiation treatment cannot be delivered at the same 

time.  If her pain is not improving, consider delivering radiation treatment 

perhaps just prior to her next chemotherapy--could try to limit her radiation 

treatment to 5 fractions.  do not believe she is a candidate for single 

fraction treatment.





(2) Back pain


ICD Codes:  M54.9 - Dorsalgia, unspecified


Status:  Acute


Plan:  3/14/18.  Anticipate pain would improve with treatment.


Currently receiving long acting and breakthrough pain medication.


Discussed with Dr. Cuadra.





3/13: increase Oramorph to 60mg PO TID, continue Dilaudid IV for breakthrough 

pain.


--on scheduled gabapentin for generalized tingling and burning





(3) Insomnia


ICD Codes:  G47.00 - Insomnia, unspecified


Plan:  3/14/18.  Anxiety due to chemo.


Cont Ativan PRN.  Emotional support provided.


Sister at bedside.





--ativan 0.5mg IV PRN











Assessment


37 y/o female admitted with severe back pain with a new diagnosis of classical 

Hodgkin's lymphoma, high IPS.


Plan


1. Cont Oramorph to 60mg PO TID and continue dilaudid for breakthrough


2. Ativan 0.5mg IV PRN anxiety or insomnia. 


3. Proceed with ABVD as ordered.


4. Follow bone marrow biopsy results for staging


5.  OK for DC and follow up as out pt pending pain control.











Hyun Hemphill MD Mar 14, 2018 13:00

## 2018-03-14 NOTE — RADRPT
EXAM DATE/TIME:  03/14/2018 09:22 

 

[facility] COMPARISON:     

No previous studies available for comparison.

 

                     

INDICATIONS :     

Constipation. Severe abdominal pain.

                     

 

MEDICAL HISTORY :            

Hodgkins lymphoma   

 

SURGICAL HISTORY :        

Ovarian cyst removal

 

ENCOUNTER:     

Subsequent                                        

 

ACUITY:     

1 week      

 

PAIN SCORE:     

10/10

 

LOCATION:       

abdomen

 

FINDINGS:     

Supine view of the abdomen was performed.  The abdominal bowel gas pattern is normal except mild cons
tipation.  No abnormal masses, calcifications, or organomegaly is seen.  The osseous structures are u
nremarkable.

 

CONCLUSION:     

1. Mild constipation. No acute findings.

 

 

 

 Tarun Root MD on March 14, 2018 at 11:14           

Board Certified Radiologist.

 This report was verified electronically.

## 2018-03-15 VITALS
SYSTOLIC BLOOD PRESSURE: 114 MMHG | DIASTOLIC BLOOD PRESSURE: 70 MMHG | RESPIRATION RATE: 18 BRPM | HEART RATE: 60 BPM | OXYGEN SATURATION: 95 % | TEMPERATURE: 97.6 F

## 2018-03-15 VITALS — HEART RATE: 52 BPM

## 2018-03-15 VITALS
HEART RATE: 70 BPM | DIASTOLIC BLOOD PRESSURE: 65 MMHG | SYSTOLIC BLOOD PRESSURE: 100 MMHG | TEMPERATURE: 97.9 F | RESPIRATION RATE: 18 BRPM | OXYGEN SATURATION: 98 %

## 2018-03-15 VITALS
TEMPERATURE: 97.2 F | DIASTOLIC BLOOD PRESSURE: 66 MMHG | SYSTOLIC BLOOD PRESSURE: 108 MMHG | OXYGEN SATURATION: 100 % | RESPIRATION RATE: 17 BRPM | HEART RATE: 64 BPM

## 2018-03-15 VITALS
HEART RATE: 66 BPM | RESPIRATION RATE: 16 BRPM | DIASTOLIC BLOOD PRESSURE: 62 MMHG | OXYGEN SATURATION: 100 % | SYSTOLIC BLOOD PRESSURE: 116 MMHG | TEMPERATURE: 98.4 F

## 2018-03-15 VITALS — HEART RATE: 66 BPM

## 2018-03-15 VITALS — HEART RATE: 64 BPM

## 2018-03-15 VITALS — HEART RATE: 55 BPM

## 2018-03-15 VITALS — HEART RATE: 58 BPM

## 2018-03-15 VITALS
DIASTOLIC BLOOD PRESSURE: 66 MMHG | HEART RATE: 58 BPM | SYSTOLIC BLOOD PRESSURE: 119 MMHG | OXYGEN SATURATION: 96 % | RESPIRATION RATE: 18 BRPM | TEMPERATURE: 97.4 F

## 2018-03-15 VITALS
HEART RATE: 72 BPM | SYSTOLIC BLOOD PRESSURE: 114 MMHG | DIASTOLIC BLOOD PRESSURE: 70 MMHG | RESPIRATION RATE: 16 BRPM | OXYGEN SATURATION: 100 % | TEMPERATURE: 97.4 F

## 2018-03-15 VITALS — HEART RATE: 112 BPM

## 2018-03-15 LAB
BASOPHILS # BLD AUTO: 0.1 TH/MM3 (ref 0–0.2)
BASOPHILS NFR BLD: 0.3 % (ref 0–2)
BUN SERPL-MCNC: 12 MG/DL (ref 7–18)
CALCIUM SERPL-MCNC: 8.8 MG/DL (ref 8.5–10.1)
CHLORIDE SERPL-SCNC: 105 MEQ/L (ref 98–107)
CREAT SERPL-MCNC: 0.57 MG/DL (ref 0.5–1)
EOSINOPHIL # BLD: 0 TH/MM3 (ref 0–0.4)
EOSINOPHIL NFR BLD: 0 % (ref 0–4)
ERYTHROCYTE [DISTWIDTH] IN BLOOD BY AUTOMATED COUNT: 19.1 % (ref 11.6–17.2)
GFR SERPLBLD BASED ON 1.73 SQ M-ARVRAT: 118 ML/MIN (ref 89–?)
GLUCOSE SERPL-MCNC: 129 MG/DL (ref 74–106)
HCO3 BLD-SCNC: 27.6 MEQ/L (ref 21–32)
HCT VFR BLD CALC: 29.2 % (ref 35–46)
HGB BLD-MCNC: 9.9 GM/DL (ref 11.6–15.3)
LYMPHOCYTES # BLD AUTO: 0.4 TH/MM3 (ref 1–4.8)
LYMPHOCYTES NFR BLD AUTO: 2.1 % (ref 9–44)
MCH RBC QN AUTO: 26.9 PG (ref 27–34)
MCHC RBC AUTO-ENTMCNC: 33.9 % (ref 32–36)
MCV RBC AUTO: 79.4 FL (ref 80–100)
MONOCYTE #: 0.5 TH/MM3 (ref 0–0.9)
MONOCYTES NFR BLD: 2.8 % (ref 0–8)
NEUTROPHILS # BLD AUTO: 17.9 TH/MM3 (ref 1.8–7.7)
NEUTROPHILS NFR BLD AUTO: 94.8 % (ref 16–70)
PLATELET # BLD: 810 TH/MM3 (ref 150–450)
PMV BLD AUTO: 7 FL (ref 7–11)
RBC # BLD AUTO: 3.68 MIL/MM3 (ref 4–5.3)
SODIUM SERPL-SCNC: 141 MEQ/L (ref 136–145)
WBC # BLD AUTO: 18.9 TH/MM3 (ref 4–11)

## 2018-03-15 RX ADMIN — STANDARDIZED SENNA CONCENTRATE AND DOCUSATE SODIUM SCH TAB: 8.6; 5 TABLET, FILM COATED ORAL at 09:15

## 2018-03-15 RX ADMIN — HYDROMORPHONE HYDROCHLORIDE PRN MG: 2 INJECTION INTRAMUSCULAR; INTRAVENOUS; SUBCUTANEOUS at 09:16

## 2018-03-15 RX ADMIN — STANDARDIZED SENNA CONCENTRATE AND DOCUSATE SODIUM SCH TAB: 8.6; 5 TABLET, FILM COATED ORAL at 19:31

## 2018-03-15 RX ADMIN — DEXAMETHASONE SODIUM PHOSPHATE SCH MG: 4 INJECTION, SOLUTION INTRAMUSCULAR; INTRAVENOUS at 11:12

## 2018-03-15 RX ADMIN — MORPHINE SULFATE SCH MG: 30 TABLET, EXTENDED RELEASE ORAL at 09:15

## 2018-03-15 RX ADMIN — HYDROMORPHONE HYDROCHLORIDE PRN MG: 2 INJECTION INTRAMUSCULAR; INTRAVENOUS; SUBCUTANEOUS at 20:40

## 2018-03-15 RX ADMIN — MORPHINE SULFATE SCH MG: 30 TABLET, EXTENDED RELEASE ORAL at 02:42

## 2018-03-15 RX ADMIN — DEXAMETHASONE SCH MG: 0.5 TABLET ORAL at 19:31

## 2018-03-15 RX ADMIN — GABAPENTIN SCH MG: 300 CAPSULE ORAL at 18:07

## 2018-03-15 RX ADMIN — GABAPENTIN SCH MG: 300 CAPSULE ORAL at 09:15

## 2018-03-15 RX ADMIN — WATER PRN ML: 1 IRRIGANT IRRIGATION at 02:48

## 2018-03-15 RX ADMIN — ALLOPURINOL SCH MG: 300 TABLET ORAL at 09:15

## 2018-03-15 RX ADMIN — PANTOPRAZOLE SODIUM SCH MG: 40 INJECTION, POWDER, FOR SOLUTION INTRAVENOUS at 09:16

## 2018-03-15 RX ADMIN — HYDROMORPHONE HYDROCHLORIDE PRN MG: 2 INJECTION INTRAMUSCULAR; INTRAVENOUS; SUBCUTANEOUS at 05:47

## 2018-03-15 RX ADMIN — DEXAMETHASONE SODIUM PHOSPHATE SCH MG: 4 INJECTION, SOLUTION INTRAMUSCULAR; INTRAVENOUS at 05:45

## 2018-03-15 RX ADMIN — MORPHINE SULFATE SCH MG: 30 TABLET, EXTENDED RELEASE ORAL at 18:09

## 2018-03-15 RX ADMIN — HYDROMORPHONE HYDROCHLORIDE PRN MG: 2 INJECTION INTRAMUSCULAR; INTRAVENOUS; SUBCUTANEOUS at 02:44

## 2018-03-15 RX ADMIN — Medication SCH ML: at 19:36

## 2018-03-15 RX ADMIN — Medication SCH ML: at 09:17

## 2018-03-15 RX ADMIN — GABAPENTIN SCH MG: 300 CAPSULE ORAL at 11:12

## 2018-03-15 RX ADMIN — WATER SCH ML: 1 IRRIGANT IRRIGATION at 09:00

## 2018-03-15 NOTE — HHI.PR
Subjective


Remarks


Pt having continued difficulties with pain control and anxiety.





Objective


Vitals





Vital Signs








  Date Time  Temp Pulse Resp B/P (MAP) Pulse Ox O2 Delivery O2 Flow Rate FiO2


 


3/15/18 10:11  55      


 


3/15/18 08:00 97.4 58 18 119/66 (83) 96   


 


3/15/18 06:00  58      


 


3/15/18 05:00  52      


 


3/15/18 04:00  61      


 


3/15/18 04:00 98.4 66 16 116/62 (80) 100   


 


3/15/18 03:00  112      


 


3/15/18 02:00  66      


 


3/15/18 01:00  64      


 


3/15/18 00:00  75      


 


3/15/18 00:00 97.2 64 17 108/66 (80) 100   


 


3/14/18 23:00  84      


 


3/14/18 22:00  74      


 


3/14/18 21:00  78      


 


3/14/18 20:00  67      


 


3/14/18 20:00 97.2 80 16 125/71 (89) 97   


 


3/14/18 19:00  76      


 


3/14/18 19:00  76      


 


3/14/18 18:22   18     


 


3/14/18 18:00  62      


 


3/14/18 17:00  56      


 


3/14/18 17:00 97.8 51 16 120/59 (79) 97   


 


3/14/18 16:35  53      


 


3/14/18 16:00  56      


 


3/14/18 15:30  61   98   


 


3/14/18 15:00  60      


 


3/14/18 14:43 97.4 84 18 118/64 (82) 99   


 


3/14/18 14:00  92      


 


3/14/18 13:00  66      


 


3/14/18 12:00  82      


 


3/14/18 12:00 97.8 66 18 109/69 (82) 98   


 


3/14/18 12:00  72      


 


3/14/18 12:00  82      


 


3/14/18 11:00  60      








Result Diagram:  


3/15/18 0410                                                                   

             3/15/18 0410





Imaging





Last Impressions








Abdomen X-Ray 3/14/18 0800 Signed





Impressions: 





 Service Date/Time:  Wednesday, March 14, 2018 09:22 - CONCLUSION:  1. Mild 





 constipation. No acute findings.     Tarun Root MD 


 


Bone Biopsy CT 3/13/18 0000 Signed





Impressions: 





 Service Date/Time:  Tuesday, March 13, 2018 14:08 - CONCLUSION:  1.  





 Uncomplicated CT guided bone marrow aspirate. 2.  Uncomplicated CT guided bone 





 marrow biopsy.     Denis Solorio MD 


 


Port Line Insertion 3/12/18 0000 Signed





Impressions: 





 Service Date/Time:  Monday, March 12, 2018 13:03 - CONCLUSION:  Uncomplicated 





 ultrasound and fluoroscopic guided implanted central venous port catheter 





 placement as described in detail above.  An 8 [employer] port was placed.   

  





 Artem Nicholas MD 


 


Thoracic Spine MRI 3/8/18 0000 Signed





Impressions: 





 Service Date/Time:  Thursday, March 8, 2018 17:55 - CONCLUSION:  Signal 





 abnormalities within the T3 and T4 vertebral body and right T4 pedicle with 





 contrast enhancement.  There is also T1 prolongation without contrast 





 enhancement in the T8 vertebral body.  The appearance is nonspecific but is 





 abnormal and suggests possible metastatic disease.     Roby Matos MD 


 


Sacrum/Coccyx MRI 3/8/18 0000 Signed





Impressions: 





 Service Date/Time:  Thursday, March 8, 2018 17:55 - CONCLUSION:  There are 





 signal abnormalities in the superior S2 and bilateral sacroiliac with T2 





 prolongation and contrast enhancement.  This suggests the possibility of 





 metastatic lesions.     Roby Matos MD 


 


Lumbar Spine MRI 3/8/18 0000 Signed





Impressions: 





 Service Date/Time:  Thursday, March 8, 2018 17:55 - CONCLUSION:  1. No 

evidence 





 of disc bulge or protrusion. 2. Triangular shaped area of T2 prolongation in 

the 





 anterior inferior L1 vertebral body with correlating enhancement on the 





 postcontrast study.  This of uncertain significance, but would be an unusual 





 shape and location for a metastatic lesion.     Roby Matos MD 


 


Hip and Pelvis X-Ray 3/7/18 1854 Signed





Impressions: 





 Service Date/Time:  Wednesday, March 7, 2018 19:11 - CONCLUSION:  Negative 





 evaluation of the left hip.     Roby Matos MD 








Objective Remarks


General: NAD, AAOx3


Chest: CTA


Cardiac: Regular


Abd: +BS, soft ND/NT


Ext: No edema





A/P


Problem List:  


(1) Hodgkin lymphoma


ICD Codes:  C81.90 - Hodgkin lymphoma, unspecified, unspecified site


Status:  Acute


Plan:  - Pt had reported unintentional weight loss ~20lbs since 1/2018, night 

sweats and had outpt CT Chest/Abd/Pelvis on 3/2/18


- Outpt Chest CT W/W/O IV contrast (3/2/18):


   - Innumerable masses scattered throughout the lungs. Numerous lymph nodes 

throughout the mediastinum and both hilar regions





- Outpt CT Abd/pelvis W/W/O contrast (3/2/18):


   - There is widespread metastatic disease throughout the spleen, the liver 

and the retroperitoneum. Lymphoma most likely diagnosis


    versus conceivably melanoma or breast cancer. 


   - Indeterminate sclerotic lesion in T12. 


   - Liver with 2.7cm low-density lesion in the lateral segment of the left 

lobe of the liver. At least 5 other low-density lesions scattered


    throughout the liver concerning for metastatic disease. 


   - Spleen is markedly heterogenous appearance with multiple masses. 


   - Retroperitoneum with 2.2cm left periaortic lymph node below the left renal 

vein. At least 3 other retroperitoneal lymph nodes are present. 


   - 3.0 x 1.6cm right inguinal lymph node. Some internal obturator 

lymphadenopathy on the right as well. 





- Pt underwent outpt CT guided liver biopsy and US guided LN biopsy on 3/7/18.


- AFP, CA-19-9, CEA, Ca 125 are WNL


- TSH, Free T4 are WNL





- Pt has been having increased pain in her tailbone and back more recently. 


- Thoracic Spine MRI (3/8/18) --> Signal abnormalities within the T3 and T4 

vertebral body and right T4 pedicle with contrast enhancement.  There is 


 also T1 prolongation without contrast enhancement in the T8 vertebral body.  

The appearance is nonspecific but is abnormal and suggests possible


 metastatic disease. 


- Lumbar Spine MRI (3/8/18) --> No evidence of disc bulge or protrusion. 

Triangular shaped area of T2 prolongation in the anterior inferior L1 vertebral 


 body with correlating enhancement on the postcontrast study.  This of 

uncertain significance, but would be an unusual shape and location for a


 metastatic lesion.  


- Sacral spine MRI (3/8/18) --> There are signal abnormalities in the superior 

S2 and bilateral sacroiliac with T2 prolongation and contrast enhancement.


  This suggests the possibility of metastatic lesions.





- Pathology from lymph nodes shows classic Hodgkin lymphoma. liver bx 

nondiagnostic








- Decadron 4mg IV Q6H started on 3/11


- Trial of Neurontin 300mg TID started on 3/11


- 2D echo (3/10/18):


   - LV systolic function hyperdynamic with estimated EF 65-70%


   - Trace to mild tricuspid valve regurgitation


   - Estimated PA pressure 37.2mmHg


- Pt had PFT with DLCO on 3/12


- Pacqfg-b-[id number] placement on 3/12


- BM biopsy on 3/13, pathology pending. 


- Pt received chemotherapy (3/14/18)


-  Oramorph 60mg TID


- Norco 1-2 tab prn & dilaudid q6h prn breakthrough pain


- case d/w nursing.  Try to to utilize PO medication only. 


- Xanax prn anxiety


- If pain is controlled, will discharge to home 3/16/18





(2) Symptomatic anemia


ICD Codes:  D64.9 - Anemia, unspecified


Status:  Acute


Plan:  


- Pt is a 37 y/o female who was sent to the ED by her PCP for worsening anemia 

and leukocytosis on outpt labs. 


- Pt reported various complaints at the time of admission, including weight 

loss (Approx 20lbs in the last 3 months), intermittent dizziness, 


 fevers, "bone pain", night sweats. 


- Pts outpt H/H has decreased to Hgb 7.4/Hct 22.8, MCV 77, MCH 25 on 3/6/18 and 

was recommended to report to the ED for further eval. 


- Pt had previously reported intermittent black stools and was noted to be 

Hemoccult positive in the ED. 


- She was seen by GI in 2/2017 and had an EGD with dilation for complaints of 

dysphagia and odynophagia. The EGD noted gastritis/superficial 


 ulceration of the antrum, esophagitis in the distal esophagus, and food bolus 

in the stomach. 


- Pt was planned for outpt evaluation with colonoscopy which had not been 

performed yet. 


- Pt has family hx of colon cancer, father. 


- She has received 2 units of PRBCs at admission with H/H improving and stable


- GI following


- Pt had EGD on 3/9/18 which was negative. 


- Pt refused the colonoscopy 


- Monitor H/H 


- PPI


- Supportive care





(3) Fever


ICD Codes:  R50.9 - Fever, unspecified


Status:  Acute


Plan:  


- Pt has had reported night sweats and was noted to have fevers prior to 

admission


- Blood cultures taken in the ED with NGTD


- UA was negative


- Felt to most likely be related to tumor fever or related to malignancy, no 

obvious signs of infection


- Tylenol PRN





(4) Weight loss, unintentional


ICD Codes:  R63.4 - Abnormal weight loss


Status:  Acute


Plan:  


- See above.





(5) Back pain


ICD Codes:  M54.9 - Dorsalgia, unspecified


Status:  Acute


Plan:  


- Pt has been having increased pain in her tailbone and back more recently 

likely related to bone mets. 


- See above





(6) Liver mass


ICD Codes:  R16.0 - Hepatomegaly, not elsewhere classified


Plan:  


- Outpt biopsy was nondiagnostic








Problem Qualifiers





(1) Hodgkin lymphoma:  








Travon Cuadra DO Mar 15, 2018 10:52

## 2018-03-15 NOTE — PD.ONC.PN
Subjective


Subjective


Remarks


Afebrile overnight. 


Patient still having pain and needing breakthrough pain medication. 


Still having itching. 


states the burning in her back has improved significantly. 


she is having constipation. 


she had a problem with constipation before starting opiates and states it has 

worsened since being in the hospital. 


had a very small bowel movement yesterday.





Objective


Data











  Date Time  Temp Pulse Resp B/P (MAP) Pulse Ox O2 Delivery O2 Flow Rate FiO2


 


3/15/18 10:11  55      


 


3/15/18 08:00 97.4 58 18 119/66 (83) 96   


 


3/15/18 06:00  58      


 


3/15/18 05:00  52      


 


3/15/18 04:00  61      


 


3/15/18 04:00 98.4 66 16 116/62 (80) 100   


 


3/15/18 03:00  112      


 


3/15/18 02:00  66      


 


3/15/18 01:00  64      


 


3/15/18 00:00  75      


 


3/15/18 00:00 97.2 64 17 108/66 (80) 100   


 


3/14/18 23:00  84      


 


3/14/18 22:00  74      


 


3/14/18 21:00  78      


 


3/14/18 20:00  67      


 


3/14/18 20:00 97.2 80 16 125/71 (89) 97   


 


3/14/18 19:00  76      


 


3/14/18 19:00  76      


 


3/14/18 18:22   18     


 


3/14/18 18:00  62      


 


3/14/18 17:00  56      


 


3/14/18 17:00 97.8 51 16 120/59 (79) 97   


 


3/14/18 16:35  53      


 


3/14/18 16:00  56      


 


3/14/18 15:30  61   98   


 


3/14/18 15:00  60      


 


3/14/18 14:43 97.4 84 18 118/64 (82) 99   


 


3/14/18 14:00  92      


 


3/14/18 13:00  66      


 


3/14/18 12:00  82      


 


3/14/18 12:00 97.8 66 18 109/69 (82) 98   


 


3/14/18 12:00  72      


 


3/14/18 12:00  82      














 3/15/18 3/15/18 3/15/18





 07:00 15:00 23:00


 


Intake Total 1680 ml  


 


Output Total 3300 ml  


 


Balance -1620 ml  








Result Diagram:  


3/15/18 0410                                                                   

             3/15/18 0410





Laboratory Results





Laboratory Tests








Test


  3/15/18


04:10


 


White Blood Count 18.9 TH/MM3 


 


Red Blood Count 3.68 MIL/MM3 


 


Hemoglobin 9.9 GM/DL 


 


Hematocrit 29.2 % 


 


Mean Corpuscular Volume 79.4 FL 


 


Mean Corpuscular Hemoglobin 26.9 PG 


 


Mean Corpuscular Hemoglobin


Concent 33.9 % 


 


 


Red Cell Distribution Width 19.1 % 


 


Platelet Count 810 TH/MM3 


 


Mean Platelet Volume 7.0 FL 


 


Neutrophils (%) (Auto) 94.8 % 


 


Lymphocytes (%) (Auto) 2.1 % 


 


Monocytes (%) (Auto) 2.8 % 


 


Eosinophils (%) (Auto) 0.0 % 


 


Basophils (%) (Auto) 0.3 % 


 


Neutrophils # (Auto) 17.9 TH/MM3 


 


Lymphocytes # (Auto) 0.4 TH/MM3 


 


Monocytes # (Auto) 0.5 TH/MM3 


 


Eosinophils # (Auto) 0.0 TH/MM3 


 


Basophils # (Auto) 0.1 TH/MM3 


 


CBC Comment DIFF FINAL 


 


Differential Comment  


 


Blood Urea Nitrogen 12 MG/DL 


 


Creatinine 0.57 MG/DL 


 


Random Glucose 129 MG/DL 


 


Calcium Level 8.8 MG/DL 


 


Uric Acid 2.3 MG/DL 


 


Lactate Dehydrogenase 242 U/L 


 


Sodium Level 141 MEQ/L 


 


Potassium Level 4.1 MEQ/L 


 


Chloride Level 105 MEQ/L 


 


Carbon Dioxide Level 27.6 MEQ/L 


 


Anion Gap 8 MEQ/L 


 


Estimat Glomerular Filtration


Rate 118 ML/MIN 


 











Administered Medications








 Medications


  (Trade)  Dose


 Ordered  Sig/Nohemi


 Route


 PRN Reason  Start Time


 Stop Time Status Last Admin


Dose Admin


 


 Sodium Chloride


  (NS Flush)  2 ml  BID


 IV FLUSH


   3/8/18 09:00


    3/15/18 09:17


 


 


 Acetaminophen


  (Tylenol)  650 mg  Q4H  PRN


 PO


 TEMP > 100.4  3/7/18 21:30


    3/10/18 23:25


 


 


 Senna/Docusate


 Sodium


  (Thelma-Colace)  1 tab  BID


 PO


   3/8/18 09:00


    3/15/18 09:15


 


 


 Magnesium


 Hydroxide


  (Milk Of


 Magnesia Liq)  30 ml  Q12H  PRN


 PO


 Mild constipation  3/7/18 21:30


    3/14/18 05:38


 


 


 Lactulose


  (Lactulose Liq)  30 ml  DAILY  PRN


 PO


 SEVERE CONSITIPATION  3/7/18 21:30


    3/15/18 02:48


 


 


 Acetaminophen/


 Hydrocodone Bitart


  (Norco  5-325 Mg)  1 tab  Q4H  PRN


 PO


 pain 2-5  3/8/18 15:15


    3/9/18 19:50


 


 


 Allopurinol


  (Zyloprim)  300 mg  DAILY


 PO


   3/11/18 09:00


    3/15/18 09:15


 


 


 Dexamethasone


 Sodium Phosphate


  (Decadron Inj)  4 mg  Q6HR


 IV PUSH


   3/11/18 00:00


    3/15/18 05:45


 


 


 Gabapentin


  (Neurontin)  300 mg  TID


 PO


   3/11/18 09:00


    3/15/18 09:15


 


 


 Hydroxyzine HCl


  (Atarax)  25 mg  Q6H


 PO


   3/11/18 09:00


    3/15/18 09:15


 


 


 Hydroxyzine HCl


  (Atarax)  25 mg  Q6H  PRN


 PO


 breakthrough itch  3/11/18 09:00


    3/15/18 04:12


 


 


 Vancomycin HCl


 1000 mg/Sodium


 Chloride  250 ml @ 


 250 mls/hr  ON  CALL


 IV


   3/12/18 08:15


 3/16/18 08:14  3/12/18 13:29


 


 


 Cefazolin Sodium/


 Dextrose  50 ml @ 


 100 mls/hr  ON  CALL


 IV


   3/12/18 08:15


 3/16/18 08:14  3/12/18 15:04


 


 


 Pantoprazole


 Sodium


  (Protonix Inj)  40 mg  Q24H


 IV PUSH


   3/13/18 09:00


    3/15/18 09:16


 


 


 Morphine Sulfate


  (Oramorph Sr)  60 mg  Q8H


 PO


   3/13/18 10:00


    3/15/18 09:15


 


 


 Lactulose


  (Lactulose Liq)  30 ml  DAILY


 PO


   3/13/18 19:00


    3/14/18 08:17


 








Objective Remarks


GENERAL: Pleasant female, sitting up in bed, mildly anxious.


SKIN: Warm and dry.


HEAD: Normocephalic.


EYES: No injection or drainage. 


NECK: Supple, trachea midline.


CARDIOVASCULAR: Regular rate and rhythm


RESPIRATORY: Breath sounds equal bilaterally. No accessory muscle use.


GASTROINTESTINAL: Abdomen soft, non-tender, nondistended. 


EXTREMITIES: No cyanosis


NEUROLOGICAL: awake and alert, normal speech. moving all extremities.





Assessment/Plan


Problem List:  


(1) Hodgkin lymphoma


ICD Codes:  C81.90 - Hodgkin lymphoma, unspecified, unspecified site


Status:  Acute


Plan:  3/15/18: s/p ABVD yesterday.  discussed changing pain medications to PO 

and addressing constipation.  discussed that she can go home when her pain is 

controlled. 





3/14/18 BM biopsy results still pending.


Proceed with C1D1 ABVD.


Discussed risks and benefits, no more questions at present.





3/13/18: GYN oncology consult, bone marrow biopsy today. 





--HIV negative





--Pulmonary function test:


VOLUMES


DYNAMIC:   FVC and FEV1 normal.


STATIC:   RV and FRC mildly increased; TLC normal.


FLOWS:   FEV1% normal; FEF 25-75 severely reduced.


DIFFUSION:   Low normal.





IMPRESSION:    


--Very mild obstructive ventilatory defect in the terminal airways with mild 

hyperinflation. There is improvement post-bronchodilator. 


--Echocardiogram shows EF of 65-70%





HPI: 4 months ago in December she had developed pneumonia-like symptoms with 

cough.  +running fever.  goes up to 103. ++drenching night sweats. lost 20 

pounds in the last 4 months. complaining of back pain and severe bone pain.  

CAT of the chest, abdomen and pelvis was done which showed lymphadenopathy, 

multiple lesions in the lung, spleen and liver. biopsy of the liver mass and 

also right inguinal lymph node=classical Hodgkin's lymphoma. 


--Dom consulted ([facility]) for possible palliative xrt to painful back lesions

: chemotherapy regimen and radiation treatment cannot be delivered at the same 

time.  If her pain is not improving, consider delivering radiation treatment 

perhaps just prior to her next chemotherapy--could try to limit her radiation 

treatment to 5 fractions.  do not believe she is a candidate for single 

fraction treatment.





(2) Back pain


ICD Codes:  M54.9 - Dorsalgia, unspecified


Status:  Acute


Plan:  3/15/18: continue Oramorph at 60mg PO Q 8 hours. start PO Oxycodone 5 or 

10mg depending on pain level.  continue IV dilaudid for severe breakthrough 

pain. 





3/14/18.  Anticipate pain would improve with treatment.


Currently receiving long acting and breakthrough pain medication.


Discussed with Dr. Cuadra.





3/13: increase Oramorph to 60mg PO TID, continue Dilaudid IV for breakthrough 

pain.


--on scheduled gabapentin for generalized tingling and burning





(3) Insomnia


ICD Codes:  G47.00 - Insomnia, unspecified


Plan:  --on xanax PO PRN





Assessment


37 y/o female admitted with severe back pain with a new diagnosis of classical 

Hodgkin's lymphoma, high IPS.


Plan


1. oncology clear for discharge once pain controlled. 


2. give Relistor 12mg SQ x 1


3. increase thelma-colace to 2 tabs PO BID


4. continue PRN lactulose. 


5. wean off steroids--reduce to decadron 2mg PO q 12 hours today, will continue 

to wean tomorrow


Attending Statement


The exam, history, and the medical decision-making described in the above note 

were completed with the assistance of the mid-level provider. I reviewed and 

agree with the findings presented.  I attest that I had a face-to-face 

encounter with the patient on the same day, and personally performed and 

documented my assessment and findings in the medical record.








Sleepy from Xanax. 


Last dose of pain meds > 6 hours ago, requesting dose now.


Tolerated chemotherapy well.


Titrate down Decadron.


Bone marrow biopsy still pending.





Problem Qualifiers





(1) Hodgkin lymphoma:  








Ira Alston Mar 15, 2018 11:08


Hyun Hemphill MD Mar 15, 2018 20:05

## 2018-03-16 VITALS
HEART RATE: 80 BPM | OXYGEN SATURATION: 100 % | TEMPERATURE: 96.5 F | SYSTOLIC BLOOD PRESSURE: 122 MMHG | DIASTOLIC BLOOD PRESSURE: 92 MMHG | RESPIRATION RATE: 16 BRPM

## 2018-03-16 VITALS
HEART RATE: 60 BPM | DIASTOLIC BLOOD PRESSURE: 68 MMHG | OXYGEN SATURATION: 99 % | TEMPERATURE: 97.5 F | RESPIRATION RATE: 18 BRPM | SYSTOLIC BLOOD PRESSURE: 110 MMHG

## 2018-03-16 VITALS
TEMPERATURE: 97.9 F | SYSTOLIC BLOOD PRESSURE: 119 MMHG | DIASTOLIC BLOOD PRESSURE: 75 MMHG | OXYGEN SATURATION: 99 % | RESPIRATION RATE: 18 BRPM | HEART RATE: 85 BPM

## 2018-03-16 VITALS
DIASTOLIC BLOOD PRESSURE: 60 MMHG | OXYGEN SATURATION: 99 % | RESPIRATION RATE: 18 BRPM | TEMPERATURE: 97.7 F | HEART RATE: 55 BPM | SYSTOLIC BLOOD PRESSURE: 105 MMHG

## 2018-03-16 VITALS
TEMPERATURE: 96.8 F | HEART RATE: 60 BPM | RESPIRATION RATE: 16 BRPM | SYSTOLIC BLOOD PRESSURE: 108 MMHG | DIASTOLIC BLOOD PRESSURE: 72 MMHG | OXYGEN SATURATION: 100 %

## 2018-03-16 VITALS
HEART RATE: 76 BPM | DIASTOLIC BLOOD PRESSURE: 73 MMHG | SYSTOLIC BLOOD PRESSURE: 113 MMHG | RESPIRATION RATE: 16 BRPM | TEMPERATURE: 98.3 F | OXYGEN SATURATION: 100 %

## 2018-03-16 LAB
BASOPHILS # BLD AUTO: 0.1 TH/MM3 (ref 0–0.2)
BASOPHILS NFR BLD: 0.4 % (ref 0–2)
BUN SERPL-MCNC: 17 MG/DL (ref 7–18)
CALCIUM SERPL-MCNC: 8.4 MG/DL (ref 8.5–10.1)
CHLORIDE SERPL-SCNC: 102 MEQ/L (ref 98–107)
CREAT SERPL-MCNC: 0.63 MG/DL (ref 0.5–1)
EOSINOPHIL # BLD: 0 TH/MM3 (ref 0–0.4)
EOSINOPHIL NFR BLD: 0 % (ref 0–4)
ERYTHROCYTE [DISTWIDTH] IN BLOOD BY AUTOMATED COUNT: 19.1 % (ref 11.6–17.2)
GFR SERPLBLD BASED ON 1.73 SQ M-ARVRAT: 105 ML/MIN (ref 89–?)
GLUCOSE SERPL-MCNC: 88 MG/DL (ref 74–106)
HCO3 BLD-SCNC: 28.1 MEQ/L (ref 21–32)
HCT VFR BLD CALC: 29.9 % (ref 35–46)
HGB BLD-MCNC: 9.8 GM/DL (ref 11.6–15.3)
LYMPHOCYTES # BLD AUTO: 0.5 TH/MM3 (ref 1–4.8)
LYMPHOCYTES NFR BLD AUTO: 3.8 % (ref 9–44)
MCH RBC QN AUTO: 25.7 PG (ref 27–34)
MCHC RBC AUTO-ENTMCNC: 32.9 % (ref 32–36)
MCV RBC AUTO: 78.1 FL (ref 80–100)
MONOCYTE #: 0.3 TH/MM3 (ref 0–0.9)
MONOCYTES NFR BLD: 2.5 % (ref 0–8)
NEUTROPHILS # BLD AUTO: 11.6 TH/MM3 (ref 1.8–7.7)
NEUTROPHILS NFR BLD AUTO: 93.3 % (ref 16–70)
PLATELET # BLD: 700 TH/MM3 (ref 150–450)
PMV BLD AUTO: 7.1 FL (ref 7–11)
RBC # BLD AUTO: 3.82 MIL/MM3 (ref 4–5.3)
SODIUM SERPL-SCNC: 138 MEQ/L (ref 136–145)
WBC # BLD AUTO: 12.5 TH/MM3 (ref 4–11)

## 2018-03-16 RX ADMIN — STANDARDIZED SENNA CONCENTRATE AND DOCUSATE SODIUM SCH TAB: 8.6; 5 TABLET, FILM COATED ORAL at 20:40

## 2018-03-16 RX ADMIN — GABAPENTIN SCH MG: 300 CAPSULE ORAL at 20:40

## 2018-03-16 RX ADMIN — MORPHINE SULFATE SCH MG: 30 TABLET, EXTENDED RELEASE ORAL at 01:06

## 2018-03-16 RX ADMIN — MORPHINE SULFATE SCH MG: 30 TABLET, EXTENDED RELEASE ORAL at 18:21

## 2018-03-16 RX ADMIN — HYDROMORPHONE HYDROCHLORIDE PRN MG: 2 INJECTION INTRAMUSCULAR; INTRAVENOUS; SUBCUTANEOUS at 19:40

## 2018-03-16 RX ADMIN — PANTOPRAZOLE SCH MG: 40 TABLET, DELAYED RELEASE ORAL at 07:39

## 2018-03-16 RX ADMIN — GABAPENTIN SCH MG: 300 CAPSULE ORAL at 12:23

## 2018-03-16 RX ADMIN — WATER SCH ML: 1 IRRIGANT IRRIGATION at 07:38

## 2018-03-16 RX ADMIN — STANDARDIZED SENNA CONCENTRATE AND DOCUSATE SODIUM SCH TAB: 8.6; 5 TABLET, FILM COATED ORAL at 07:39

## 2018-03-16 RX ADMIN — DEXAMETHASONE SCH MG: 0.5 TABLET ORAL at 20:40

## 2018-03-16 RX ADMIN — ALLOPURINOL SCH MG: 300 TABLET ORAL at 07:39

## 2018-03-16 RX ADMIN — GABAPENTIN SCH MG: 300 CAPSULE ORAL at 06:09

## 2018-03-16 RX ADMIN — MORPHINE SULFATE SCH MG: 30 TABLET, EXTENDED RELEASE ORAL at 09:07

## 2018-03-16 RX ADMIN — HYDROMORPHONE HYDROCHLORIDE PRN MG: 2 INJECTION INTRAMUSCULAR; INTRAVENOUS; SUBCUTANEOUS at 06:01

## 2018-03-16 RX ADMIN — Medication SCH ML: at 09:00

## 2018-03-16 RX ADMIN — Medication SCH ML: at 20:41

## 2018-03-16 RX ADMIN — HYDROMORPHONE HYDROCHLORIDE PRN MG: 2 INJECTION INTRAMUSCULAR; INTRAVENOUS; SUBCUTANEOUS at 16:20

## 2018-03-16 RX ADMIN — WATER SCH ML: 1 IRRIGANT IRRIGATION at 20:40

## 2018-03-16 RX ADMIN — DEXAMETHASONE SCH MG: 0.5 TABLET ORAL at 07:39

## 2018-03-16 NOTE — HHI.PR
Subjective


Remarks


Patient required breakthrough IV pain medication


c/o nerve type spasms sacral area


also c/o constipation





Objective


Vitals





Vital Signs








  Date Time  Temp Pulse Resp B/P (MAP) Pulse Ox O2 Delivery O2 Flow Rate FiO2


 


3/16/18 08:00 97.7 55 18 105/60 (75) 99   


 


3/16/18 04:00 96.8 60 16 108/72 (84) 100   


 


3/16/18 00:00 96.5 80 16 122/92 (102) 100   


 


3/15/18 20:00 97.4 72 16 114/70 (85) 100   


 


3/15/18 16:00 97.9 70 18 100/65 (77) 98   


 


3/15/18 12:00 97.6 60 18 114/70 (85) 95   








Result Diagram:  


3/16/18 0315                                                                   

             3/16/18 0315





Other Results





 Laboratory Tests








Test


  3/13/18


13:35 3/14/18


04:30 3/15/18


04:10 3/16/18


03:15


 


Bone Marrow Immunophenotyping     


 


White Blood Count  16.8 TH/MM3  18.9 TH/MM3  12.5 TH/MM3 


 


Red Blood Count  3.82 MIL/MM3  3.68 MIL/MM3  3.82 MIL/MM3 


 


Hemoglobin  9.7 GM/DL  9.9 GM/DL  9.8 GM/DL 


 


Hematocrit  30.2 %  29.2 %  29.9 % 


 


Mean Corpuscular Volume  78.9 FL  79.4 FL  78.1 FL 


 


Mean Corpuscular Hemoglobin  25.3 PG  26.9 PG  25.7 PG 


 


Mean Corpuscular Hemoglobin


Concent 


  32.1 % 


  33.9 % 


  32.9 % 


 


 


Red Cell Distribution Width  19.0 %  19.1 %  19.1 % 


 


Platelet Count  925 TH/MM3  810 TH/MM3  700 TH/MM3 


 


Mean Platelet Volume  6.8 FL  7.0 FL  7.1 FL 


 


Neutrophils (%) (Auto)  94.7 %  94.8 %  93.3 % 


 


Lymphocytes (%) (Auto)  1.9 %  2.1 %  3.8 % 


 


Monocytes (%) (Auto)  3.3 %  2.8 %  2.5 % 


 


Eosinophils (%) (Auto)  0.0 %  0.0 %  0.0 % 


 


Basophils (%) (Auto)  0.1 %  0.3 %  0.4 % 


 


Neutrophils # (Auto)  15.9 TH/MM3  17.9 TH/MM3  11.6 TH/MM3 


 


Lymphocytes # (Auto)  0.3 TH/MM3  0.4 TH/MM3  0.5 TH/MM3 


 


Monocytes # (Auto)  0.6 TH/MM3  0.5 TH/MM3  0.3 TH/MM3 


 


Eosinophils # (Auto)  0.0 TH/MM3  0.0 TH/MM3  0.0 TH/MM3 


 


Basophils # (Auto)  0.0 TH/MM3  0.1 TH/MM3  0.1 TH/MM3 


 


CBC Comment  AUTO DIFF  DIFF FINAL  DIFF FINAL 


 


Differential Comment


  


  AUTO DIFF


CONFIRMED  


   


 


 


Blood Urea Nitrogen  14 MG/DL  12 MG/DL  17 MG/DL 


 


Creatinine  0.64 MG/DL  0.57 MG/DL  0.63 MG/DL 


 


Random Glucose  183 MG/DL  129 MG/DL  88 MG/DL 


 


Calcium Level  9.1 MG/DL  8.8 MG/DL  8.4 MG/DL 


 


Uric Acid  2.1 MG/DL  2.3 MG/DL  2.6 MG/DL 


 


Lactate Dehydrogenase  140 U/L  242 U/L  207 U/L 


 


Sodium Level  139 MEQ/L  141 MEQ/L  138 MEQ/L 


 


Potassium Level  4.0 MEQ/L  4.1 MEQ/L  4.1 MEQ/L 


 


Chloride Level  104 MEQ/L  105 MEQ/L  102 MEQ/L 


 


Carbon Dioxide Level  25.5 MEQ/L  27.6 MEQ/L  28.1 MEQ/L 


 


Anion Gap  10 MEQ/L  8 MEQ/L  8 MEQ/L 


 


Estimat Glomerular Filtration


Rate 


  103 ML/MIN 


  118 ML/MIN 


  105 ML/MIN 


 








Imaging





Last Impressions








Abdomen X-Ray 3/14/18 0800 Signed





Impressions: 





 Service Date/Time:  Wednesday, March 14, 2018 09:22 - CONCLUSION:  1. Mild 





 constipation. No acute findings.     Tarun Root MD 


 


Bone Biopsy CT 3/13/18 0000 Signed





Impressions: 





 Service Date/Time:  Tuesday, March 13, 2018 14:08 - CONCLUSION:  1.  





 Uncomplicated CT guided bone marrow aspirate. 2.  Uncomplicated CT guided bone 





 marrow biopsy.     Denis Solorio MD 


 


Port Line Insertion 3/12/18 0000 Signed





Impressions: 





 Service Date/Time:  Monday, March 12, 2018 13:03 - CONCLUSION:  Uncomplicated 





 ultrasound and fluoroscopic guided implanted central venous port catheter 





 placement as described in detail above.  An 8 [employer] Power port was placed.   

  





 Artem Nicholas MD 


 


Thoracic Spine MRI 3/8/18 0000 Signed





Impressions: 





 Service Date/Time:  Thursday, March 8, 2018 17:55 - CONCLUSION:  Signal 





 abnormalities within the T3 and T4 vertebral body and right T4 pedicle with 





 contrast enhancement.  There is also T1 prolongation without contrast 





 enhancement in the T8 vertebral body.  The appearance is nonspecific but is 





 abnormal and suggests possible metastatic disease.     Roby Matos MD 


 


Sacrum/Coccyx MRI 3/8/18 0000 Signed





Impressions: 





 Service Date/Time:  Thursday, March 8, 2018 17:55 - CONCLUSION:  There are 





 signal abnormalities in the superior S2 and bilateral sacroiliac with T2 





 prolongation and contrast enhancement.  This suggests the possibility of 





 metastatic lesions.     Roby Matos MD 


 


Lumbar Spine MRI 3/8/18 0000 Signed





Impressions: 





 Service Date/Time:  Thursday, March 8, 2018 17:55 - CONCLUSION:  1. No 

evidence 





 of disc bulge or protrusion. 2. Triangular shaped area of T2 prolongation in 

the 





 anterior inferior L1 vertebral body with correlating enhancement on the 





 postcontrast study.  This of uncertain significance, but would be an unusual 





 shape and location for a metastatic lesion.     Roby Matos MD 


 


Hip and Pelvis X-Ray 3/7/18 1854 Signed





Impressions: 





 Service Date/Time:  Wednesday, March 7, 2018 19:11 - CONCLUSION:  Negative 





 evaluation of the left hip.     Roby Matos MD 








Objective Remarks


General: NAD, AAOx3


Chest: CTA


Cardiac: Regular


Abd: hypoactive BS, soft ND/NT


Ext: No edema





A/P


Problem List:  


(1) Hodgkin lymphoma


ICD Codes:  C81.90 - Hodgkin lymphoma, unspecified, unspecified site


Status:  Acute


Plan:  - Pt had reported unintentional weight loss ~20lbs since 1/2018, night 

sweats and had outpt CT Chest/Abd/Pelvis on 3/2/18


- Outpt Chest CT W/W/O IV contrast (3/2/18):


   - Innumerable masses scattered throughout the lungs. Numerous lymph nodes 

throughout the mediastinum and both hilar regions





- Outpt CT Abd/pelvis W/W/O contrast (3/2/18):


   - There is widespread metastatic disease throughout the spleen, the liver 

and the retroperitoneum. Lymphoma most likely diagnosis


    versus conceivably melanoma or breast cancer. 


   - Indeterminate sclerotic lesion in T12. 


   - Liver with 2.7cm low-density lesion in the lateral segment of the left 

lobe of the liver. At least 5 other low-density lesions scattered


    throughout the liver concerning for metastatic disease. 


   - Spleen is markedly heterogenous appearance with multiple masses. 


   - Retroperitoneum with 2.2cm left periaortic lymph node below the left renal 

vein. At least 3 other retroperitoneal lymph nodes are present. 


   - 3.0 x 1.6cm right inguinal lymph node. Some internal obturator 

lymphadenopathy on the right as well. 





- Pt underwent outpt CT guided liver biopsy and US guided LN biopsy on 3/7/18.


- AFP, CA-19-9, CEA, Ca 125 are WNL


- TSH, Free T4 are WNL





- Pt has been having increased pain in her tailbone and back more recently. 


- Thoracic Spine MRI (3/8/18) --> Signal abnormalities within the T3 and T4 

vertebral body and right T4 pedicle with contrast enhancement.  There is 


 also T1 prolongation without contrast enhancement in the T8 vertebral body.  

The appearance is nonspecific but is abnormal and suggests possible


 metastatic disease. 


- Lumbar Spine MRI (3/8/18) --> No evidence of disc bulge or protrusion. 

Triangular shaped area of T2 prolongation in the anterior inferior L1 vertebral 


 body with correlating enhancement on the postcontrast study.  This of 

uncertain significance, but would be an unusual shape and location for a


 metastatic lesion.  


- Sacral spine MRI (3/8/18) --> There are signal abnormalities in the superior 

S2 and bilateral sacroiliac with T2 prolongation and contrast enhancement.


  This suggests the possibility of metastatic lesions.





- Pathology from lymph nodes shows classic Hodgkin lymphoma. liver bx 

nondiagnostic








- Decadron per oncology


- Increased Neurontin to 600mg TID started on 3/16


- 2D echo (3/10/18):


   - LV systolic function hyperdynamic with estimated EF 65-70%


   - Trace to mild tricuspid valve regurgitation


   - Estimated PA pressure 37.2mmHg


- Pt had PFT with DLCO on 3/12


- Cnopks-t-Awxi placement on 3/12


- BM biopsy on 3/13, pathology pending. 


- Pt received chemotherapy (3/14/18)


-  Oramorph 60mg TID


- oxycodone increased 10-15 mg  prn & dilaudid q6h prn breakthrough pain


- case d/w nursing.  Try to to utilize PO medication only. 


- Xanax prn anxiety





(2) Symptomatic anemia


ICD Codes:  D64.9 - Anemia, unspecified


Status:  Acute


Plan:  


- Pt is a 37 y/o female who was sent to the ED by her PCP for worsening anemia 

and leukocytosis on outpt labs. 


- Pt reported various complaints at the time of admission, including weight 

loss (Approx 20lbs in the last 3 months), intermittent dizziness, 


 fevers, "bone pain", night sweats. 


- Pts outpt H/H has decreased to Hgb 7.4/Hct 22.8, MCV 77, MCH 25 on 3/6/18 and 

was recommended to report to the ED for further eval. 


- Pt had previously reported intermittent black stools and was noted to be 

Hemoccult positive in the ED. 


- She was seen by GI in 2/2017 and had an EGD with dilation for complaints of 

dysphagia and odynophagia. The EGD noted gastritis/superficial 


 ulceration of the antrum, esophagitis in the distal esophagus, and food bolus 

in the stomach. 


- Pt was planned for outpt evaluation with colonoscopy which had not been 

performed yet. 


- Pt has family hx of colon cancer, father. 


- She has received 2 units of PRBCs at admission with H/H improving and stable


- GI following


- Pt had EGD on 3/9/18 which was negative. 


- Pt refused the colonoscopy 


- Monitor H/H 


- PPI


- Supportive care





(3) Fever


ICD Codes:  R50.9 - Fever, unspecified


Status:  Resolved


Plan:  


- Pt has had reported night sweats and was noted to have fevers prior to 

admission


- Blood cultures taken in the ED with NGTD


- UA was negative


- Felt to most likely be related to tumor fever or related to malignancy, no 

obvious signs of infection


- Tylenol PRN





(4) Weight loss, unintentional


ICD Codes:  R63.4 - Abnormal weight loss


Status:  Acute


Plan:  


- See above.





(5) Back pain


ICD Codes:  M54.9 - Dorsalgia, unspecified


Status:  Acute


Plan:  


- Pt has been having increased pain in her tailbone and back more recently 

likely related to bone mets. 


- See above





(6) Liver mass


ICD Codes:  R16.0 - Hepatomegaly, not elsewhere classified


Plan:  


- Outpt biopsy was nondiagnostic





(7) Constipation


ICD Codes:  K59.00 - Constipation, unspecified


Plan:  Patient on Thelma-colace 2 tablets BID


increase lactulose to BID


add Fleets enema


KUB requested


Patient had one dose of relistor 3/15


plan to give additional Relistor after review of KUB





Assessment and Plan


Patient examined.


Assessment and plan formulated with Elva Means PA-C.


I agree with the above.





Pt quite painful this morning. Pt can NOT be discharged to home until 

controlled with PO pain medication regimen. 


Poor bowel sounds today. Obtain KUB. If no evidence of obstruction, will give 

Relistor. 


scheduled Oramorph


gabapentin increased


prn oxycodone also increased. 


continue BID pericolace


increase lactulose to BID





Problem Qualifiers





(1) Hodgkin lymphoma:  








Elva Means Mar 16, 2018 12:04


Travon Cuadra DO Mar 16, 2018 12:42

## 2018-03-16 NOTE — PD.ONC.PN
Subjective


Subjective


Remarks


Afebrile overnight. 


Patient woke up feeling nauseated today. 


she is still having significant pains requiring breakthrough medication, mostly 

in her back and collarbone, near her port. 


she states the oxycodone does help relieve her pain. 


no bowel movement.





Objective


Data











  Date Time  Temp Pulse Resp B/P (MAP) Pulse Ox O2 Delivery O2 Flow Rate FiO2


 


3/16/18 08:00 97.7 55 18 105/60 (75) 99   


 


3/16/18 04:00 96.8 60 16 108/72 (84) 100   


 


3/16/18 00:00 96.5 80 16 122/92 (102) 100   


 


3/15/18 20:00 97.4 72 16 114/70 (85) 100   


 


3/15/18 16:00 97.9 70 18 100/65 (77) 98   


 


3/15/18 12:00 97.6 60 18 114/70 (85) 95   


 


3/15/18 10:11  55      














 3/16/18 3/16/18 3/16/18





 07:00 15:00 23:00


 


Output Total 1600 ml  


 


Balance -1600 ml  








Result Diagram:  


3/16/18 0315                                                                   

             3/16/18 0315





Laboratory Results





Laboratory Tests








Test


  3/16/18


03:15


 


White Blood Count 12.5 TH/MM3 


 


Red Blood Count 3.82 MIL/MM3 


 


Hemoglobin 9.8 GM/DL 


 


Hematocrit 29.9 % 


 


Mean Corpuscular Volume 78.1 FL 


 


Mean Corpuscular Hemoglobin 25.7 PG 


 


Mean Corpuscular Hemoglobin


Concent 32.9 % 


 


 


Red Cell Distribution Width 19.1 % 


 


Platelet Count 700 TH/MM3 


 


Mean Platelet Volume 7.1 FL 


 


Neutrophils (%) (Auto) 93.3 % 


 


Lymphocytes (%) (Auto) 3.8 % 


 


Monocytes (%) (Auto) 2.5 % 


 


Eosinophils (%) (Auto) 0.0 % 


 


Basophils (%) (Auto) 0.4 % 


 


Neutrophils # (Auto) 11.6 TH/MM3 


 


Lymphocytes # (Auto) 0.5 TH/MM3 


 


Monocytes # (Auto) 0.3 TH/MM3 


 


Eosinophils # (Auto) 0.0 TH/MM3 


 


Basophils # (Auto) 0.1 TH/MM3 


 


CBC Comment DIFF FINAL 


 


Differential Comment  


 


Blood Urea Nitrogen 17 MG/DL 


 


Creatinine 0.63 MG/DL 


 


Random Glucose 88 MG/DL 


 


Calcium Level 8.4 MG/DL 


 


Uric Acid 2.6 MG/DL 


 


Lactate Dehydrogenase 207 U/L 


 


Sodium Level 138 MEQ/L 


 


Potassium Level 4.1 MEQ/L 


 


Chloride Level 102 MEQ/L 


 


Carbon Dioxide Level 28.1 MEQ/L 


 


Anion Gap 8 MEQ/L 


 


Estimat Glomerular Filtration


Rate 105 ML/MIN 


 











Administered Medications








 Medications


  (Trade)  Dose


 Ordered  Sig/Nohemi


 Route


 PRN Reason  Start Time


 Stop Time Status Last Admin


Dose Admin


 


 Sodium Chloride


  (NS Flush)  2 ml  BID


 IV FLUSH


   3/8/18 09:00


    3/15/18 19:36


 


 


 Acetaminophen


  (Tylenol)  650 mg  Q4H  PRN


 PO


 TEMP > 100.4  3/7/18 21:30


    3/10/18 23:25


 


 


 Magnesium


 Hydroxide


  (Milk Of


 Magnesia Liq)  30 ml  Q12H  PRN


 PO


 Mild constipation  3/7/18 21:30


    3/14/18 05:38


 


 


 Allopurinol


  (Zyloprim)  300 mg  DAILY


 PO


   3/11/18 09:00


    3/16/18 07:39


 


 


 Gabapentin


  (Neurontin)  300 mg  TID


 PO


   3/11/18 09:00


    3/16/18 06:09


 


 


 Hydroxyzine HCl


  (Atarax)  25 mg  Q6H


 PO


   3/11/18 09:00


    3/16/18 07:39


 


 


 Hydroxyzine HCl


  (Atarax)  25 mg  Q6H  PRN


 PO


 breakthrough itch  3/11/18 09:00


    3/16/18 06:05


 


 


 Morphine Sulfate


  (Oramorph Sr)  60 mg  Q8H


 PO


   3/13/18 10:00


    3/16/18 09:07


 


 


 Lactulose


  (Lactulose Liq)  30 ml  DAILY


 PO


   3/13/18 19:00


    3/16/18 07:38


 


 


 Hydromorphone HCl


  (Dilaudid Pf Inj)  1 mg  Q6H  PRN


 IV PUSH


 BREAKTHROUGH PAIN  3/15/18 10:00


    3/16/18 06:01


 


 


 Alprazolam


  (Xanax)  0.25 mg  Q6H  PRN


 PO


 anxiety  3/15/18 10:30


    3/16/18 07:39


 


 


 Oxycodone HCl


  (Roxicodone)  10 mg  Q4H  PRN


 PO


 pain6-10  3/15/18 11:00


    3/16/18 09:07


 


 


 Pantoprazole


 Sodium


  (Protonix)  40 mg  DAILY


 PO


   3/16/18 09:00


    3/16/18 07:39


 


 


 Senna/Docusate


 Sodium


  (Thelma-Colace)  2 tab  BID


 PO


   3/15/18 21:00


    3/16/18 07:39


 


 


 Dexamethasone


  (Decadron)  2 mg  Q12HR


 PO


   3/15/18 21:00


    3/16/18 07:39


 








Objective Remarks


GENERAL: Pleasant female, lying in bed, appears comfortable. 


SKIN: Warm and dry.


HEAD: Normocephalic.


EYES: No injection or drainage. 


NECK: Supple, trachea midline.


CARDIOVASCULAR: Regular rate and rhythm


RESPIRATORY: Breath sounds equal bilaterally. No accessory muscle use.


GASTROINTESTINAL: Abdomen soft, non-tender, nondistended. 


EXTREMITIES: No cyanosis


NEUROLOGICAL: awake, alert. no focal deficit.





Assessment/Plan


Problem List:  


(1) Hodgkin lymphoma


ICD Codes:  C81.90 - Hodgkin lymphoma, unspecified, unspecified site


Status:  Acute


Plan:  3/16/18: continue Oramorph + oxycodone for breakthrough. clear for 

discharge when pain controlled. 





3/15/18: s/p ABVD yesterday.  discussed changing pain medications to PO and 

addressing constipation.  discussed that she can go home when her pain is 

controlled. 





3/14/18 BM biopsy results still pending.


Proceed with C1D1 ABVD.


Discussed risks and benefits, no more questions at present.





3/13/18: GYN oncology consult, bone marrow biopsy today. 





--HIV negative





--Pulmonary function test:


VOLUMES


DYNAMIC:   FVC and FEV1 normal.


STATIC:   RV and FRC mildly increased; TLC normal.


FLOWS:   FEV1% normal; FEF 25-75 severely reduced.


DIFFUSION:   Low normal.





IMPRESSION:    


--Very mild obstructive ventilatory defect in the terminal airways with mild 

hyperinflation. There is improvement post-bronchodilator. 


--Echocardiogram shows EF of 65-70%





HPI: 4 months ago in December she had developed pneumonia-like symptoms with 

cough.  +running fever.  goes up to 103. ++drenching night sweats. lost 20 

pounds in the last 4 months. complaining of back pain and severe bone pain.  

CAT of the chest, abdomen and pelvis was done which showed lymphadenopathy, 

multiple lesions in the lung, spleen and liver. biopsy of the liver mass and 

also right inguinal lymph node=classical Hodgkin's lymphoma. 


--Dom consulted ([facility]) for possible palliative xrt to painful back lesions

: chemotherapy regimen and radiation treatment cannot be delivered at the same 

time.  If her pain is not improving, consider delivering radiation treatment 

perhaps just prior to her next chemotherapy--could try to limit her radiation 

treatment to 5 fractions.  do not believe she is a candidate for single 

fraction treatment.





(2) Insomnia


ICD Codes:  G47.00 - Insomnia, unspecified


Plan:  --on xanax PO PRN





Assessment


37 y/o female admitted with severe back pain with a new diagnosis of classical 

Hodgkin's lymphoma, high IPS.


Plan


1. continue Oramorph + oxycodone


2. continue bowel regimen.  may need to increase lactulose today


3. d/c when pain controlled.


Attending Statement


The exam, history, and the medical decision-making described in the above note 

were completed with the assistance of the mid-level provider. I reviewed and 

agree with the findings presented.  I attest that I had a face-to-face 

encounter with the patient on the same day, and personally performed and 

documented my assessment and findings in the medical record.





Chronically ill appearing, thin cachectic, room is dark blinds pulls closed in 

AM.


c/o persistent pain.


needs pain medication.


Comfortably situation in bed with amenities at arms reach.


Discussed plan to DC home to continue treatment as out patient.


Discussed bone marrow biopsy preliminary results with pathology, BM abnormal 

but no HD involvement.


Many no specific findings.





Problem Qualifiers





(1) Hodgkin lymphoma:  








Ira Alston Mar 16, 2018 09:25


Hyun Hemphill MD Mar 16, 2018 18:15

## 2018-03-16 NOTE — RADRPT
EXAM DATE/TIME:  03/16/2018 12:46 

 

[facility] COMPARISON:     

ABDOMEN KUB ONLY, March 14, 2018, 9:22.

 

                     

INDICATIONS :     

Evaluate for constipaton

                     

 

MEDICAL HISTORY :            

Hodgkins lymphoma   

 

SURGICAL HISTORY :        

Ovarian cyst removal

 

ENCOUNTER:     

Subsequent                                        

 

ACUITY:     

1 week      

 

PAIN SCORE:     

2/10

 

LOCATION:       

Abdomen.

 

FINDINGS:     

The examination demonstrates a moderate amount of stool in the descending colon and rectum suggesting
 constipation. The bowel gas pattern is otherwise unremarkable in appearance.

 

No organomegaly is identified. No abnormal calcifications are evident. The osseous structures are int
act the

 

CONCLUSION:     

1. Probable constipation.

 

 

 

 Almas Mullen MD on March 16, 2018 at 13:13           

Board Certified Radiologist.

 This report was verified electronically.

## 2018-03-17 VITALS
RESPIRATION RATE: 22 BRPM | OXYGEN SATURATION: 98 % | DIASTOLIC BLOOD PRESSURE: 66 MMHG | TEMPERATURE: 98.2 F | HEART RATE: 98 BPM | SYSTOLIC BLOOD PRESSURE: 101 MMHG

## 2018-03-17 VITALS
TEMPERATURE: 98.3 F | SYSTOLIC BLOOD PRESSURE: 106 MMHG | HEART RATE: 60 BPM | OXYGEN SATURATION: 100 % | DIASTOLIC BLOOD PRESSURE: 66 MMHG | RESPIRATION RATE: 16 BRPM

## 2018-03-17 VITALS
TEMPERATURE: 97.7 F | DIASTOLIC BLOOD PRESSURE: 80 MMHG | HEART RATE: 69 BPM | SYSTOLIC BLOOD PRESSURE: 117 MMHG | OXYGEN SATURATION: 100 % | RESPIRATION RATE: 18 BRPM

## 2018-03-17 VITALS
SYSTOLIC BLOOD PRESSURE: 112 MMHG | RESPIRATION RATE: 16 BRPM | OXYGEN SATURATION: 95 % | DIASTOLIC BLOOD PRESSURE: 62 MMHG | HEART RATE: 71 BPM | TEMPERATURE: 97 F

## 2018-03-17 VITALS
DIASTOLIC BLOOD PRESSURE: 76 MMHG | RESPIRATION RATE: 18 BRPM | SYSTOLIC BLOOD PRESSURE: 108 MMHG | HEART RATE: 72 BPM | TEMPERATURE: 98.4 F | OXYGEN SATURATION: 98 %

## 2018-03-17 VITALS
HEART RATE: 72 BPM | RESPIRATION RATE: 16 BRPM | DIASTOLIC BLOOD PRESSURE: 72 MMHG | TEMPERATURE: 96.9 F | OXYGEN SATURATION: 100 % | SYSTOLIC BLOOD PRESSURE: 116 MMHG

## 2018-03-17 RX ADMIN — CLONIDINE HYDROCHLORIDE SCH MG: 0.1 INJECTION, SOLUTION EPIDURAL at 18:58

## 2018-03-17 RX ADMIN — STANDARDIZED SENNA CONCENTRATE AND DOCUSATE SODIUM SCH TAB: 8.6; 5 TABLET, FILM COATED ORAL at 20:36

## 2018-03-17 RX ADMIN — GABAPENTIN SCH MG: 300 CAPSULE ORAL at 14:17

## 2018-03-17 RX ADMIN — GABAPENTIN SCH MG: 300 CAPSULE ORAL at 06:07

## 2018-03-17 RX ADMIN — MORPHINE SULFATE SCH MG: 30 TABLET, EXTENDED RELEASE ORAL at 18:58

## 2018-03-17 RX ADMIN — DEXAMETHASONE SCH MG: 0.5 TABLET ORAL at 20:36

## 2018-03-17 RX ADMIN — Medication SCH ML: at 20:37

## 2018-03-17 RX ADMIN — PANTOPRAZOLE SCH MG: 40 TABLET, DELAYED RELEASE ORAL at 08:27

## 2018-03-17 RX ADMIN — HYDROMORPHONE HYDROCHLORIDE PRN MG: 2 INJECTION INTRAMUSCULAR; INTRAVENOUS; SUBCUTANEOUS at 21:47

## 2018-03-17 RX ADMIN — DEXAMETHASONE SCH MG: 0.5 TABLET ORAL at 08:27

## 2018-03-17 RX ADMIN — WATER SCH ML: 1 IRRIGANT IRRIGATION at 08:27

## 2018-03-17 RX ADMIN — MORPHINE SULFATE SCH MG: 30 TABLET, EXTENDED RELEASE ORAL at 00:35

## 2018-03-17 RX ADMIN — HYDROXYZINE HYDROCHLORIDE SCH MG: 50 TABLET, FILM COATED ORAL at 20:35

## 2018-03-17 RX ADMIN — Medication SCH ML: at 08:29

## 2018-03-17 RX ADMIN — HYDROMORPHONE HYDROCHLORIDE PRN MG: 2 INJECTION INTRAMUSCULAR; INTRAVENOUS; SUBCUTANEOUS at 14:18

## 2018-03-17 RX ADMIN — GABAPENTIN SCH MG: 300 CAPSULE ORAL at 21:47

## 2018-03-17 RX ADMIN — STANDARDIZED SENNA CONCENTRATE AND DOCUSATE SODIUM SCH TAB: 8.6; 5 TABLET, FILM COATED ORAL at 08:27

## 2018-03-17 RX ADMIN — WATER SCH ML: 1 IRRIGANT IRRIGATION at 20:36

## 2018-03-17 RX ADMIN — ALLOPURINOL SCH MG: 300 TABLET ORAL at 08:27

## 2018-03-17 RX ADMIN — MORPHINE SULFATE SCH MG: 30 TABLET, EXTENDED RELEASE ORAL at 10:15

## 2018-03-17 RX ADMIN — HYDROMORPHONE HYDROCHLORIDE PRN MG: 2 INJECTION INTRAMUSCULAR; INTRAVENOUS; SUBCUTANEOUS at 00:33

## 2018-03-17 NOTE — HHI.PR
Subjective


Remarks


Pt continues to have difficulties with breakthrough pain 


and itching.





Pt had multiple bowel movements today.





Objective


Vitals





Vital Signs








  Date Time  Temp Pulse Resp B/P (MAP) Pulse Ox O2 Delivery O2 Flow Rate FiO2


 


3/17/18 16:46 98.4 72 18 108/76 (87) 98   


 


3/17/18 12:14 98.2 98 22 101/66 (78) 98   


 


3/17/18 08:24 97.7 69 18 117/80 (92) 100   


 


3/17/18 04:00 97.0 71 16 112/62 (79) 95   


 


3/17/18 00:00 96.9 72 16 116/72 (87) 100   


 


3/16/18 20:00 98.3 76 16 113/73 (86) 100   








Result Diagram:  


3/16/18 0315                                                                   

             3/16/18 0315





Imaging





Last Impressions








Abdomen X-Ray 3/14/18 0800 Signed





Impressions: 





 Service Date/Time:  Wednesday, March 14, 2018 09:22 - CONCLUSION:  1. Mild 





 constipation. No acute findings.     Tarun Root MD 


 


Bone Biopsy CT 3/13/18 0000 Signed





Impressions: 





 Service Date/Time:  Tuesday, March 13, 2018 14:08 - CONCLUSION:  1.  





 Uncomplicated CT guided bone marrow aspirate. 2.  Uncomplicated CT guided bone 





 marrow biopsy.     Denis Solorio MD 


 


Port Line Insertion 3/12/18 0000 Signed





Impressions: 





 Service Date/Time:  Monday, March 12, 2018 13:03 - CONCLUSION:  Uncomplicated 





 ultrasound and fluoroscopic guided implanted central venous port catheter 





 placement as described in detail above.  An 8 [place] [employer] port was placed.   

  





 Artem Nicholas MD 


 


Thoracic Spine MRI 3/8/18 0000 Signed





Impressions: 





 Service Date/Time:  Thursday, March 8, 2018 17:55 - CONCLUSION:  Signal 





 abnormalities within the T3 and T4 vertebral body and right T4 pedicle with 





 contrast enhancement.  There is also T1 prolongation without contrast 





 enhancement in the T8 vertebral body.  The appearance is nonspecific but is 





 abnormal and suggests possible metastatic disease.     Roby Matos MD 


 


Sacrum/Coccyx MRI 3/8/18 0000 Signed





Impressions: 





 Service Date/Time:  Thursday, March 8, 2018 17:55 - CONCLUSION:  There are 





 signal abnormalities in the superior S2 and bilateral sacroiliac with T2 





 prolongation and contrast enhancement.  This suggests the possibility of 





 metastatic lesions.     Roby Matos MD 


 


Lumbar Spine MRI 3/8/18 0000 Signed





Impressions: 





 Service Date/Time:  Thursday, March 8, 2018 17:55 - CONCLUSION:  1. No 

evidence 





 of disc bulge or protrusion. 2. Triangular shaped area of T2 prolongation in 

the 





 anterior inferior L1 vertebral body with correlating enhancement on the 





 postcontrast study.  This of uncertain significance, but would be an unusual 





 shape and location for a metastatic lesion.     Roby Matos MD 


 


Hip and Pelvis X-Ray 3/7/18 1854 Signed





Impressions: 





 Service Date/Time:  Wednesday, March 7, 2018 19:11 - CONCLUSION:  Negative 





 evaluation of the left hip.     Roby Matos MD 








Objective Remarks


General: NAD, AAOx3


Chest: CTA


Cardiac: Regular


Abd: soft, NT, ND, +BS x 4


Ext: No edema





A/P


Problem List:  


(1) Hodgkin lymphoma


ICD Codes:  C81.90 - Hodgkin lymphoma, unspecified, unspecified site


Status:  Acute


Plan:  - Pt had reported unintentional weight loss ~20lbs since 1/2018, night 

sweats and had outpt CT Chest/Abd/Pelvis on 3/2/18


- Outpt Chest CT W/W/O IV contrast (3/2/18):


   - Innumerable masses scattered throughout the lungs. Numerous lymph nodes 

throughout the mediastinum and both hilar regions





- Outpt CT Abd/pelvis W/W/O contrast (3/2/18):


   - There is widespread metastatic disease throughout the spleen, the liver 

and the retroperitoneum. Lymphoma most likely diagnosis


    versus conceivably melanoma or breast cancer. 


   - Indeterminate sclerotic lesion in T12. 


   - Liver with 2.7cm low-density lesion in the lateral segment of the left 

lobe of the liver. At least 5 other low-density lesions scattered


    throughout the liver concerning for metastatic disease. 


   - Spleen is markedly heterogenous appearance with multiple masses. 


   - Retroperitoneum with 2.2cm left periaortic lymph node below the left renal 

vein. At least 3 other retroperitoneal lymph nodes are present. 


   - 3.0 x 1.6cm right inguinal lymph node. Some internal obturator 

lymphadenopathy on the right as well. 





- Pt underwent outpt CT guided liver biopsy and US guided LN biopsy on 3/7/18.


- AFP, CA-19-9, CEA, Ca 125 are WNL


- TSH, Free T4 are WNL





- Pt has been having increased pain in her tailbone and back more recently. 


- Thoracic Spine MRI (3/8/18) --> Signal abnormalities within the T3 and T4 

vertebral body and right T4 pedicle with contrast enhancement.  There is 


 also T1 prolongation without contrast enhancement in the T8 vertebral body.  

The appearance is nonspecific but is abnormal and suggests possible


 metastatic disease. 


- Lumbar Spine MRI (3/8/18) --> No evidence of disc bulge or protrusion. 

Triangular shaped area of T2 prolongation in the anterior inferior L1 vertebral 


 body with correlating enhancement on the postcontrast study.  This of 

uncertain significance, but would be an unusual shape and location for a


 metastatic lesion.  


- Sacral spine MRI (3/8/18) --> There are signal abnormalities in the superior 

S2 and bilateral sacroiliac with T2 prolongation and contrast enhancement.


  This suggests the possibility of metastatic lesions.





- Pathology from lymph nodes shows classic Hodgkin lymphoma. liver bx 

nondiagnostic








- Decadron per oncology


- Increased Neurontin to 600mg TID started on 3/16


- 2D echo (3/10/18):


   - LV systolic function hyperdynamic with estimated EF 65-70%


   - Trace to mild tricuspid valve regurgitation


   - Estimated PA pressure 37.2mmHg


- Pt had PFT with DLCO on 3/12


- Dbzass-h-Djqw placement on 3/12


- BM biopsy on 3/13, pathology pending. 


- Pt received chemotherapy (3/14/18)


- Gabapentin 600mg TID


-  Oramorph 90mg TID (increased to this dose 3/17)


- oxycodone increased 10-15 mg  prn & dilaudid q6h prn breakthrough pain


- case d/w nursing.  Try to to utilize PO medication only. 


- increased scheduled atrax to 50mg q6h


- Xanax prn anxiety


- will need adequate control of symptoms including pain, anxiety, and itching 

prior to discharge


- anticipate d/c to home with C and home PT in next 1-2 days





(2) Symptomatic anemia


ICD Codes:  D64.9 - Anemia, unspecified


Status:  Acute


Plan:  


- Pt is a 39 y/o female who was sent to the ED by her PCP for worsening anemia 

and leukocytosis on outpt labs. 


- Pt reported various complaints at the time of admission, including weight 

loss (Approx 20lbs in the last 3 months), intermittent dizziness, 


 fevers, "bone pain", night sweats. 


- Pts outpt H/H has decreased to Hgb 7.4/Hct 22.8, MCV 77, MCH 25 on 3/6/18 and 

was recommended to report to the ED for further eval. 


- Pt had previously reported intermittent black stools and was noted to be 

Hemoccult positive in the ED. 


- She was seen by GI in 2/2017 and had an EGD with dilation for complaints of 

dysphagia and odynophagia. The EGD noted gastritis/superficial 


 ulceration of the antrum, esophagitis in the distal esophagus, and food bolus 

in the stomach. 


- Pt was planned for outpt evaluation with colonoscopy which had not been 

performed yet. 


- Pt has family hx of colon cancer, father. 


- She has received 2 units of PRBCs at admission with H/H improving and stable


- GI following


- Pt had EGD on 3/9/18 which was negative. 


- Pt refused the colonoscopy 


- Monitor H/H 


- PPI


- Supportive care





(3) Fever


ICD Codes:  R50.9 - Fever, unspecified


Status:  Resolved


Plan:  


- Pt has had reported night sweats and was noted to have fevers prior to 

admission


- Blood cultures taken in the ED with NGTD


- UA was negative


- Felt to most likely be related to tumor fever or related to malignancy, no 

obvious signs of infection


- Tylenol PRN





(4) Weight loss, unintentional


ICD Codes:  R63.4 - Abnormal weight loss


Status:  Acute


Plan:  


- See above.





(5) Back pain


ICD Codes:  M54.9 - Dorsalgia, unspecified


Status:  Acute


Plan:  


- Pt has been having increased pain in her tailbone and back more recently 

likely related to bone mets. 


- See above





(6) Liver mass


ICD Codes:  R16.0 - Hepatomegaly, not elsewhere classified


Plan:  


- Outpt biopsy was nondiagnostic





(7) Constipation


ICD Codes:  K59.00 - Constipation, unspecified


Plan:  Patient on Thelma-colace 2 tablets BID


increase lactulose to BID


add Fleets enema


KUB requested


Patient had one dose of relistor 3/15


plan to give additional Relistor after review of KUB





Assessment and Plan


Patient examined.


Assessment and plan formulated with Elva Means PA-C.


I agree with the above.





Pt quite painful this morning. Pt can NOT be discharged to home until 

controlled with PO pain medication regimen. 


Poor bowel sounds today. Obtain KUB. If no evidence of obstruction, will give 

Relistor. 


scheduled Oramorph


gabapentin increased


prn oxycodone also increased. 


continue BID pericolace


increase lactulose to BID





Problem Qualifiers





(1) Hodgkin lymphoma:  








Travon Cuadra DO Mar 17, 2018 16:57

## 2018-03-17 NOTE — PD.CONS
History of Present Illness


Service


[facility]


Consult Requested By


Medical service


Reason for Consult


Fertility preservation and contraception recommendations.


Primary Care Physician


Diana Carbajal M.D.


Diagnoses:  


History of Present Illness


This is a 38-year-old   female who was admitted on 3/7 for 

fever and multiple other complaints, was subsequently diagnosed with high IPS 

Hodgkins lymphoma, is status post cycle #1 of ABVD on 3/14, from what she tells 

me there is no plans at this time to undergo radiation. I was consulted to 

discuss contraception and future fertility, patient upon record reviews was 

already counseled on potential of gonadal toxicity of chemo and/or radiation 

and embyro / oophocyte cryopreservation which patient declined ultimately 

desiring to start her chemo as soon as possible. 





Patient states she has "single", but in a relationship with the same male 

partner for 6 years, they have never attempted a pregnancy, he has never 

fathered a child.  She has been using combined oral contraception for several 

years due to history of endometriosis, most recently on Seasonique and 

discontinued in 2017 because she wanted a cycle monthly, her cycles 

were "barely there" on the seasonique, she just finished a pack of Apri which 

is a RUMA, she has not started her cycle yet.





Review of Systems


Except as stated in HPI:  all other systems reviewed are Neg





Past Family Social History


Allergies:  


Coded Allergies:  


     No Known Allergies (Verified  Adverse Reaction, Unknown, 1/3/18)


Past Medical History


1.  Depression.


2.  Anxiety.


3.  Gastroesophageal reflux.


4.  Endometriosis? 


5.  Anemia


6.  Hodgkin Lymphoma


Past Surgical History


1. : Laparoscopic bilateral ovarian cystectomy, she states they were "benign

", unsure if there endometriomas or extent of the surgery.  Believe she still 

has 2 remaining ovaries.


2. Esophageal dilation


3. : LEEP, states was "benign"


Reported Medications


See in patient MAR / Out pt med rec.


Family History


- Father: colon cancer. 


- Mother is alive and well.  


- One brother  in a MVC.   


- She has had no children.  


-Denies any known hereditary or history of GYN or breast cancers.


Social History


- no current T/E/D use, She occasionally smoked years ago.  


- She is currently unemployed.





Gyn Hx: 


STDs: Denies


Pap hx: States around  had a LEEP procedure that was "benign" normal Paps 

since then.





OB Hx:  


-Age 30,EAB, first trimester D&C





Physical Exam


Vital Signs





Vital Signs








  Date Time  Temp Pulse Resp B/P (MAP) Pulse Ox O2 Delivery O2 Flow Rate FiO2


 


3/17/18 21:30   16     


 


3/17/18 20:33 98.3 60 16 106/66 (79) 100   


 


3/17/18 19:58   16     


 


3/17/18 19:58   16     


 


3/17/18 16:46 98.4 72 18 108/76 (87) 98   


 


3/17/18 12:14 98.2 98 22 101/66 (78) 98   


 


3/17/18 08:24 97.7 69 18 117/80 (92) 100   


 


3/17/18 04:00 97.0 71 16 112/62 (79) 95   


 


3/17/18 00:00 96.9 72 16 116/72 (87) 100   








Physical Exam


GENERAL: This is a well-developed patient, in no apparent distress.


SKIN: No rashes, ecchymoses or lesions. 


HEAD: Atraumatic. Normocephalic. No temporal or scalp tenderness.


EYES: Pupils equal round and reactive. Extraocular motions intact. No scleral 

icterus. No injection or drainage. 


CARDIOVASCULAR: Regular rate. 


RESPIRATORY: Non labored respirations. 


GASTROINTESTINAL: deferred


: Deferred


MUSCULOSKELETAL: Extremities without clubbing, cyanosis, or edema. No joint 

tenderness, effusion, or edema noted. No calf tenderness. Negative Homans sign 

bilaterally.


NEUROLOGICAL: Awake and alert. Cranial nerves II through XII intact.  Motor and 

sensory grossly within normal limits. Normal speech.


Laboratory











 Date/Time


Source Procedure


Growth Status


 


 


 3/8/18 06:40


Blood Other Aerobic Blood Culture - Final


NO GROWTH IN 5 DAYS Complete


 


 3/8/18 06:40


Blood Other Anaerobic Blood Culture - Final


NO GROWTH IN 5 DAYS Complete








Result Diagram:  


3/16/18 0315                                                                   

             3/16/18 0315








Assessment and Plan


Assessment and Plan


1. Contraception: Discussed with her that given her age and current treatment 

with chemotherapy she may have been put into ovarian insufficiency or failure, 

she may not have a cycle anytime soon or possibly again.  However given her 

anemia and potential to avoid any vaginal bleeding that could exacerbate this 

and also the benefit of avoiding an unwanted pregnancy during her treatment we 

discussed options of birth control.  Ultimately decided that progesterone only 

pills would suit her best at this time, discussed risks, benefits and side 

effects. Mad her aware that avoiding estrogen would be beneficial given her 

clinical picture as it is associated with an increased risk of VTE. 


- Began PO progesterone while inpatient and eRx pills with 11 refills to her 

pharmacy for after discharge. 


- Provided pt my name and office contact info if she desires to follow up or 

talk further after discharge. 





2. Fertility counseling: Discussed that components of her chemo regimen are 

consider to be gonadotoxic and based on age and current chemo, reproductive 

endocrinologist ([email or web address]) may note even pursue egg retrieval given low success 

rates and would probably be better candidate for donor eggs, surrogate carrier 

or adoption, she is aware of this and her expectations are realistic. Made her 

aware that ovarian suppression with GNRH agonist or contraceptive is only 

experimental and there is no consensus on improving rates of fertility or 

response of ovulation induction after chemo and/or radiation. Will come by 

tomorrow to give her contact info for [facility] in the area. 





** thank you for the consult and if you have any further questions please 

contact me [phone]. I have signed off on this patient. **











Ryan Arroyo MD Mar 17, 2018 22:22

## 2018-03-17 NOTE — PD.ONC.PN
Subjective


Subjective


Remarks


Afebrile overnight. 


Patient resting in bed in nad. 


states she feels her pain is better controlled. 


however she is still requiring IV dilaudid for 10/10 pain. 


still has anxiety and itching. 


the pain is both "all over" generalized pain, as well as at times localized to 

her port site (collar bone) and back.





Objective


Data











  Date Time  Temp Pulse Resp B/P (MAP) Pulse Ox O2 Delivery O2 Flow Rate FiO2


 


3/17/18 04:00 97.0 71 16 112/62 (79) 95   


 


3/17/18 00:00 96.9 72 16 116/72 (87) 100   


 


3/16/18 20:00 98.3 76 16 113/73 (86) 100   


 


3/16/18 16:00 97.9 85 18 119/75 (90) 99   


 


3/16/18 12:00 97.5 60 18 110/68 (82) 99   














 3/17/18 3/17/18 3/17/18





 07:00 15:00 23:00


 


Intake Total 1900 ml  


 


Output Total 1500 ml  


 


Balance 400 ml  








Result Diagram:  


3/16/18 0315                                                                   

             3/16/18 0315





Imaging Studies





Last 24 hours Impressions








Abdomen X-Ray 3/16/18 1238 Signed





Impressions: 





 Service Date/Time:  Friday, March 16, 2018 12:46 - CONCLUSION:  1. Probable 





 constipation.     Almas Mullen MD 











Administered Medications








 Medications


  (Trade)  Dose


 Ordered  Sig/Nohemi


 Route


 PRN Reason  Start Time


 Stop Time Status Last Admin


Dose Admin


 


 Sodium Chloride


  (NS Flush)  2 ml  BID


 IV FLUSH


   3/8/18 09:00


    3/17/18 08:29


 


 


 Acetaminophen


  (Tylenol)  650 mg  Q4H  PRN


 PO


 TEMP > 100.4  3/7/18 21:30


    3/10/18 23:25


 


 


 Magnesium


 Hydroxide


  (Milk Of


 Magnesia Liq)  30 ml  Q12H  PRN


 PO


 Mild constipation  3/7/18 21:30


    3/14/18 05:38


 


 


 Allopurinol


  (Zyloprim)  300 mg  DAILY


 PO


   3/11/18 09:00


    3/17/18 08:27


 


 


 Hydroxyzine HCl


  (Atarax)  25 mg  Q6H


 PO


   3/11/18 09:00


    3/17/18 08:27


 


 


 Hydroxyzine HCl


  (Atarax)  25 mg  Q6H  PRN


 PO


 breakthrough itch  3/11/18 09:00


    3/17/18 00:38


 


 


 Morphine Sulfate


  (Oramorph Sr)  60 mg  Q8H


 PO


   3/13/18 10:00


    3/17/18 00:35


 


 


 Hydromorphone HCl


  (Dilaudid Pf Inj)  1 mg  Q6H  PRN


 IV PUSH


 BREAKTHROUGH PAIN  3/15/18 10:00


    3/17/18 00:33


 


 


 Alprazolam


  (Xanax)  0.25 mg  Q6H  PRN


 PO


 anxiety  3/15/18 10:30


    3/17/18 08:27


 


 


 Oxycodone HCl


  (Roxicodone)  10 mg  Q4H  PRN


 PO


 pain 1-5  3/15/18 11:00


    3/16/18 09:07


 


 


 Pantoprazole


 Sodium


  (Protonix)  40 mg  DAILY


 PO


   3/16/18 09:00


    3/17/18 08:27


 


 


 Senna/Docusate


 Sodium


  (Thelma-Colace)  2 tab  BID


 PO


   3/15/18 21:00


    3/17/18 08:27


 


 


 Gabapentin


  (Neurontin)  600 mg  Q8HR


 PO


   3/16/18 14:00


    3/17/18 06:07


 


 


 Lactulose


  (Lactulose Liq)  30 ml  BID


 PO


   3/16/18 21:00


    3/17/18 08:27


 


 


 Oxycodone HCl


  (Roxicodone)  15 mg  Q4H  PRN


 PO


 PAIN SCALE 6 TO 10  3/16/18 11:00


    3/17/18 06:08


 


 


 Dexamethasone


  (Decadron)  1 mg  Q12HR


 PO


   3/17/18 09:00


    3/17/18 08:27


 








Objective Remarks


GENERAL: Anxious female, sitting up in bed, itching.


SKIN: Warm and dry.


HEAD: Normocephalic.


EYES: No injection or drainage. 


NECK: Supple, trachea midline.


CARDIOVASCULAR: Regular rate and rhythm


RESPIRATORY: Breath sounds equal bilaterally. No accessory muscle use.


GASTROINTESTINAL: Abdomen soft, non-tender, nondistended. 


EXTREMITIES: No cyanosis


NEUROLOGICAL: no focal deficit.





Assessment/Plan


Assessment


39 y/o female with newly diagnosed classical Hodgkin's lymphoma, high IPS. s/p 

C1 ABVD 3/14


Plan


1. increase Oramorph to 90mg PO q 8 hours


2. continue bowel regimen thelma-colace BID + lactulose BID


3. okay to d/c when pain controlled.


Attending Statement


The exam, history, and the medical decision-making described in the above note 

were completed with the assistance of the mid-level provider. I reviewed and 

agree with the findings presented.  I attest that I had a face-to-face 

encounter with the patient on the same day, and personally performed and 

documented my assessment and findings in the medical record.


uncomfortable with discomfort in the chest bilaterally without sob or 

tachycardia.   will try toradol  for a day and see if it helps. her lungs are 

clear and heart reg rhythm and not rapid.  if worsening problems will check ct 

of chest.











Ira Alston Mar 17, 2018 09:16


Pedro Workman MD Mar 17, 2018 14:11

## 2018-03-18 VITALS
OXYGEN SATURATION: 100 % | RESPIRATION RATE: 18 BRPM | HEART RATE: 63 BPM | DIASTOLIC BLOOD PRESSURE: 59 MMHG | SYSTOLIC BLOOD PRESSURE: 102 MMHG | TEMPERATURE: 98.3 F

## 2018-03-18 VITALS
HEART RATE: 75 BPM | OXYGEN SATURATION: 97 % | RESPIRATION RATE: 16 BRPM | DIASTOLIC BLOOD PRESSURE: 67 MMHG | SYSTOLIC BLOOD PRESSURE: 114 MMHG

## 2018-03-18 VITALS
OXYGEN SATURATION: 100 % | HEART RATE: 69 BPM | TEMPERATURE: 97 F | DIASTOLIC BLOOD PRESSURE: 76 MMHG | SYSTOLIC BLOOD PRESSURE: 121 MMHG | RESPIRATION RATE: 16 BRPM

## 2018-03-18 VITALS
RESPIRATION RATE: 18 BRPM | SYSTOLIC BLOOD PRESSURE: 101 MMHG | HEART RATE: 64 BPM | OXYGEN SATURATION: 98 % | DIASTOLIC BLOOD PRESSURE: 68 MMHG | TEMPERATURE: 97.7 F

## 2018-03-18 VITALS
OXYGEN SATURATION: 97 % | DIASTOLIC BLOOD PRESSURE: 69 MMHG | SYSTOLIC BLOOD PRESSURE: 109 MMHG | RESPIRATION RATE: 18 BRPM | TEMPERATURE: 97.2 F | HEART RATE: 64 BPM

## 2018-03-18 VITALS
OXYGEN SATURATION: 99 % | RESPIRATION RATE: 16 BRPM | HEART RATE: 63 BPM | DIASTOLIC BLOOD PRESSURE: 55 MMHG | SYSTOLIC BLOOD PRESSURE: 96 MMHG | TEMPERATURE: 98.3 F

## 2018-03-18 LAB
BASOPHILS # BLD AUTO: 0 TH/MM3 (ref 0–0.2)
BASOPHILS NFR BLD: 0.1 % (ref 0–2)
BUN SERPL-MCNC: 16 MG/DL (ref 7–18)
CALCIUM SERPL-MCNC: 8.5 MG/DL (ref 8.5–10.1)
CHLORIDE SERPL-SCNC: 103 MEQ/L (ref 98–107)
CREAT SERPL-MCNC: 0.63 MG/DL (ref 0.5–1)
EOSINOPHIL # BLD: 0.1 TH/MM3 (ref 0–0.4)
EOSINOPHIL NFR BLD: 0.7 % (ref 0–4)
ERYTHROCYTE [DISTWIDTH] IN BLOOD BY AUTOMATED COUNT: 19.4 % (ref 11.6–17.2)
GFR SERPLBLD BASED ON 1.73 SQ M-ARVRAT: 105 ML/MIN (ref 89–?)
GLUCOSE SERPL-MCNC: 81 MG/DL (ref 74–106)
HCO3 BLD-SCNC: 32.2 MEQ/L (ref 21–32)
HCT VFR BLD CALC: 31.2 % (ref 35–46)
HGB BLD-MCNC: 10.1 GM/DL (ref 11.6–15.3)
LYMPHOCYTES # BLD AUTO: 0.6 TH/MM3 (ref 1–4.8)
LYMPHOCYTES NFR BLD AUTO: 7 % (ref 9–44)
MCH RBC QN AUTO: 25.8 PG (ref 27–34)
MCHC RBC AUTO-ENTMCNC: 32.5 % (ref 32–36)
MCV RBC AUTO: 79.3 FL (ref 80–100)
MONOCYTE #: 0.1 TH/MM3 (ref 0–0.9)
MONOCYTES NFR BLD: 1.3 % (ref 0–8)
NEUTROPHILS # BLD AUTO: 8.2 TH/MM3 (ref 1.8–7.7)
NEUTROPHILS NFR BLD AUTO: 90.9 % (ref 16–70)
PLATELET # BLD: 593 TH/MM3 (ref 150–450)
PMV BLD AUTO: 7.2 FL (ref 7–11)
RBC # BLD AUTO: 3.93 MIL/MM3 (ref 4–5.3)
SODIUM SERPL-SCNC: 140 MEQ/L (ref 136–145)
WBC # BLD AUTO: 9 TH/MM3 (ref 4–11)

## 2018-03-18 RX ADMIN — PANTOPRAZOLE SCH MG: 40 TABLET, DELAYED RELEASE ORAL at 08:48

## 2018-03-18 RX ADMIN — HYDROXYZINE HYDROCHLORIDE SCH MG: 50 TABLET, FILM COATED ORAL at 22:04

## 2018-03-18 RX ADMIN — GABAPENTIN SCH MG: 300 CAPSULE ORAL at 15:24

## 2018-03-18 RX ADMIN — DEXAMETHASONE SCH MG: 0.5 TABLET ORAL at 08:49

## 2018-03-18 RX ADMIN — STANDARDIZED SENNA CONCENTRATE AND DOCUSATE SODIUM SCH TAB: 8.6; 5 TABLET, FILM COATED ORAL at 22:03

## 2018-03-18 RX ADMIN — CLONIDINE HYDROCHLORIDE SCH MG: 0.1 INJECTION, SOLUTION EPIDURAL at 18:16

## 2018-03-18 RX ADMIN — HYDROMORPHONE HYDROCHLORIDE PRN MG: 2 INJECTION INTRAMUSCULAR; INTRAVENOUS; SUBCUTANEOUS at 04:08

## 2018-03-18 RX ADMIN — HYDROMORPHONE HYDROCHLORIDE PRN MG: 2 INJECTION INTRAMUSCULAR; INTRAVENOUS; SUBCUTANEOUS at 12:07

## 2018-03-18 RX ADMIN — HYDROMORPHONE HYDROCHLORIDE PRN MG: 2 INJECTION INTRAMUSCULAR; INTRAVENOUS; SUBCUTANEOUS at 18:17

## 2018-03-18 RX ADMIN — Medication SCH ML: at 22:04

## 2018-03-18 RX ADMIN — HYDROXYZINE HYDROCHLORIDE SCH MG: 50 TABLET, FILM COATED ORAL at 15:24

## 2018-03-18 RX ADMIN — CLONIDINE HYDROCHLORIDE SCH MG: 0.1 INJECTION, SOLUTION EPIDURAL at 05:38

## 2018-03-18 RX ADMIN — Medication SCH ML: at 08:58

## 2018-03-18 RX ADMIN — STANDARDIZED SENNA CONCENTRATE AND DOCUSATE SODIUM SCH TAB: 8.6; 5 TABLET, FILM COATED ORAL at 08:48

## 2018-03-18 RX ADMIN — ALLOPURINOL SCH MG: 300 TABLET ORAL at 08:49

## 2018-03-18 RX ADMIN — CLONIDINE HYDROCHLORIDE SCH MG: 0.1 INJECTION, SOLUTION EPIDURAL at 22:04

## 2018-03-18 RX ADMIN — HYDROXYZINE HYDROCHLORIDE SCH MG: 50 TABLET, FILM COATED ORAL at 02:22

## 2018-03-18 RX ADMIN — WATER SCH ML: 1 IRRIGANT IRRIGATION at 08:48

## 2018-03-18 RX ADMIN — HYDROXYZINE HYDROCHLORIDE SCH MG: 50 TABLET, FILM COATED ORAL at 08:49

## 2018-03-18 RX ADMIN — GABAPENTIN SCH MG: 300 CAPSULE ORAL at 22:03

## 2018-03-18 RX ADMIN — MORPHINE SULFATE SCH MG: 30 TABLET, EXTENDED RELEASE ORAL at 02:21

## 2018-03-18 RX ADMIN — CLONIDINE HYDROCHLORIDE SCH MG: 0.1 INJECTION, SOLUTION EPIDURAL at 00:54

## 2018-03-18 RX ADMIN — MORPHINE SULFATE SCH MG: 30 TABLET, EXTENDED RELEASE ORAL at 19:34

## 2018-03-18 RX ADMIN — CLONIDINE HYDROCHLORIDE SCH MG: 0.1 INJECTION, SOLUTION EPIDURAL at 12:06

## 2018-03-18 RX ADMIN — MORPHINE SULFATE SCH MG: 30 TABLET, EXTENDED RELEASE ORAL at 10:30

## 2018-03-18 RX ADMIN — GABAPENTIN SCH MG: 300 CAPSULE ORAL at 05:37

## 2018-03-18 NOTE — PD.ONC.PN
Subjective


Subjective


Remarks


Afebrile overnight. 


Patient resting in bed in nad. 


She states pain is better controlled today. 


anxiety better controlled with xanax.





Objective


Data











  Date Time  Temp Pulse Resp B/P (MAP) Pulse Ox O2 Delivery O2 Flow Rate FiO2


 


3/18/18 09:01 97.7 64 18 101/68 (79) 98   


 


3/18/18 06:38   16     


 


3/18/18 05:34 98.3 63 16 96/55 (69) 99   


 


3/18/18 04:35   16     


 


3/18/18 03:20   16     


 


3/18/18 00:50 98.3 63 18 102/59 (73) 100   


 


3/17/18 21:30   16     


 


3/17/18 20:33 98.3 60 16 106/66 (79) 100   


 


3/17/18 16:46 98.4 72 18 108/76 (87) 98   


 


3/17/18 12:14 98.2 98 22 101/66 (78) 98   














 3/18/18 3/18/18 3/18/18





 07:00 15:00 23:00


 


Intake Total 960 ml  


 


Output Total 1100 ml  


 


Balance -140 ml  








Result Diagram:  


3/16/18 0315                                                                   

             3/16/18 0315








Administered Medications








 Medications


  (Trade)  Dose


 Ordered  Sig/Nohemi


 Route


 PRN Reason  Start Time


 Stop Time Status Last Admin


Dose Admin


 


 Sodium Chloride


  (NS Flush)  2 ml  BID


 IV FLUSH


   3/8/18 09:00


    3/18/18 08:58


 


 


 Acetaminophen


  (Tylenol)  650 mg  Q4H  PRN


 PO


 TEMP > 100.4  3/7/18 21:30


    3/10/18 23:25


 


 


 Magnesium


 Hydroxide


  (Milk Of


 Magnesia Liq)  30 ml  Q12H  PRN


 PO


 Mild constipation  3/7/18 21:30


    3/14/18 05:38


 


 


 Allopurinol


  (Zyloprim)  300 mg  DAILY


 PO


   3/11/18 09:00


    3/18/18 08:49


 


 


 Hydroxyzine HCl


  (Atarax)  25 mg  Q6H  PRN


 PO


 breakthrough itch  3/11/18 09:00


    3/17/18 00:38


 


 


 Hydromorphone HCl


  (Dilaudid Pf Inj)  1 mg  Q6H  PRN


 IV PUSH


 BREAKTHROUGH PAIN  3/15/18 10:00


    3/18/18 04:08


 


 


 Alprazolam


  (Xanax)  0.25 mg  Q6H  PRN


 PO


 anxiety  3/15/18 10:30


    3/18/18 00:54


 


 


 Oxycodone HCl


  (Roxicodone)  10 mg  Q4H  PRN


 PO


 pain 1-5  3/15/18 11:00


    3/16/18 09:07


 


 


 Pantoprazole


 Sodium


  (Protonix)  40 mg  DAILY


 PO


   3/16/18 09:00


    3/18/18 08:48


 


 


 Senna/Docusate


 Sodium


  (Thelma-Colace)  2 tab  BID


 PO


   3/15/18 21:00


    3/18/18 08:48


 


 


 Gabapentin


  (Neurontin)  600 mg  Q8HR


 PO


   3/16/18 14:00


    3/18/18 05:37


 


 


 Lactulose


  (Lactulose Liq)  30 ml  BID


 PO


   3/16/18 21:00


    3/18/18 08:48


 


 


 Oxycodone HCl


  (Roxicodone)  15 mg  Q4H  PRN


 PO


 PAIN SCALE 6 TO 10  3/16/18 11:00


    3/18/18 08:48


 


 


 Dexamethasone


  (Decadron)  1 mg  Q12HR


 PO


   3/17/18 09:00


    3/18/18 08:49


 


 


 Morphine Sulfate


  (Oramorph Sr)  90 mg  Q8H


 PO


   3/17/18 10:00


    3/18/18 10:30


 


 


 Ketorolac


 Tromethamine


  (Toradol Inj)  30 mg  Q6HR


 IV PUSH


   3/17/18 18:00


 3/22/18 17:59  3/18/18 05:38


 


 


 Hydroxyzine HCl


  (Atarax)  50 mg  Q6H


 PO


   3/17/18 21:00


    3/18/18 08:49


 








Objective Remarks


GENERAL: Anxious female, upright in bed getting ready to take a shower


SKIN: Warm and dry.


HEAD: Normocephalic.


EYES: No injection or drainage. 


NECK: Supple, trachea midline.


CARDIOVASCULAR: Regular rate and rhythm


RESPIRATORY: Breath sounds equal bilaterally. No accessory muscle use.


GASTROINTESTINAL: Abdomen soft, non-tender, nondistended. 


EXTREMITIES: No cyanosis


NEUROLOGICAL: awake, alert. normal speech. moving extremities.





Assessment/Plan


Assessment


39 y/o female with newly diagnosed classical Hodgkin's lymphoma, high IPS. s/p 

C1 ABVD 3/14


Plan


1. continue Oramorph at 90mg PO q 8 hours with Oxycodone for breakthrough


2. continue xanax for anxiety


3. reduce decadron to once daily


4. add as needed tums for heartburn, continue protonix


Attending Statement


The exam, history, and the medical decision-making described in the above note 

were completed with the assistance of the mid-level provider. I reviewed and 

agree with the findings presented.  I attest that I had a face-to-face 

encounter with the patient on the same day, and personally performed and 

documented my assessment and findings in the medical record.


doing better and pain less.   She should be able to go home tomorrow after Dr. Hemphill sees and will require her next tx 14 days after previous tx.











Ira Alston Mar 18, 2018 11:06


Pedro Workman MD Mar 18, 2018 14:02

## 2018-03-18 NOTE — HHI.PR
Subjective


Remarks


Pt's feels that her pain and anxiety are both better controlled today.





Objective


Vitals





Vital Signs








  Date Time  Temp Pulse Resp B/P (MAP) Pulse Ox O2 Delivery O2 Flow Rate FiO2


 


3/18/18 09:01 97.7 64 18 101/68 (79) 98   


 


3/18/18 06:38   16     


 


3/18/18 05:34 98.3 63 16 96/55 (69) 99   


 


3/18/18 04:35   16     


 


3/18/18 03:20   16     


 


3/18/18 00:50 98.3 63 18 102/59 (73) 100   


 


3/17/18 21:30   16     


 


3/17/18 20:33 98.3 60 16 106/66 (79) 100   


 


3/17/18 16:46 98.4 72 18 108/76 (87) 98   


 


3/17/18 12:14 98.2 98 22 101/66 (78) 98   








Result Diagram:  


3/18/18 1018                                                                   

             3/16/18 0315





Imaging





Last Impressions








Abdomen X-Ray 3/14/18 0800 Signed





Impressions: 





 Service Date/Time:  Wednesday, March 14, 2018 09:22 - CONCLUSION:  1. Mild 





 constipation. No acute findings.     Tarun Root MD 


 


Bone Biopsy CT 3/13/18 0000 Signed





Impressions: 





 Service Date/Time:  Tuesday, March 13, 2018 14:08 - CONCLUSION:  1.  





 Uncomplicated CT guided bone marrow aspirate. 2.  Uncomplicated CT guided bone 





 marrow biopsy.     Denis Solorio MD 


 


Port Line Insertion 3/12/18 0000 Signed





Impressions: 





 Service Date/Time:  Monday, March 12, 2018 13:03 - CONCLUSION:  Uncomplicated 





 ultrasound and fluoroscopic guided implanted central venous port catheter 





 placement as described in detail above.  An 8 [place] port was placed.   

  





 Artem Nicholas MD 


 


Thoracic Spine MRI 3/8/18 0000 Signed





Impressions: 





 Service Date/Time:  Thursday, March 8, 2018 17:55 - CONCLUSION:  Signal 





 abnormalities within the T3 and T4 vertebral body and right T4 pedicle with 





 contrast enhancement.  There is also T1 prolongation without contrast 





 enhancement in the T8 vertebral body.  The appearance is nonspecific but is 





 abnormal and suggests possible metastatic disease.     Roby Matos MD 


 


Sacrum/Coccyx MRI 3/8/18 0000 Signed





Impressions: 





 Service Date/Time:  Thursday, March 8, 2018 17:55 - CONCLUSION:  There are 





 signal abnormalities in the superior S2 and bilateral sacroiliac with T2 





 prolongation and contrast enhancement.  This suggests the possibility of 





 metastatic lesions.     Roby Matos MD 


 


Lumbar Spine MRI 3/8/18 0000 Signed





Impressions: 





 Service Date/Time:  Thursday, March 8, 2018 17:55 - CONCLUSION:  1. No 

evidence 





 of disc bulge or protrusion. 2. Triangular shaped area of T2 prolongation in 

the 





 anterior inferior L1 vertebral body with correlating enhancement on the 





 postcontrast study.  This of uncertain significance, but would be an unusual 





 shape and location for a metastatic lesion.     Roby Matos MD 


 


Hip and Pelvis X-Ray 3/7/18 1854 Signed





Impressions: 





 Service Date/Time:  Wednesday, March 7, 2018 19:11 - CONCLUSION:  Negative 





 evaluation of the left hip.     Roby Matos MD 








Objective Remarks


General: NAD, AAOx3


Chest: CTA


Cardiac: Regular


Abd: soft, NT, ND, +BS x 4


Ext: No edema





A/P


Problem List:  


(1) Hodgkin lymphoma


ICD Codes:  C81.90 - Hodgkin lymphoma, unspecified, unspecified site


Status:  Acute


Plan:  - Pt had reported unintentional weight loss ~20lbs since 1/2018, night 

sweats and had outpt CT Chest/Abd/Pelvis on 3/2/18


- Outpt Chest CT W/W/O IV contrast (3/2/18):


   - Innumerable masses scattered throughout the lungs. Numerous lymph nodes 

throughout the mediastinum and both hilar regions





- Outpt CT Abd/pelvis W/W/O contrast (3/2/18):


   - There is widespread metastatic disease throughout the spleen, the liver 

and the retroperitoneum. Lymphoma most likely diagnosis


    versus conceivably melanoma or breast cancer. 


   - Indeterminate sclerotic lesion in T12. 


   - Liver with 2.7cm low-density lesion in the lateral segment of the left 

lobe of the liver. At least 5 other low-density lesions scattered


    throughout the liver concerning for metastatic disease. 


   - Spleen is markedly heterogenous appearance with multiple masses. 


   - Retroperitoneum with 2.2cm left periaortic lymph node below the left renal 

vein. At least 3 other retroperitoneal lymph nodes are present. 


   - 3.0 x 1.6cm right inguinal lymph node. Some internal obturator 

lymphadenopathy on the right as well. 





- Pt underwent outpt CT guided liver biopsy and US guided LN biopsy on 3/7/18.


- AFP, CA-19-9, CEA, Ca 125 are WNL


- TSH, Free T4 are WNL





- Pt has been having increased pain in her tailbone and back more recently. 


- Thoracic Spine MRI (3/8/18) --> Signal abnormalities within the T3 and T4 

vertebral body and right T4 pedicle with contrast enhancement.  There is 


 also T1 prolongation without contrast enhancement in the T8 vertebral body.  

The appearance is nonspecific but is abnormal and suggests possible


 metastatic disease. 


- Lumbar Spine MRI (3/8/18) --> No evidence of disc bulge or protrusion. 

Triangular shaped area of T2 prolongation in the anterior inferior L1 vertebral 


 body with correlating enhancement on the postcontrast study.  This of 

uncertain significance, but would be an unusual shape and location for a


 metastatic lesion.  


- Sacral spine MRI (3/8/18) --> There are signal abnormalities in the superior 

S2 and bilateral sacroiliac with T2 prolongation and contrast enhancement.


  This suggests the possibility of metastatic lesions.





- Pathology from lymph nodes shows classic Hodgkin lymphoma. liver bx 

nondiagnostic








- Decadron per oncology


- Increased Neurontin to 600mg TID started on 3/16


- 2D echo (3/10/18):


   - LV systolic function hyperdynamic with estimated EF 65-70%


   - Trace to mild tricuspid valve regurgitation


   - Estimated PA pressure 37.2mmHg


- Pt had PFT with DLCO on 3/12


- Caiuve-p-Gvbp placement on 3/12


- BM biopsy on 3/13, pathology pending. 


- Pt received chemotherapy (3/14/18)


- Gabapentin 600mg TID


-  Oramorph 90mg TID (increased to this dose 3/17)


- oxycodone increased 10-15 mg  prn & dilaudid q6h prn breakthrough pain


- case d/w nursing.  Try to to utilize PO medication only. 


- increased scheduled atrax to 50mg q6h


- Xanax prn anxiety





- will need adequate control of symptoms including pain, anxiety, and itching 

prior to discharge


- anticipate d/c to home with C and home PT on Monday.


- appreciate input from Gynecology, Dr. Arroyo. Pt started on Provera. 


- Case d/w Oncology Service (3/18/18)





(2) Symptomatic anemia


ICD Codes:  D64.9 - Anemia, unspecified


Status:  Acute


Plan:  


- Pt is a 39 y/o female who was sent to the ED by her PCP for worsening anemia 

and leukocytosis on outpt labs. 


- Pt reported various complaints at the time of admission, including weight 

loss (Approx 20lbs in the last 3 months), intermittent dizziness, 


 fevers, "bone pain", night sweats. 


- Pts outpt H/H has decreased to Hgb 7.4/Hct 22.8, MCV 77, MCH 25 on 3/6/18 and 

was recommended to report to the ED for further eval. 


- Pt had previously reported intermittent black stools and was noted to be 

Hemoccult positive in the ED. 


- She was seen by GI in 2/2017 and had an EGD with dilation for complaints of 

dysphagia and odynophagia. The EGD noted gastritis/superficial 


 ulceration of the antrum, esophagitis in the distal esophagus, and food bolus 

in the stomach. 


- Pt was planned for outpt evaluation with colonoscopy which had not been 

performed yet. 


- Pt has family hx of colon cancer, father. 


- She has received 2 units of PRBCs at admission with H/H improving and stable


- GI following


- Pt had EGD on 3/9/18 which was negative. 


- Pt refused the colonoscopy 


- Monitor H/H 


- PPI


- Supportive care





(3) Fever


ICD Codes:  R50.9 - Fever, unspecified


Status:  Resolved


Plan:  


- Pt has had reported night sweats and was noted to have fevers prior to 

admission


- Blood cultures taken in the ED with NGTD


- UA was negative


- Felt to most likely be related to tumor fever or related to malignancy, no 

obvious signs of infection


- Tylenol PRN





(4) Weight loss, unintentional


ICD Codes:  R63.4 - Abnormal weight loss


Status:  Acute


Plan:  


- See above.





(5) Back pain


ICD Codes:  M54.9 - Dorsalgia, unspecified


Status:  Acute


Plan:  


- Pt has been having increased pain in her tailbone and back more recently 

likely related to bone mets. 


- See above





(6) Liver mass


ICD Codes:  R16.0 - Hepatomegaly, not elsewhere classified


Plan:  


- Outpt biopsy was nondiagnostic





(7) Constipation


ICD Codes:  K59.00 - Constipation, unspecified


Plan:  Patient on Thelma-colace 2 tablets BID


increase lactulose to BID


add Fleets enema


KUB requested


Patient had one dose of relistor 3/15


plan to give additional Relistor after review of KUB





Assessment and Plan


Patient examined.


Assessment and plan formulated with Elva Means PA-C.


I agree with the above.





Pt quite painful this morning. Pt can NOT be discharged to home until 

controlled with PO pain medication regimen. 


Poor bowel sounds today. Obtain KUB. If no evidence of obstruction, will give 

Relistor. 


scheduled Oramorph


gabapentin increased


prn oxycodone also increased. 


continue BID pericolace


increase lactulose to BID





Problem Qualifiers





(1) Hodgkin lymphoma:  








Travon Cuadra DO Mar 18, 2018 11:25

## 2018-03-19 VITALS
RESPIRATION RATE: 18 BRPM | HEART RATE: 66 BPM | TEMPERATURE: 97.4 F | DIASTOLIC BLOOD PRESSURE: 70 MMHG | OXYGEN SATURATION: 100 % | SYSTOLIC BLOOD PRESSURE: 110 MMHG

## 2018-03-19 VITALS
SYSTOLIC BLOOD PRESSURE: 106 MMHG | OXYGEN SATURATION: 100 % | DIASTOLIC BLOOD PRESSURE: 64 MMHG | HEART RATE: 72 BPM | RESPIRATION RATE: 16 BRPM

## 2018-03-19 RX ADMIN — MORPHINE SULFATE SCH MG: 30 TABLET, EXTENDED RELEASE ORAL at 00:49

## 2018-03-19 RX ADMIN — HYDROXYZINE HYDROCHLORIDE SCH MG: 50 TABLET, FILM COATED ORAL at 03:00

## 2018-03-19 RX ADMIN — CLONIDINE HYDROCHLORIDE SCH MG: 0.1 INJECTION, SOLUTION EPIDURAL at 06:44

## 2018-03-19 RX ADMIN — HYDROMORPHONE HYDROCHLORIDE PRN MG: 2 INJECTION INTRAMUSCULAR; INTRAVENOUS; SUBCUTANEOUS at 00:49

## 2018-03-19 RX ADMIN — ALLOPURINOL SCH MG: 300 TABLET ORAL at 09:00

## 2018-03-19 RX ADMIN — MORPHINE SULFATE SCH MG: 30 TABLET, EXTENDED RELEASE ORAL at 09:31

## 2018-03-19 RX ADMIN — HYDROMORPHONE HYDROCHLORIDE PRN MG: 2 INJECTION INTRAMUSCULAR; INTRAVENOUS; SUBCUTANEOUS at 11:20

## 2018-03-19 RX ADMIN — Medication SCH ML: at 09:36

## 2018-03-19 RX ADMIN — STANDARDIZED SENNA CONCENTRATE AND DOCUSATE SODIUM SCH TAB: 8.6; 5 TABLET, FILM COATED ORAL at 09:31

## 2018-03-19 RX ADMIN — HYDROMORPHONE HYDROCHLORIDE PRN MG: 2 INJECTION INTRAMUSCULAR; INTRAVENOUS; SUBCUTANEOUS at 06:44

## 2018-03-19 RX ADMIN — GABAPENTIN SCH MG: 300 CAPSULE ORAL at 06:43

## 2018-03-19 RX ADMIN — HYDROXYZINE HYDROCHLORIDE SCH MG: 50 TABLET, FILM COATED ORAL at 09:00

## 2018-03-19 RX ADMIN — PANTOPRAZOLE SCH MG: 40 TABLET, DELAYED RELEASE ORAL at 09:30

## 2018-03-19 NOTE — PD.ONC.PN
Subjective


Subjective


Remarks


Afebrile overnight


Patient reports she is excited to be going home today


Had some pain overnight due to her pain medication coming in late





Objective


Data











  Date Time  Temp Pulse Resp B/P (MAP) Pulse Ox O2 Delivery O2 Flow Rate FiO2


 


3/19/18 07:54 97.4 66 18 110/70 (83) 100   


 


3/19/18 07:40   16     


 


3/19/18 07:40   16     


 


3/19/18 06:43   18     


 


3/19/18 01:27   16     


 


3/19/18 00:25   16     


 


3/19/18 00:00  72 16 106/64 (78) 100   


 


3/18/18 20:15  75 16 114/67 (83) 97   


 


3/18/18 15:30 97.2 64 18 109/69 (82) 97   


 


3/18/18 12:05 97.0 69 16 121/76 (91) 100   














 3/19/18 3/19/18 3/19/18





 07:00 15:00 23:00


 


Intake Total 1440 ml  


 


Balance 1440 ml  








Result Diagram:  


3/18/18 1018                                                                   

             3/18/18 1018








Administered Medications








 Medications


  (Trade)  Dose


 Ordered  Sig/Nohemi


 Route


 PRN Reason  Start Time


 Stop Time Status Last Admin


Dose Admin


 


 Sodium Chloride


  (NS Flush)  2 ml  BID


 IV FLUSH


   3/8/18 09:00


    3/19/18 09:36


 


 


 Acetaminophen


  (Tylenol)  650 mg  Q4H  PRN


 PO


 TEMP > 100.4  3/7/18 21:30


    3/10/18 23:25


 


 


 Magnesium


 Hydroxide


  (Milk Of


 Magnesia Liq)  30 ml  Q12H  PRN


 PO


 Mild constipation  3/7/18 21:30


    3/14/18 05:38


 


 


 Allopurinol


  (Zyloprim)  300 mg  DAILY


 PO


   3/11/18 09:00


    3/19/18 09:00


 


 


 Hydroxyzine HCl


  (Atarax)  25 mg  Q6H  PRN


 PO


 breakthrough itch  3/11/18 09:00


    3/17/18 00:38


 


 


 Hydromorphone HCl


  (Dilaudid Pf Inj)  1 mg  Q6H  PRN


 IV PUSH


 BREAKTHROUGH PAIN  3/15/18 10:00


    3/19/18 11:20


 


 


 Alprazolam


  (Xanax)  0.25 mg  Q6H  PRN


 PO


 anxiety  3/15/18 10:30


    3/18/18 19:34


 


 


 Oxycodone HCl


  (Roxicodone)  10 mg  Q4H  PRN


 PO


 pain 1-5  3/15/18 11:00


    3/18/18 22:04


 


 


 Pantoprazole


 Sodium


  (Protonix)  40 mg  DAILY


 PO


   3/16/18 09:00


    3/19/18 09:30


 


 


 Senna/Docusate


 Sodium


  (Thelma-Colace)  2 tab  BID


 PO


   3/15/18 21:00


    3/19/18 09:31


 


 


 Gabapentin


  (Neurontin)  600 mg  Q8HR


 PO


   3/16/18 14:00


    3/19/18 06:43


 


 


 Oxycodone HCl


  (Roxicodone)  15 mg  Q4H  PRN


 PO


 PAIN SCALE 6 TO 10  3/16/18 11:00


    3/19/18 09:31


 


 


 Morphine Sulfate


  (Oramorph Sr)  90 mg  Q8H


 PO


   3/17/18 10:00


    3/19/18 09:31


 


 


 Ketorolac


 Tromethamine


  (Toradol Inj)  30 mg  Q6HR


 IV PUSH


   3/17/18 18:00


 3/22/18 17:59  3/19/18 06:44


 


 


 Hydroxyzine HCl


  (Atarax)  50 mg  Q6H


 PO


   3/17/18 21:00


    3/19/18 09:00


 


 


 Medroxyprogesterone


 Acetate


  (Provera)  2.5 mg  DAILY


 PO


   3/18/18 10:00


    3/18/18 18:21


 


 


 Dexamethasone


  (Decadron)  1 mg  DAILY


 PO


   3/19/18 09:00


    3/19/18 09:31


 








Objective Remarks


GENERAL: Thin younger female sitting up in bed in no obvious distress


SKIN: Warm and dry.


HEAD: Normocephalic.


EYES: No scleral icterus. No injection or drainage. 


NECK: Supple, trachea midline. No JVD or lymphadenopathy.


CARDIOVASCULAR: Regular rate and rhythm without murmurs. 


RESPIRATORY: Breath sounds equal bilaterally. No accessory muscle use.


GASTROINTESTINAL: Abdomen soft, non-tender, nondistended. 


EXTREMITIES: No cyanosis, or edema. 


MUSCULOSKELETAL: Adequate muscle tone.


NEUROLOGICAL: No obvious focal deficit. Awake, alert, and oriented x3.





Assessment/Plan


Problem List:  


(1) Hodgkin lymphoma


ICD Codes:  C81.90 - Hodgkin lymphoma, unspecified, unspecified site


Status:  Acute


Plan:  3/19/18: Clear for discharge from oncology standpoint.  Called new 

patient referrals and they state they will call patient this week when an 

appointment can be arranged with Dr. Hemphill.


3/16/18: continue Oramorph + oxycodone for breakthrough. clear for discharge 

when pain controlled. 





3/15/18: s/p ABVD yesterday.  discussed changing pain medications to PO and 

addressing constipation.  discussed that she can go home when her pain is 

controlled. 





3/14/18 BM biopsy results still pending.


Proceed with C1D1 ABVD.


Discussed risks and benefits, no more questions at present.





3/13/18: GYN oncology consult, bone marrow biopsy today. 





--HIV negative





--Pulmonary function test:


VOLUMES


DYNAMIC:   FVC and FEV1 normal.


STATIC:   RV and FRC mildly increased; TLC normal.


FLOWS:   FEV1% normal; FEF 25-75 severely reduced.


DIFFUSION:   Low normal.





IMPRESSION:    


--Very mild obstructive ventilatory defect in the terminal airways with mild 

hyperinflation. There is improvement post-bronchodilator. 


--Echocardiogram shows EF of 65-70%





HPI: 4 months ago in December she had developed pneumonia-like symptoms with 

cough.  +running fever.  goes up to 103. ++drenching night sweats. lost 20 

pounds in the last 4 months. complaining of back pain and severe bone pain.  

CAT of the chest, abdomen and pelvis was done which showed lymphadenopathy, 

multiple lesions in the lung, spleen and liver. biopsy of the liver mass and 

also right inguinal lymph node=classical Hodgkin's lymphoma. 


--Dom consulted ([facility]) for possible palliative xrt to painful back lesions

: chemotherapy regimen and radiation treatment cannot be delivered at the same 

time.  If her pain is not improving, consider delivering radiation treatment 

perhaps just prior to her next chemotherapy--could try to limit her radiation 

treatment to 5 fractions.  do not believe she is a candidate for single 

fraction treatment.





(2) Insomnia


ICD Codes:  G47.00 - Insomnia, unspecified


Plan:  --on xanax PO PRN





Assessment


39 y/o female with newly diagnosed classical Hodgkin's lymphoma, high IPS. s/p 

C1 ABVD 3/14


Plan


1.  Clear for discharge from oncology standpoint


2.  Continue p.o. pain medications and allopurinol


3.  Patient will follow-up with Dr. Hemphill for future chemo planning


Attending Statement


Agree with above, pt DC before could be seen, plan fu as out pt to cont with 

next chemo in next week.





Problem Qualifiers





(1) Hodgkin lymphoma:  








Judy Navarro Mar 19, 2018 11:44


Hyun Hemphill MD Mar 19, 2018 17:47

## 2018-03-19 NOTE — HHI.DCPOC
Discharge Care Plan


Diagnosis:  


(1) Hodgkin lymphoma


Goals to Promote Your Health


* To prevent worsening of your condition and complications


* To maintain your health at the optimal level


Directions to Meet Your Goals


*** Take your medications as prescribed


*** Follow your dietary instruction


*** Follow activity as directed








*** Keep your appointments as scheduled


*** Take your immunizations and boosters as scheduled


*** If your symptoms worsen call your PCP, if no PCP go to Urgent Care Center 

or Emergency Room***


*** Smoking is Dangerous to Your Health. Avoid second hand smoke***


***Call the 24-hour hour crisis hotline for domestic abuse at 1-344.851.1762***











Pedro Le MD Mar 19, 2018 07:54

## 2018-03-19 NOTE — HHI.DS
Discharge Summary


Admission Date


Mar 8, 2018 at 14:49


Discharge Date:  Mar 19, 2018


Admitting Diagnosis





symptomatic anemia





(1) Hodgkin lymphoma


Diagnosis:  Principal


ICD Codes:  C81.90 - Hodgkin lymphoma, unspecified, unspecified site


Status:  Acute


(2) Symptomatic anemia


Diagnosis:  Principal


ICD Codes:  D64.9 - Anemia, unspecified


Status:  Acute


(3) Fever


Diagnosis:  Principal


ICD Codes:  R50.9 - Fever, unspecified


Status:  Resolved


(4) Weight loss, unintentional


Diagnosis:  Secondary


ICD Codes:  R63.4 - Abnormal weight loss


Status:  Acute


(5) Back pain


Diagnosis:  Secondary


ICD Codes:  M54.9 - Dorsalgia, unspecified


Status:  Acute


(6) Liver mass


Diagnosis:  Secondary


ICD Codes:  R16.0 - Hepatomegaly, not elsewhere classified


(7) Constipation


ICD Codes:  K59.00 - Constipation, unspecified


Brief History





39 YO F presents to the ED for evaluation of multiple medical complaints.  She 

endorses intermittent dizziness. She states that she woke up around 530am and 

had an oral temp of 103.  She states that she also experienced stinging pain in 

the tailbone. No known injury. She complains of stinging pruritus of the back.  

She complains of left hip pain without trauma.  She complains of intermittent 

right shin pain which she states is "in the bone."  She states that she has 

lost 20 pounds since January.  She endorses night sweats for an unknown period 

of time. She endorses 3 week history of nonproductive cough. She denies CP, SOB

, palpitations. She endorses chronic constipation,one bowel movement a week "

for my lifetime."  States her stools have been dark lately.  She denies anorexia

, N/V,abdominal pain, diarrhea. She denies risk of pregnancy, states that LMP 

was " about a month ago."  She is a current smoker. She endorse occasionally 

snorting cocaine. She endorses occasional alcohol use.  She denies history of 

IVDA.  She states that she had CT guided biopsies of a liver mass and a right 

inguinal lymph node this AM, was afebrile at that time. She was discharged from 

the procedure around 3pm today. She complains of right inguinal pain at the 

needle insertion site of the right inguinal region. She states that her PCP 

called her and told her to come to the ED for "low hemoglobin and problems with 

the biopsies."   She is followed by Dr. Carbajal.  As outpatient found to have 

liver abnormality and rt inguinal lyph node abnormality and were biopsied today 

,patient had 1 week ago hgb 8 now 7.5 and syptomatic admit for blood 

transfusion and further work up did have trace positive stools.





[facility]


CBC/BMP:  


3/18/18 1018                                                                   

             3/18/18 1018





Significant Findings





Laboratory Tests








Test


  3/18/18


10:18


 


Red Blood Count


  3.93 MIL/MM3


(4.00-5.30)


 


Hemoglobin


  10.1 GM/DL


(11.6-15.3)


 


Hematocrit


  31.2 %


(35.0-46.0)


 


Mean Corpuscular Volume


  79.3 FL


(80.0-100.0)


 


Mean Corpuscular Hemoglobin


  25.8 PG


(27.0-34.0)


 


Red Cell Distribution Width


  19.4 %


(11.6-17.2)


 


Platelet Count


  593 TH/MM3


(150-450)


 


Neutrophils (%) (Auto)


  90.9 %


(16.0-70.0)


 


Lymphocytes (%) (Auto)


  7.0 %


(9.0-44.0)


 


Neutrophils # (Auto)


  8.2 TH/MM3


(1.8-7.7)


 


Lymphocytes # (Auto)


  0.6 TH/MM3


(1.0-4.8)


 


Carbon Dioxide Level


  32.2 MEQ/L


(21.0-32.0)








Hospital Course





(1) Hodgkin lymphoma





- Pt had reported unintentional weight loss ~20lbs since 1/2018, night sweats 

and had outpt CT Chest/Abd/Pelvis on 3/2/18


- Outpt Chest CT W/W/O IV contrast (3/2/18):


   - Innumerable masses scattered throughout the lungs. Numerous lymph nodes 

throughout the mediastinum and both hilar regions





- Outpt CT Abd/pelvis W/W/O contrast (3/2/18):


   - There is widespread metastatic disease throughout the spleen, the liver 

and the retroperitoneum. Lymphoma most likely diagnosis


    versus conceivably melanoma or breast cancer. 


   - Indeterminate sclerotic lesion in T12. 


   - Liver with 2.7cm low-density lesion in the lateral segment of the left 

lobe of the liver. At least 5 other low-density lesions scattered


    throughout the liver concerning for metastatic disease. 


   - Spleen is markedly heterogenous appearance with multiple masses. 


   - Retroperitoneum with 2.2cm left periaortic lymph node below the left renal 

vein. At least 3 other retroperitoneal lymph nodes are present. 


   - 3.0 x 1.6cm right inguinal lymph node. Some internal obturator 

lymphadenopathy on the right as well. 





- Pt underwent outpt CT guided liver biopsy and US guided LN biopsy on 3/7/18.


- AFP, CA-19-9, CEA, Ca 125 are WNL


- TSH, Free T4 are WNL





- Pt has been having increased pain in her tailbone and back more recently. 


- Thoracic Spine MRI (3/8/18) --> Signal abnormalities within the T3 and T4 

vertebral body and right T4 pedicle with contrast enhancement.  There is 


 also T1 prolongation without contrast enhancement in the T8 vertebral body.  

The appearance is nonspecific but is abnormal and suggests possible


 metastatic disease. 


- Lumbar Spine MRI (3/8/18) --> No evidence of disc bulge or protrusion. 

Triangular shaped area of T2 prolongation in the anterior inferior L1 vertebral 


 body with correlating enhancement on the postcontrast study.  This of 

uncertain significance, but would be an unusual shape and location for a


 metastatic lesion.  


- Sacral spine MRI (3/8/18) --> There are signal abnormalities in the superior 

S2 and bilateral sacroiliac with T2 prolongation and contrast enhancement.


  This suggests the possibility of metastatic lesions.





- Pathology from lymph nodes shows classic Hodgkin lymphoma. liver bx 

nondiagnostic


- 2D echo (3/10/18):


   - LV systolic function hyperdynamic with estimated EF 65-70%


   - Trace to mild tricuspid valve regurgitation


   - Estimated PA pressure 37.2mmHg


- Pt had PFT with DLCO on 3/12


- Nxsfte-r-Ejli placement on 3/12


- BM biopsy on 3/13, pathology pending








- Decadron per oncology. continue current 1mg dose and allow oncology to taper 

as needed


- Increased Neurontin to 600mg TID started on 3/16


- Pt received chemotherapy (3/14/18)


-  Oramorph 90mg TID (increased to this dose 3/17)


- oxycodone increased 10-15 mg prn


- increased scheduled atrax to 50mg q6h


- Xanax prn anxiety


- appreciate input from Gynecology, Dr. Arroyo. 


- scripts written. 2 week supply of pain meds then reassess in clinic


  dc today after seen by oncology





(2) Symptomatic anemia


ICD Codes:  D64.9 - Anemia, unspecified


Status:  Acute


Plan:  


- Pt is a 37 y/o female who was sent to the ED by her PCP for worsening anemia 

and leukocytosis on outpt labs. 


- Pt reported various complaints at the time of admission, including weight 

loss (Approx 20lbs in the last 3 months), intermittent dizziness, 


 fevers, "bone pain", night sweats. 


- Pts outpt H/H has decreased to Hgb 7.4/Hct 22.8, MCV 77, MCH 25 on 3/6/18 and 

was recommended to report to the ED for further eval. 


- Pt had previously reported intermittent black stools and was noted to be 

Hemoccult positive in the ED. 


- She was seen by GI in 2/2017 and had an EGD with dilation for complaints of 

dysphagia and odynophagia. The EGD noted gastritis/superficial 


 ulceration of the antrum, esophagitis in the distal esophagus, and food bolus 

in the stomach. 


- Pt was planned for outpt evaluation with colonoscopy which had not been 

performed yet. 


- Pt has family hx of colon cancer, father. 


- She has received 2 units of PRBCs at admission with H/H improving and stable


- GI following


- Pt had EGD on 3/9/18 which was negative. 


- Pt refused the colonoscopy 


- Monitor H/H 


- PPI


- Supportive care





(3) Fever


ICD Codes:  R50.9 - Fever, unspecified


Status:  Resolved


Plan:  


- Pt has had reported night sweats and was noted to have fevers prior to 

admission


- Blood cultures taken in the ED with NGTD


- UA was negative


- Felt to most likely be related to tumor fever or related to malignancy, no 

obvious signs of infection


- Tylenol PRN





(4) Weight loss, unintentional


ICD Codes:  R63.4 - Abnormal weight loss


Status:  Acute


Plan:  


- See above.





(5) Back pain


ICD Codes:  M54.9 - Dorsalgia, unspecified


Status:  Acute


Plan:  


- Pt has been having increased pain in her tailbone and back more recently 

likely related to bone mets. 


- See above





(6) Liver mass


ICD Codes:  R16.0 - Hepatomegaly, not elsewhere classified


Plan:  


- Outpt biopsy was nondiagnostic





(7) Constipation


ICD Codes:  K59.00 - Constipation, unspecified


Pt Condition on Discharge:  Stable


Discharge Disposition:  Discharge Home


Discharge Instructions


DIET: Follow Instructions for:  As Tolerated, No Restrictions


Activities you can perform:  Regular-No Restrictions


Follow up Referrals:  


OB/GYN @ [facility] with Ryan Arroyo MD


Oncology - 1 Week with dr Hyun Hemphill


PCP Follow-up - 1 Week with dr Carbajal





New Medications:  


Norethindrone (Ortho Micronor-35) 0.35 Mg Tab


1 TAB PO DAILY for Birth Control for 30 Days, #1 PACK 11 Refills





Alprazolam (Xanax) 0.25 Mg Tab


0.25 MG PO Q8HR PRN for anxiety, #30 TAB





Dexamethasone (Dexamethasone) 0.5 Mg Tab


1 MG PO DAILY for Inflammation for 30 Days, #60 TAB





Gabapentin (Neurontin) 300 Mg Cap


600 MG PO Q8HR for Pain Management, #180 CAP





Hydroxyzine HCl (Hydroxyzine HCl) 50 Mg Tab


50 MG PO Q6H for Itching for 30 Days, #120 TAB





Morphine ER (Morphine ER) 30 Mg Tab


90 MG PO Q8H for Pain Management, #126 TAB





Oxycodone (Oxycodone) 10 Mg Tab


10 MG PO Q4H PRN for pain, #150 TAB





 


Continued Medications:  


Calcium Gluconate (Calcium Gluconate) 45 Mg Calcium (500 Mg) Tab


500 MG PO DAILY for Calcium Supplement, TAB 0 Refills


1 gram of salt is 93 mg elemental calcium.


Cyanocobalamin (Vitamin B-12) 500 Mcg Tab


500 MCG PO DAILY for Nutritional Supplement, #1 BOTTLE 0 Refills





Ferrous Sulfate (Ferrous Sulfate) 325 Mg (65 Mg Iron) Tablet


325 MG PO DAILY for Nutritional Supplement, #30 TAB 0 Refills





Fish Oil-Cholecalciferol (Fish Oil + D3) 1,200-1,000 Mg-Unit Cap


1 CAP PO DAILY for Nutritional Supplement, #30 CAP 0 Refills





Levocetirizine Dihydrochloride (Xyzal) 2.5 Mg/5 Ml Solution


2.5 MG PO DAILY





Magnesium Oxide (Magnesium Oxide) 250 Mg Tab


250 MG PO DAILY, TAB 0 Refills





Ranitidine (Ranitidine) 150 Mg Tab


150 MG PO DAILY for Heartburn Management, #30 TAB 0 Refills





Saccharomyces Boulardii (Probiotic) 250 Mg Cap


250 MG PO BID for Nutritional Supplement, CAP 0 Refills





 


Discontinued Medications:  


Hydroxyzine HCl (Hydroxyzine HCl) 50 Mg Tab


50 MG PO HS, TAB 0 Refills





Meloxicam (Meloxicam) 7.5 Mg Tab


7.5 MG PO DAILY for Arthritis Pain, TAB 0 Refills

















Pedro Le MD Mar 19, 2018 09:38

## 2018-03-23 NOTE — RSPPFT
DATE OF PROCEDURE:   3/10/18



COMMENTS:   



Spirometry demonstrates an FEV1 of 3.4 at 78% of predicted, FVC of 4.1 at 100%, FEV1/FVC 
ratio is 19%. The FEF 25-75 is 23%. Post-bronchodilator study was not conducted. Flow 
volume loops suggest severe obstruction.  



IMPRESSION:    

   

1.    Severe obstructive airways disease.

## 2021-09-24 NOTE — HHI.HP
__________________________________________________





HPI


[facility]


[facility]


Primary Care Physician


No Primary Care Physician


Admission Diagnosis


Inability to tolerate po, suspected esophageal food bolus


Diagnoses:  


(1) Food impaction of esophagus


Diagnosis:  Principal





(2) Intractable vomiting


Diagnosis:  Principal





(3) Leukocytosis


Diagnosis:  Principal





Travel History


International Travel<30 Days:  No


Contact w/Intl Traveler <30 Da:  No


Traveled to Known Affected Are:  No


History of Present Illness


This is a 37-year-old female with a PMH of Migraines who presented to the ER 

with complaints of sensation of foreign body in esophagus after eating.  States 

she was at [employer] and tried a sample of steak/asparagus and immediately felt 

food stuck in throat.  Unable to swallow liquids or solids since then, 

difficulty swallowing saliva as well.  On arrival, /80, HR 95, O2 sat 90% 

on RA, Afebrile.  WBC 14.5.  Chemistry essentially unremarkable.  CXR with no 

foreign body identified.  Dr. Long consulted by ER physician, plan for EGD 

in am.





Review of Systems


Except as stated in HPI:  all other systems reviewed are Neg


ROS: 14 point review of systems otherwise negative.





Past Family Social History


Past Medical History


PMH:  Migraines


Past Surgical History


PAST SURGICAL HISTORY:  Ovarian Cyst


Allergies:  


Coded Allergies:  


     No Known Allergies (Verified , 2/12/17)


Family History


PAST FAMILY HISTORY:  Reviewed.  No h/o DM or CAD


Social History


PAST SOCIAL HISTORY:  Occasional alcohol.  Negative for tobacco or drugs.





Physical Exam


Vital Signs





 Vital Signs








  Date Time  Temp Pulse Resp B/P Pulse Ox O2 Delivery O2 Flow Rate FiO2


 


2/12/17 22:42 98.8 95 20 121/80 98 Room Air  








Physical Exam


PE:


GENERAL: Pleasant middle-aged white female in no acute distress.


HEENT: PERRLA, EOMI. No scleral icterus or conjunctival pallor. No lid lag or 

facial droop.  


CARDIOVASCULAR: Regular rate and rhythm.  No obvious murmurs to auscultation. 

No chest tenderness to palpation. 


RESPIRATORY: No obvious rhonchi or wheezing. Clear to auscultation. Breath 

sounds equal bilaterally. 


GASTROINTESTINAL: Abdomen soft, non-tender, nondistended. BS normal. 


MUSCULOSKELETAL: Extremities without clubbing, cyanosis, or edema. No obvious 

deformities. 


NEUROLOGICAL: Awake, alert and oriented x4. No focal neurologic deficits. 

Moving both upper and lower extremities spontaneously.


Laboratory





Laboratory Tests








Test 2/12/17





 23:40


 


White Blood Count 14.5 


 


Red Blood Count 3.79 


 


Hemoglobin 11.2 


 


Hematocrit 32.5 


 


Mean Corpuscular Volume 85.8 


 


Mean Corpuscular Hemoglobin 29.4 


 


Mean Corpuscular Hemoglobin 34.3 





Concent 


 


Red Cell Distribution Width 13.3 


 


Platelet Count 555 


 


Mean Platelet Volume 7.7 


 


Neutrophils (%) (Auto) 75.9 


 


Lymphocytes (%) (Auto) 13.0 


 


Monocytes (%) (Auto) 6.9 


 


Eosinophils (%) (Auto) 3.7 


 


Basophils (%) (Auto) 0.5 


 


Neutrophils # (Auto) 11.0 


 


Lymphocytes # (Auto) 1.9 


 


Monocytes # (Auto) 1.0 


 


Eosinophils # (Auto) 0.5 


 


Basophils # (Auto) 0.1 


 


CBC Comment DIFF FINAL 


 


Differential Comment  


 


Sodium Level 141 


 


Potassium Level 3.7 


 


Chloride Level 106 


 


Carbon Dioxide Level 24.5 


 


Anion Gap 11 


 


Blood Urea Nitrogen 14 


 


Creatinine 0.72 


 


Estimat Glomerular Filtration 91 





Rate 


 


Random Glucose 82 


 


Calcium Level 8.7 


 


Total Bilirubin 0.2 


 


Aspartate Amino Transf 10 





(AST/SGOT) 


 


Alanine Aminotransferase 15 





(ALT/SGPT) 


 


Alkaline Phosphatase 92 


 


Total Protein 8.1 


 


Albumin 3.3 


 


Lipase 81 








Result Diagram:  


2/12/17 2340 2/12/17 2340








Assessment and Plan


Problem List:  


(1) Food impaction of esophagus


ICD Code:  T18.128A


Status:  Acute


(2) Intractable vomiting


ICD Code:  R11.10


Status:  Acute


(3) Leukocytosis


ICD Code:  D72.829


Status:  Acute


Assessment and Plan


A/P:


1.  Food Impaction:  s/p acute food impaction after eating steak/asparagus 

earlier tonight, unable to swallow saliva, liquids or solids.  CXR w/ no 

foreign body noted, images reviewed by me.  Dr. Long consulted by ER 

physician, plan is for EGD in am.  NPO, IVF, analgesics as needed. 


2.  Intractable Vomiting:  now resolved.  Continue w/ IV Zofran as needed. 


3.  Leukocytosis:  WBC 14.5, no clear infectious etiology, likely stress 

response.  CXR negative as above.  


4.  DVT Prophylaxis:  SCD/Teds. 


5.  Social work for d/c planning as needed. 


6.  Case discussed w/ ER physician at length.








Dolores Travis MD Feb 13, 2017 02:18 no

## 2023-10-10 NOTE — PD.ONC.PN
Pt came into the office today for  flu shot .  Pt tolerated well.  Vaccine given in left  deltoid.    Subjective


Subjective


Remarks


Afebrile overnight. 


Patient still having itching, feeling anxious. 


She feels good about the plan today.  


Waiting to go down for bone marrow biopsy. 


reports pain in her back and around her collar bone (at site of port placement)

. 


has been using IV breakthrough dilaudid every three hours, and states sometimes 

she needs it before it is available.





Objective


Data











  Date Time  Temp Pulse Resp B/P (MAP) Pulse Ox O2 Delivery O2 Flow Rate FiO2


 


3/13/18 05:20 98.2 68 16 112/63 (79) 98   


 


3/13/18 04:04  100      


 


3/13/18 00:05  70      


 


3/12/18 23:55 97.5 75 16 114/76 (89) 99   


 


3/12/18 21:00 97.8 92 18 110/73 (85) 100   


 


3/12/18 20:27  82      


 


3/12/18 18:00  90      


 


3/12/18 17:10 97.5 92 20 118/74 (89) 100   


 


3/12/18 16:48 97.5 92 18 118/74 (89) 100   


 


3/12/18 16:15  96 18 96/50 (65) 96   


 


3/12/18 15:43  84 18 104/53 (70) 96   


 


3/12/18 15:15  97 20 97/45 (62) 95   


 


3/12/18 15:00  86 20 103/46 (65) 96   


 


3/12/18 13:00  82      


 


3/12/18 12:00  66      


 


3/12/18 12:00 98.4 79 18 94/49 (64) 100   


 


3/12/18 11:00  62      


 


3/12/18 10:00  70      














 3/13/18 3/13/18 3/13/18





 06:59 14:59 22:59


 


Intake Total 240 ml  


 


Output Total 300 ml  


 


Balance -60 ml  








Result Diagram:  


3/13/18 0600                                                                   

             3/13/18 0600





Objective Remarks


GENERAL: Young woman, upright in bed, anxious, agitated, polite and well 

groomed. 


SKIN: Warm and dry. port in place, right chest wall. 


HEAD: Normocephalic.


EYES: No injection or drainage. 


NECK: Supple, trachea midline. 


CARDIOVASCULAR: Regular rate and rhythm


RESPIRATORY: Breath sounds equal bilaterally. No accessory muscle use.


GASTROINTESTINAL: Abdomen soft, non-tender, nondistended. 


EXTREMITIES: No cyanosis


NEUROLOGICAL: no obvious focal deficit.





Assessment/Plan


Problem List:  


(1) Hodgkin lymphoma


ICD Codes:  C81.90 - Hodgkin lymphoma, unspecified, unspecified site


Status:  Acute


Plan:  3/13/18: GYN oncology consult, bone marrow biopsy today. 





--HIV negative





--Pulmonary function test:


VOLUMES


DYNAMIC:   FVC and FEV1 normal.


STATIC:   RV and FRC mildly increased; TLC normal.


FLOWS:   FEV1% normal; FEF 25-75 severely reduced.


DIFFUSION:   Low normal.





IMPRESSION:    


--Very mild obstructive ventilatory defect in the terminal airways with mild 

hyperinflation. There is improvement post-bronchodilator. 


--Echocardiogram shows EF of 65-70%





HPI: 4 months ago in December she had developed pneumonia-like symptoms with 

cough.  +running fever.  goes up to 103. ++drenching night sweats. lost 20 

pounds in the last 4 months. complaining of back pain and severe bone pain.  

CAT of the chest, abdomen and pelvis was done which showed lymphadenopathy, 

multiple lesions in the lung, spleen and liver. biopsy of the liver mass and 

also right inguinal lymph node=classical Hodgkin's lymphoma. 


--Dom consulted ([facility]) for possible palliative xrt to painful back lesions

: chemotherapy regimen and radiation treatment cannot be delivered at the same 

time.  If her pain is not improving, consider delivering radiation treatment 

perhaps just prior to her next chemotherapy--could try to limit her radiation 

treatment to 5 fractions.  do not believe she is a candidate for single 

fraction treatment.





(2) Back pain


ICD Codes:  M54.9 - Dorsalgia, unspecified


Status:  Acute


Plan:  3/13: increase Oramorph to 60mg PO TID, continue Dilaudid IV for 

breakthrough pain.


--on scheduled gabapentin for generalized tingling and burning





(3) Insomnia


ICD Codes:  G47.00 - Insomnia, unspecified


Plan:  --ativan 0.5mg IV PRN





Assessment


37 y/o female admitted with severe back pain with a new diagnosis of classical 

Hodgkin's lymphoma


Plan


1. patient requiring large amounts of dilaudid for breakthrough pain. will 

increase long-acting Oramorph to 60mg PO TID and continue dilaudid for 

breakthrough


2. add ativan 0.5mg IV PRN anxiety or insomnia. 


3. await GYN consult, appreciate assistance. 


4. bone marrow biopsy today.


Attending Statement


The exam, history, and the medical decision-making described in the above note 

were completed with the assistance of the mid-level provider. I reviewed and 

agree with the findings presented.  I attest that I had a face-to-face 

encounter with the patient on the same day, and personally performed and 

documented my assessment and findings in the medical record.





Pt seen and examined.


Recommend against the cold caps, she is at high risk/significant risk HD, I do 

not wish to compromise any aspect of curative intent chemotherapy.


GYN consult still pending, she continues on her OC's, discussed the risk of VTE

, plan on DVT prophylaxis.


Await GYN recommendations.





S/P bone marrow biopsy, discussed risk and benefits of ABVD.


OK to start treatment.


Discussed w/ Dr. Mujica, her PFT are acceptable to proceed with chemotherapy.


Monitor for toxicity.





DC planning.


Advised that she could go home w/ fu as out pt after her chemo.


She will need to follow up with Oncology as out pt.











Ira Alston Mar 13, 2018 09:15


Hyun Hemphill MD Mar 13, 2018 19:02